# Patient Record
Sex: MALE | Race: WHITE | Employment: OTHER | URBAN - NONMETROPOLITAN AREA
[De-identification: names, ages, dates, MRNs, and addresses within clinical notes are randomized per-mention and may not be internally consistent; named-entity substitution may affect disease eponyms.]

---

## 2017-08-18 LAB
CREATININE, URINE: 81
MICROALBUMIN/CREAT 24H UR: 0.6 MG/G{CREAT}
MICROALBUMIN/CREAT UR-RTO: 7.4

## 2018-09-24 LAB
AVERAGE GLUCOSE: NORMAL
CHOLESTEROL, TOTAL: 178 MG/DL
CHOLESTEROL/HDL RATIO: 4.7
CREATININE: 1.4 MG/DL
HBA1C MFR BLD: 6.9 %
HDLC SERPL-MCNC: 38 MG/DL (ref 35–70)
LDL CHOLESTEROL CALCULATED: 111 MG/DL (ref 0–160)
POTASSIUM (K+): 4.8
TRIGL SERPL-MCNC: 169 MG/DL
VLDLC SERPL CALC-MCNC: NORMAL MG/DL

## 2019-03-26 VITALS
BODY MASS INDEX: 24.48 KG/M2 | DIASTOLIC BLOOD PRESSURE: 82 MMHG | WEIGHT: 171 LBS | TEMPERATURE: 96 F | HEIGHT: 70 IN | HEART RATE: 72 BPM | SYSTOLIC BLOOD PRESSURE: 140 MMHG

## 2019-03-26 RX ORDER — HYDROCODONE BITARTRATE AND ACETAMINOPHEN 7.5; 325 MG/1; MG/1
1 TABLET ORAL EVERY 6 HOURS PRN
COMMUNITY
End: 2019-06-21

## 2019-03-26 RX ORDER — ATENOLOL 25 MG/1
25 TABLET ORAL DAILY
COMMUNITY
End: 2019-06-21 | Stop reason: SDUPTHER

## 2019-03-26 RX ORDER — AMLODIPINE BESYLATE 5 MG/1
5 TABLET ORAL DAILY
COMMUNITY
End: 2019-06-21

## 2019-03-26 RX ORDER — GLYBURIDE 5 MG/1
5 TABLET ORAL
COMMUNITY
End: 2019-06-21

## 2019-03-26 RX ORDER — SIMVASTATIN 20 MG
20 TABLET ORAL NIGHTLY
COMMUNITY
End: 2019-06-21 | Stop reason: SDUPTHER

## 2019-03-26 RX ORDER — GEMFIBROZIL 600 MG/1
600 TABLET, FILM COATED ORAL
COMMUNITY
End: 2019-06-21 | Stop reason: SDUPTHER

## 2019-03-26 RX ORDER — MAGNESIUM GLUCONATE 27 MG(500)
500 TABLET ORAL DAILY
COMMUNITY

## 2019-06-20 PROBLEM — E78.5 HYPERLIPIDEMIA: Status: ACTIVE | Noted: 2019-06-20

## 2019-06-20 PROBLEM — E11.9 TYPE 2 DIABETES MELLITUS WITHOUT COMPLICATION (HCC): Status: ACTIVE | Noted: 2019-06-20

## 2019-06-20 PROBLEM — I10 HYPERTENSION: Status: ACTIVE | Noted: 2019-06-20

## 2019-06-20 PROBLEM — M19.90 OSTEOARTHRITIS: Status: ACTIVE | Noted: 2019-06-20

## 2019-06-20 NOTE — PROGRESS NOTES
19  Gunjan Plan : 1940 Sex: male  Age: 66 y.o. Chief Complaint   Patient presents with    Hypertension    Diabetes       Patient continues to have right knee discomfort. This is not to the point where it becomes somewhat limiting. His wife is trying to convince him to have the surgery. He is really conflicted about doing this. He did see Dr. Braden Lu regarding this. Patient denies excessive thirst, urination, hunger. He does have some BPH symptomatology and he does see urology for this. He is on alpha-blocker. Blood pressure is well controlled. Hemoglobin A1c will be obtained today. Patient did have lipid profile last fall and this will be rechecked again in the fall. PSAs have been followed by urology. Review of Systems  Health Maintenance:  Influenza Vaccination - (2018)  Colonoscopy Screening - (2014)  Colonoscopy - (2009)  Colonoscopy - (2014)  Colonoscopy -  [place],  [place] diverticular dx next -SEE REPORT  Prostate Exam - () Ned Huynhogrshama  Psa Test - ()  Physical Exam - (2017)  Rectal Exam - (2014)  Zoster Vaccine - ()  Pneumonia Vaccination - ()  Eye Exam - () VANCE- ADVISED REPEAT VISIT 17  Prevnar - (3/18/2015)  Urine Microalbumin - (2017)  Dilated Eye Exam - (2017) ADVISED  Diabetic Foot Exam - (2017)  1. Noninsulin requiring diabetes. 2. Hyperlipidemia. 3. Hypertension. 4. Osteoarthritis. STALLWORTH  5. CATARACT- VANCE DID SURGERY   6. KIDNEY INSUFFICIENCY   7. KIDNEY STONES--  8. DELONTE- ?? [facility]   9. CAROTID BRUIT-US ORDERED 19  10. LEFT GREATER TROCHANTERIC BUSITIS 19  11. PVD BY CT   Surgical Hx:  Cataract Removal -  VANCE  Transurethral Resection Of Prostate (TURP) - JACKI  Reviewed and updated. FH:  Father:  . (Hx)  Mother:  . (Hx)  Reviewed, no changes. SH: Patient is . Personal Habits: Former cigarette smoker, smoked 1 pack daily. strain: Not on file    Food insecurity:     Worry: Not on file     Inability: Not on file    Transportation needs:     Medical: Not on file     Non-medical: Not on file   Tobacco Use    Smoking status: Former Smoker    Smokeless tobacco: Never Used   Substance and Sexual Activity    Alcohol use: Not on file    Drug use: Not on file    Sexual activity: Not on file   Lifestyle    Physical activity:     Days per week: Not on file     Minutes per session: Not on file    Stress: Not on file   Relationships    Social connections:     Talks on phone: Not on file     Gets together: Not on file     Attends Taoism service: Not on file     Active member of club or organization: Not on file     Attends meetings of clubs or organizations: Not on file     Relationship status: Not on file    Intimate partner violence:     Fear of current or ex partner: Not on file     Emotionally abused: Not on file     Physically abused: Not on file     Forced sexual activity: Not on file   Other Topics Concern    Not on file   Social History Narrative    Not on file       Vitals:    19 0752   BP: 132/80   Pulse: 79   SpO2: 95%   Weight: 176 lb (79.8 kg)   Height: 5' 9\" (1.753 m)       Physical Exam  Objective  BP: 140/82 Pulse: 72 O2SatR: 96 Ht: 70\" 5'10\" Ht cm: 177.8 Wt: 171lb Wt Prior: 171lb as of 18  Wt Dif: 0lb Wt k.566 Wt kg Prior: 77.566 as of 18 Wt kg Dif: 0.000 BMI: 24.5 BSA: 1.95  Exam:  Const: Appears healthy, well developed and well nourished. Appears to weigh within normal range. Eyes: EOMI in both eyes. PERRL. ENMT: External ears WNL. Tympanic membranes are intact. External nose WNL. Moistness and  normal color of the nasal mucosae. Septum is in the midline. Dentition is in good repair. Gums  appear healthy. Posterior pharynx shows no exudate, irritation or redness. Neck: Supple and symmetric. Palpation reveals no adenopathy. No masses appreciated.  Thyroid:  no nodule appreciated or (ZOCOR) 20 MG tablet; Take 1 tablet by mouth nightly  -     gemfibrozil (LOPID) 600 MG tablet; Take 1 tablet by mouth 2 times daily (before meals)        Return in about 4 months (around 10/21/2019).       Seen By:  Amber Ruby MD

## 2019-06-21 ENCOUNTER — OFFICE VISIT (OUTPATIENT)
Dept: FAMILY MEDICINE CLINIC | Age: 79
End: 2019-06-21
Payer: COMMERCIAL

## 2019-06-21 VITALS
DIASTOLIC BLOOD PRESSURE: 80 MMHG | HEIGHT: 69 IN | HEART RATE: 79 BPM | BODY MASS INDEX: 26.07 KG/M2 | OXYGEN SATURATION: 95 % | WEIGHT: 176 LBS | SYSTOLIC BLOOD PRESSURE: 132 MMHG

## 2019-06-21 DIAGNOSIS — I10 ESSENTIAL HYPERTENSION: ICD-10-CM

## 2019-06-21 DIAGNOSIS — E78.2 MIXED HYPERLIPIDEMIA: ICD-10-CM

## 2019-06-21 DIAGNOSIS — E11.9 TYPE 2 DIABETES MELLITUS WITHOUT COMPLICATION, WITHOUT LONG-TERM CURRENT USE OF INSULIN (HCC): ICD-10-CM

## 2019-06-21 DIAGNOSIS — M17.0 PRIMARY OSTEOARTHRITIS OF BOTH KNEES: ICD-10-CM

## 2019-06-21 LAB
CREATININE URINE POCT: 50
HBA1C MFR BLD: 6.3 %
MICROALBUMIN/CREAT 24H UR: 30 MG/G{CREAT}
MICROALBUMIN/CREAT UR-RTO: NORMAL

## 2019-06-21 PROCEDURE — 82044 UR ALBUMIN SEMIQUANTITATIVE: CPT | Performed by: INTERNAL MEDICINE

## 2019-06-21 PROCEDURE — 99214 OFFICE O/P EST MOD 30 MIN: CPT | Performed by: INTERNAL MEDICINE

## 2019-06-21 PROCEDURE — 83036 HEMOGLOBIN GLYCOSYLATED A1C: CPT | Performed by: INTERNAL MEDICINE

## 2019-06-21 RX ORDER — SIMVASTATIN 20 MG
20 TABLET ORAL NIGHTLY
Qty: 90 TABLET | Refills: 1 | Status: SHIPPED | OUTPATIENT
Start: 2019-06-21 | End: 2019-12-23

## 2019-06-21 RX ORDER — AMLODIPINE BESYLATE AND BENAZEPRIL HYDROCHLORIDE 5; 40 MG/1; MG/1
1 CAPSULE ORAL DAILY
COMMUNITY
End: 2019-06-21 | Stop reason: SDUPTHER

## 2019-06-21 RX ORDER — TAMSULOSIN HYDROCHLORIDE 0.4 MG/1
0.4 CAPSULE ORAL DAILY
COMMUNITY
End: 2020-01-02 | Stop reason: SDUPTHER

## 2019-06-21 RX ORDER — GEMFIBROZIL 600 MG/1
600 TABLET, FILM COATED ORAL
Qty: 180 TABLET | Refills: 1 | Status: SHIPPED | OUTPATIENT
Start: 2019-06-21 | End: 2019-12-23

## 2019-06-21 RX ORDER — AMLODIPINE BESYLATE AND BENAZEPRIL HYDROCHLORIDE 5; 40 MG/1; MG/1
1 CAPSULE ORAL DAILY
Qty: 90 CAPSULE | Refills: 1 | Status: SHIPPED | OUTPATIENT
Start: 2019-06-21 | End: 2019-10-16 | Stop reason: CLARIF

## 2019-06-21 RX ORDER — ATENOLOL 25 MG/1
25 TABLET ORAL DAILY
Qty: 90 TABLET | Refills: 1 | Status: SHIPPED | OUTPATIENT
Start: 2019-06-21 | End: 2019-12-23

## 2019-10-08 RX ORDER — HYDROCODONE BITARTRATE AND ACETAMINOPHEN 7.5; 325 MG/1; MG/1
1 TABLET ORAL EVERY 6 HOURS PRN
COMMUNITY
End: 2019-10-16 | Stop reason: CLARIF

## 2019-10-16 ENCOUNTER — HOSPITAL ENCOUNTER (OUTPATIENT)
Age: 79
Discharge: HOME | End: 2019-10-18
Payer: COMMERCIAL

## 2019-10-16 ENCOUNTER — OFFICE VISIT (OUTPATIENT)
Dept: FAMILY MEDICINE CLINIC | Age: 79
End: 2019-10-16
Payer: COMMERCIAL

## 2019-10-16 VITALS
SYSTOLIC BLOOD PRESSURE: 134 MMHG | DIASTOLIC BLOOD PRESSURE: 70 MMHG | HEART RATE: 64 BPM | BODY MASS INDEX: 25.4 KG/M2 | WEIGHT: 172 LBS | OXYGEN SATURATION: 94 %

## 2019-10-16 DIAGNOSIS — E11.9 TYPE 2 DIABETES MELLITUS WITHOUT COMPLICATION, WITHOUT LONG-TERM CURRENT USE OF INSULIN (HCC): Primary | ICD-10-CM

## 2019-10-16 DIAGNOSIS — M17.0 PRIMARY OSTEOARTHRITIS OF BOTH KNEES: ICD-10-CM

## 2019-10-16 DIAGNOSIS — L98.9 SKIN LESION: ICD-10-CM

## 2019-10-16 DIAGNOSIS — Z23 INFLUENZA VACCINE ADMINISTERED: ICD-10-CM

## 2019-10-16 DIAGNOSIS — E78.2 MIXED HYPERLIPIDEMIA: ICD-10-CM

## 2019-10-16 DIAGNOSIS — I10 ESSENTIAL HYPERTENSION: ICD-10-CM

## 2019-10-16 DIAGNOSIS — E11.9 TYPE 2 DIABETES MELLITUS WITHOUT COMPLICATION, WITHOUT LONG-TERM CURRENT USE OF INSULIN (HCC): ICD-10-CM

## 2019-10-16 LAB
ALBUMIN SERPL-MCNC: 4.8 G/DL (ref 3.5–5.2)
ALP BLD-CCNC: 72 U/L (ref 40–129)
ALT SERPL-CCNC: 12 U/L (ref 0–40)
ANION GAP SERPL CALCULATED.3IONS-SCNC: 14 MMOL/L (ref 7–16)
AST SERPL-CCNC: 13 U/L (ref 0–39)
BASOPHILS ABSOLUTE: 0.03 E9/L (ref 0–0.2)
BASOPHILS RELATIVE PERCENT: 0.5 % (ref 0–2)
BILIRUB SERPL-MCNC: 0.3 MG/DL (ref 0–1.2)
BUN BLDV-MCNC: 21 MG/DL (ref 8–23)
CALCIUM SERPL-MCNC: 9.4 MG/DL (ref 8.6–10.2)
CHLORIDE BLD-SCNC: 104 MMOL/L (ref 98–107)
CHOLESTEROL, TOTAL: 178 MG/DL (ref 0–199)
CO2: 22 MMOL/L (ref 22–29)
CREAT SERPL-MCNC: 1.2 MG/DL (ref 0.7–1.2)
EOSINOPHILS ABSOLUTE: 0.8 E9/L (ref 0.05–0.5)
EOSINOPHILS RELATIVE PERCENT: 12.3 % (ref 0–6)
GFR AFRICAN AMERICAN: >60
GFR NON-AFRICAN AMERICAN: 58 ML/MIN/1.73
GLUCOSE BLD-MCNC: 127 MG/DL (ref 74–99)
HBA1C MFR BLD: 6.7 %
HCT VFR BLD CALC: 41.5 % (ref 37–54)
HDLC SERPL-MCNC: 39 MG/DL
HEMOGLOBIN: 13.2 G/DL (ref 12.5–16.5)
IMMATURE GRANULOCYTES #: 0.03 E9/L
IMMATURE GRANULOCYTES %: 0.5 % (ref 0–5)
LDL CHOLESTEROL CALCULATED: 113 MG/DL (ref 0–99)
LYMPHOCYTES ABSOLUTE: 2.56 E9/L (ref 1.5–4)
LYMPHOCYTES RELATIVE PERCENT: 39.4 % (ref 20–42)
MCH RBC QN AUTO: 27.7 PG (ref 26–35)
MCHC RBC AUTO-ENTMCNC: 31.8 % (ref 32–34.5)
MCV RBC AUTO: 87 FL (ref 80–99.9)
MONOCYTES ABSOLUTE: 0.42 E9/L (ref 0.1–0.95)
MONOCYTES RELATIVE PERCENT: 6.5 % (ref 2–12)
NEUTROPHILS ABSOLUTE: 2.66 E9/L (ref 1.8–7.3)
NEUTROPHILS RELATIVE PERCENT: 40.8 % (ref 43–80)
PDW BLD-RTO: 13.4 FL (ref 11.5–15)
PLATELET # BLD: 305 E9/L (ref 130–450)
PMV BLD AUTO: 11.4 FL (ref 7–12)
POTASSIUM SERPL-SCNC: 4.6 MMOL/L (ref 3.5–5)
RBC # BLD: 4.77 E12/L (ref 3.8–5.8)
SODIUM BLD-SCNC: 140 MMOL/L (ref 132–146)
TOTAL PROTEIN: 8 G/DL (ref 6.4–8.3)
TRIGL SERPL-MCNC: 129 MG/DL (ref 0–149)
VLDLC SERPL CALC-MCNC: 26 MG/DL
WBC # BLD: 6.5 E9/L (ref 4.5–11.5)

## 2019-10-16 PROCEDURE — 90653 IIV ADJUVANT VACCINE IM: CPT | Performed by: INTERNAL MEDICINE

## 2019-10-16 PROCEDURE — 36415 COLL VENOUS BLD VENIPUNCTURE: CPT

## 2019-10-16 PROCEDURE — 80053 COMPREHEN METABOLIC PANEL: CPT

## 2019-10-16 PROCEDURE — 83036 HEMOGLOBIN GLYCOSYLATED A1C: CPT | Performed by: INTERNAL MEDICINE

## 2019-10-16 PROCEDURE — 85025 COMPLETE CBC W/AUTO DIFF WBC: CPT

## 2019-10-16 PROCEDURE — 99214 OFFICE O/P EST MOD 30 MIN: CPT | Performed by: INTERNAL MEDICINE

## 2019-10-16 PROCEDURE — G0008 ADMIN INFLUENZA VIRUS VAC: HCPCS | Performed by: INTERNAL MEDICINE

## 2019-10-16 PROCEDURE — 80061 LIPID PANEL: CPT

## 2019-10-16 RX ORDER — HYDROCODONE BITARTRATE AND ACETAMINOPHEN 7.5; 325 MG/1; MG/1
1 TABLET ORAL EVERY 6 HOURS PRN
Qty: 28 TABLET | Refills: 0 | Status: SHIPPED | OUTPATIENT
Start: 2019-10-16 | End: 2021-02-23 | Stop reason: SDUPTHER

## 2019-10-16 RX ORDER — AMLODIPINE BESYLATE 5 MG/1
5 TABLET ORAL DAILY
COMMUNITY
End: 2019-10-31 | Stop reason: SDUPTHER

## 2019-10-16 ASSESSMENT — PATIENT HEALTH QUESTIONNAIRE - PHQ9
SUM OF ALL RESPONSES TO PHQ9 QUESTIONS 1 & 2: 0
2. FEELING DOWN, DEPRESSED OR HOPELESS: 0
1. LITTLE INTEREST OR PLEASURE IN DOING THINGS: 0
SUM OF ALL RESPONSES TO PHQ QUESTIONS 1-9: 0
SUM OF ALL RESPONSES TO PHQ QUESTIONS 1-9: 0

## 2019-10-31 RX ORDER — AMLODIPINE BESYLATE 5 MG/1
5 TABLET ORAL DAILY
Qty: 30 TABLET | Refills: 3 | Status: SHIPPED | OUTPATIENT
Start: 2019-10-31 | End: 2020-01-30 | Stop reason: SDUPTHER

## 2019-12-21 DIAGNOSIS — I10 ESSENTIAL HYPERTENSION: ICD-10-CM

## 2019-12-21 DIAGNOSIS — E78.2 MIXED HYPERLIPIDEMIA: ICD-10-CM

## 2019-12-23 RX ORDER — SIMVASTATIN 20 MG
20 TABLET ORAL NIGHTLY
Qty: 90 TABLET | Refills: 0 | Status: SHIPPED | OUTPATIENT
Start: 2019-12-23 | End: 2020-01-30 | Stop reason: SDUPTHER

## 2019-12-23 RX ORDER — ATENOLOL 25 MG/1
TABLET ORAL
Qty: 90 TABLET | Refills: 0 | Status: SHIPPED | OUTPATIENT
Start: 2019-12-23 | End: 2020-01-30 | Stop reason: SDUPTHER

## 2019-12-23 RX ORDER — GEMFIBROZIL 600 MG/1
TABLET, FILM COATED ORAL
Qty: 180 TABLET | Refills: 0 | Status: SHIPPED | OUTPATIENT
Start: 2019-12-23 | End: 2020-01-30 | Stop reason: SDUPTHER

## 2020-01-02 RX ORDER — TAMSULOSIN HYDROCHLORIDE 0.4 MG/1
0.4 CAPSULE ORAL DAILY
Qty: 90 CAPSULE | Refills: 0 | Status: SHIPPED
Start: 2020-01-02 | End: 2020-02-12 | Stop reason: SDUPTHER

## 2020-01-30 RX ORDER — GEMFIBROZIL 600 MG/1
TABLET, FILM COATED ORAL
Qty: 180 TABLET | Refills: 0 | Status: SHIPPED
Start: 2020-01-30 | End: 2020-02-12 | Stop reason: SDUPTHER

## 2020-01-30 RX ORDER — SIMVASTATIN 20 MG
20 TABLET ORAL NIGHTLY
Qty: 90 TABLET | Refills: 0 | Status: SHIPPED
Start: 2020-01-30 | End: 2020-02-12 | Stop reason: SDUPTHER

## 2020-01-30 RX ORDER — ATENOLOL 25 MG/1
TABLET ORAL
Qty: 90 TABLET | Refills: 0 | Status: SHIPPED
Start: 2020-01-30 | End: 2020-02-12 | Stop reason: SDUPTHER

## 2020-01-30 RX ORDER — AMLODIPINE BESYLATE 5 MG/1
5 TABLET ORAL DAILY
Qty: 90 TABLET | Refills: 1 | Status: SHIPPED
Start: 2020-01-30 | End: 2020-02-12 | Stop reason: SDUPTHER

## 2020-01-30 NOTE — TELEPHONE ENCOUNTER
Wife calling in stating patient is out of medications completely, called pharmacy stated they never received the refill requests from 12/23/2019,medication pended.

## 2020-02-12 ENCOUNTER — HOSPITAL ENCOUNTER (OUTPATIENT)
Age: 80
Discharge: HOME | End: 2020-02-14
Payer: MEDICARE

## 2020-02-12 ENCOUNTER — OFFICE VISIT (OUTPATIENT)
Dept: FAMILY MEDICINE CLINIC | Age: 80
End: 2020-02-12
Payer: COMMERCIAL

## 2020-02-12 VITALS
DIASTOLIC BLOOD PRESSURE: 78 MMHG | BODY MASS INDEX: 25.55 KG/M2 | OXYGEN SATURATION: 96 % | SYSTOLIC BLOOD PRESSURE: 126 MMHG | HEART RATE: 92 BPM | WEIGHT: 173 LBS

## 2020-02-12 PROBLEM — R06.2 WHEEZE: Status: ACTIVE | Noted: 2020-02-12

## 2020-02-12 LAB
ALBUMIN SERPL-MCNC: 4.8 G/DL (ref 3.5–5.2)
ALP BLD-CCNC: 79 U/L (ref 40–129)
ALT SERPL-CCNC: 10 U/L (ref 0–40)
ANION GAP SERPL CALCULATED.3IONS-SCNC: 16 MMOL/L (ref 7–16)
AST SERPL-CCNC: 11 U/L (ref 0–39)
BILIRUB SERPL-MCNC: 0.3 MG/DL (ref 0–1.2)
BUN BLDV-MCNC: 24 MG/DL (ref 8–23)
CALCIUM SERPL-MCNC: 9.4 MG/DL (ref 8.6–10.2)
CHLORIDE BLD-SCNC: 104 MMOL/L (ref 98–107)
CO2: 19 MMOL/L (ref 22–29)
CREAT SERPL-MCNC: 1.2 MG/DL (ref 0.7–1.2)
GFR AFRICAN AMERICAN: >60
GFR NON-AFRICAN AMERICAN: 58 ML/MIN/1.73
GLUCOSE BLD-MCNC: 133 MG/DL (ref 74–99)
HBA1C MFR BLD: 6.6 %
POTASSIUM SERPL-SCNC: 4.8 MMOL/L (ref 3.5–5)
SODIUM BLD-SCNC: 139 MMOL/L (ref 132–146)
TOTAL PROTEIN: 7.9 G/DL (ref 6.4–8.3)

## 2020-02-12 PROCEDURE — 80053 COMPREHEN METABOLIC PANEL: CPT

## 2020-02-12 PROCEDURE — 83036 HEMOGLOBIN GLYCOSYLATED A1C: CPT | Performed by: INTERNAL MEDICINE

## 2020-02-12 PROCEDURE — 36415 COLL VENOUS BLD VENIPUNCTURE: CPT

## 2020-02-12 PROCEDURE — 99214 OFFICE O/P EST MOD 30 MIN: CPT | Performed by: INTERNAL MEDICINE

## 2020-02-12 RX ORDER — AMLODIPINE BESYLATE 5 MG/1
5 TABLET ORAL DAILY
Qty: 90 TABLET | Refills: 1 | Status: SHIPPED
Start: 2020-02-12 | End: 2020-06-17 | Stop reason: SDUPTHER

## 2020-02-12 RX ORDER — GEMFIBROZIL 600 MG/1
TABLET, FILM COATED ORAL
Qty: 180 TABLET | Refills: 1 | Status: SHIPPED
Start: 2020-02-12 | End: 2020-06-17 | Stop reason: SDUPTHER

## 2020-02-12 RX ORDER — ATENOLOL 25 MG/1
TABLET ORAL
Qty: 90 TABLET | Refills: 1 | Status: SHIPPED
Start: 2020-02-12 | End: 2020-06-17 | Stop reason: SDUPTHER

## 2020-02-12 RX ORDER — SIMVASTATIN 20 MG
20 TABLET ORAL NIGHTLY
Qty: 90 TABLET | Refills: 1 | Status: SHIPPED
Start: 2020-02-12 | End: 2020-06-17 | Stop reason: SDUPTHER

## 2020-02-12 RX ORDER — TAMSULOSIN HYDROCHLORIDE 0.4 MG/1
0.4 CAPSULE ORAL DAILY
Qty: 90 CAPSULE | Refills: 1 | Status: SHIPPED
Start: 2020-02-12 | End: 2020-06-17 | Stop reason: SDUPTHER

## 2020-02-12 RX ORDER — ALBUTEROL SULFATE 90 UG/1
2 AEROSOL, METERED RESPIRATORY (INHALATION) 4 TIMES DAILY PRN
Qty: 1 INHALER | Refills: 0 | Status: SHIPPED
Start: 2020-02-12 | End: 2020-06-17

## 2020-02-12 ASSESSMENT — PATIENT HEALTH QUESTIONNAIRE - PHQ9
1. LITTLE INTEREST OR PLEASURE IN DOING THINGS: 0
2. FEELING DOWN, DEPRESSED OR HOPELESS: 0
SUM OF ALL RESPONSES TO PHQ QUESTIONS 1-9: 0
SUM OF ALL RESPONSES TO PHQ9 QUESTIONS 1 & 2: 0
SUM OF ALL RESPONSES TO PHQ QUESTIONS 1-9: 0

## 2020-02-12 NOTE — PROGRESS NOTES
19  Jaime Sood : 1940 Sex: male  Age: 78 y.o. Chief Complaint   Patient presents with    Medication Refill       Patient is developed a cold with some bronchitic symptoms along with some wheezing. This been going on for just a short period of time. The patient does have a cough productive of clear sputum. The patient denies fever, chills, sweats. There is been no night sweats. Recent prostate examination by Dr. Jorge Feliz. Recent PSA level. Patient is reminded once again to get a dilated eye examination. Influenza vaccine was given in the fall. Hyperlipidemia     Hypertension         Review of Systems  Health Maintenance:  Influenza Vaccination - ()  Colonoscopy Screening - (2014)  Colonoscopy - (2009)  Colonoscopy - (2014)  Colonoscopy -  [place],  [place] diverticular dx next , -SEE REPORT  Prostate Exam - () SCOLERI  Psa Test - ()  Physical Exam -19  Rectal Exam - (2014)  Zoster Vaccine - ()  Pneumonia Vaccination - ()  Eye Exam - () VANCE- ADVISED REPEAT VISIT 17  Prevnar - (3/18/2015)  Urine Microalbumin - (19)  Dilated Eye Exam - (2020) ADVISED  Diabetic Foot Exam - (19)  1. Noninsulin requiring diabetes. 2. Hyperlipidemia. 3. Hypertension. 4. Osteoarthritis. STALLWORTH  5. CATARACT- VANCE DID SURGERY   6. KIDNEY INSUFFICIENCY   7. KIDNEY STON[facility]--  [facility]   9. CAROTID BRUIT-US ORDERED 19  10. LEFT GREATER TROCHANTERIC BUSITIS 19  11. PVD BY CT   Surgical Hx:  Cataract Removal -  VANCE  Transurethral Resection Of Prostate (TURP) - JACKI  Reviewed and updated. FH:  Father:  . (Hx)  Mother:  . (Hx)  Reviewed, no changes. SH: Patient is . Personal Habits: Former cigarette smoker, smoked 1 pack daily. Drinks alcohol occasionally. Does  housework daily. Reviewed, no changes. Date: 2019  Was the patient queried about smoking behavior? complication, without long-term current use of insulin (HCC)  -     metFORMIN (GLUCOPHAGE) 1000 MG tablet; Take 1 tablet by mouth 2 times daily (with meals)  -     POCT glycosylated hemoglobin (Hb A1C)  -     Comprehensive Metabolic Panel; Future    Wheeze    Other orders  -     amLODIPine (NORVASC) 5 MG tablet; Take 1 tablet by mouth daily  -     tamsulosin (FLOMAX) 0.4 MG capsule; Take 1 capsule by mouth daily  -     albuterol sulfate  (90 Base) MCG/ACT inhaler; Inhale 2 puffs into the lungs 4 times daily as needed for Wheezing    Patient is given Ventolin for his wheeze. He is to use a mucolytic such as Robitussin or Mucinex. Hemoglobin A1c is well controlled. Reminded once again to get a dilated eye examination. Other screenings are up-to-date. The patient continues to exercise his left and right knee.

## 2020-06-17 ENCOUNTER — OFFICE VISIT (OUTPATIENT)
Dept: FAMILY MEDICINE CLINIC | Age: 80
End: 2020-06-17
Payer: MEDICARE

## 2020-06-17 VITALS
TEMPERATURE: 97.9 F | HEART RATE: 63 BPM | HEIGHT: 69 IN | BODY MASS INDEX: 25.48 KG/M2 | OXYGEN SATURATION: 96 % | WEIGHT: 172 LBS | SYSTOLIC BLOOD PRESSURE: 138 MMHG | DIASTOLIC BLOOD PRESSURE: 60 MMHG

## 2020-06-17 LAB — HBA1C MFR BLD: 7.2 %

## 2020-06-17 PROCEDURE — 83036 HEMOGLOBIN GLYCOSYLATED A1C: CPT | Performed by: INTERNAL MEDICINE

## 2020-06-17 PROCEDURE — 99214 OFFICE O/P EST MOD 30 MIN: CPT | Performed by: INTERNAL MEDICINE

## 2020-06-17 RX ORDER — TAMSULOSIN HYDROCHLORIDE 0.4 MG/1
0.4 CAPSULE ORAL DAILY
Qty: 90 CAPSULE | Refills: 1 | Status: SHIPPED
Start: 2020-06-17 | End: 2020-10-20 | Stop reason: SDUPTHER

## 2020-06-17 RX ORDER — SIMVASTATIN 20 MG
20 TABLET ORAL NIGHTLY
Qty: 90 TABLET | Refills: 1 | Status: SHIPPED
Start: 2020-06-17 | End: 2020-10-20 | Stop reason: SDUPTHER

## 2020-06-17 RX ORDER — ATENOLOL 25 MG/1
TABLET ORAL
Qty: 90 TABLET | Refills: 1 | Status: SHIPPED
Start: 2020-06-17 | End: 2020-10-20 | Stop reason: SDUPTHER

## 2020-06-17 RX ORDER — AMLODIPINE BESYLATE 5 MG/1
5 TABLET ORAL DAILY
Qty: 90 TABLET | Refills: 1 | Status: SHIPPED
Start: 2020-06-17 | End: 2020-10-20 | Stop reason: SDUPTHER

## 2020-06-17 RX ORDER — HYDROCODONE BITARTRATE AND ACETAMINOPHEN 10; 325 MG/1; MG/1
1 TABLET ORAL EVERY 8 HOURS PRN
Qty: 90 TABLET | Refills: 0 | Status: SHIPPED | OUTPATIENT
Start: 2020-06-17 | End: 2020-07-17

## 2020-06-17 RX ORDER — GEMFIBROZIL 600 MG/1
TABLET, FILM COATED ORAL
Qty: 180 TABLET | Refills: 1 | Status: SHIPPED
Start: 2020-06-17 | End: 2020-10-20 | Stop reason: SDUPTHER

## 2020-06-17 NOTE — PROGRESS NOTES
19  Sami Fallon : 1940 Sex: male  Age: 78 y.o. Chief Complaint   Patient presents with    Hypertension     6 mo f/u       Patient's chief complaint today is worsening arthritic symptoms of right greater than left knee. He and I discussed this for quite some time he would like referral for knee replacement. He did go Dr. Ke Morris once but he felt the hospital charge was so excessive that he does not wish to return there. We did discuss other alternatives. Patient is denying excessive thirst, urination, hunger. Rarely takes blood sugars at home. Rarely takes opiate medication for his pain. His last prescription was several years ago. Patient denies GI or  symptomatology. He has had no gross hematuria. He has been evaluated by Dr. Evan Barker for hematuria in the past.    Hyperlipidemia     Hypertension         Review of Systems  Health Maintenance:  Influenza Vaccination - ()  Colonoscopy Screening - (2014)  Colonoscopy - (2009)  Colonoscopy - (2014)  Colonoscopy -  [place],  [place] diverticular dx next -SEE REPORT  Prostate Exam - () SCOLERI  Psa Test - ()  Physical Exam -  Rectal Exam - (2014)  Zoster Vaccine - ()  Pneumonia Vaccination - ()  Eye Exam - () VANCE- ADVISED REPEAT VISIT 17  Prevnar - (3/18/2015)  Urine Microalbumin - (19)  Dilated Eye Exam - (2020) ADVISED  Diabetic Foot Exam - (19)  1. Noninsulin requiring diabetes. 2. Hyperlipidemia. 3. Hypertension. 4. Osteoarthritis. SHANNON Arriaza 2020  5. CATARACT- VANCE DID SURGERY   6. KIDNEY INSUFFICIENCY   7. KIDNEY STONES--  [facility]. [facility]   9. CAROTID BRUIT-US ORDERED 19  10. LEFT GREATER TROCHANTERIC BUSITIS 19  11. PVD BY CT   Surgical Hx:  Cataract Removal -  VANCE  Transurethral Resection Of Prostate (TURP) - JACKI  Reviewed and updated. FH:  Father:  . (Hx)  Mother:  .  (Hx)  Reviewed, Orthopedic Surgery  -     HYDROcodone-acetaminophen (NORCO)  MG per tablet; Take 1 tablet by mouth every 8 hours as needed for Pain for up to 30 days. Intended supply: 30 days    Mixed hyperlipidemia  -     simvastatin (ZOCOR) 20 MG tablet; Take 1 tablet by mouth nightly  -     gemfibrozil (LOPID) 600 MG tablet; TAKE ONE TABLET BY MOUTH TWO TIMES A DAY BEFORE MEALS    Type 2 diabetes mellitus without complication, without long-term current use of insulin (HCC)  -     metFORMIN (GLUCOPHAGE) 1000 MG tablet; Take 1 tablet by mouth 2 times daily (with meals)  -     POCT glycosylated hemoglobin (Hb A1C)    Essential hypertension  -     atenolol (TENORMIN) 25 MG tablet; TAKE ONE TABLET BY MOUTH DAILY  -     Comprehensive Metabolic Panel; Future    Other orders  -     tamsulosin (FLOMAX) 0.4 MG capsule; Take 1 capsule by mouth daily  -     amLODIPine (NORVASC) 5 MG tablet; Take 1 tablet by mouth daily    Hemoglobin A1c was 7.2 today which is up from previous. Dietary restriction is advised. The patient is blood pressure is well controlled. He will be referred to Dr. Otf Regan for evaluation of his knee arthritis. I did not elect to give him a steroid injection in the knee today because I expect surgery within the next 3 to 4 months. Patient is advised to do flexion and extension exercises of the knee prior to his appointment with Dr. Otf Regan.

## 2020-06-18 ENCOUNTER — TELEPHONE (OUTPATIENT)
Dept: FAMILY MEDICINE CLINIC | Age: 80
End: 2020-06-18

## 2020-06-22 ENCOUNTER — HOSPITAL ENCOUNTER (OUTPATIENT)
Age: 80
Discharge: HOME | End: 2020-06-24
Payer: MEDICARE

## 2020-06-22 LAB
ALBUMIN SERPL-MCNC: 5.2 G/DL (ref 3.5–5.2)
ALP BLD-CCNC: 68 U/L (ref 40–129)
ALT SERPL-CCNC: 13 U/L (ref 0–40)
ANION GAP SERPL CALCULATED.3IONS-SCNC: 15 MMOL/L (ref 7–16)
AST SERPL-CCNC: 15 U/L (ref 0–39)
BILIRUB SERPL-MCNC: 0.3 MG/DL (ref 0–1.2)
BUN BLDV-MCNC: 23 MG/DL (ref 8–23)
CALCIUM SERPL-MCNC: 9.5 MG/DL (ref 8.6–10.2)
CHLORIDE BLD-SCNC: 101 MMOL/L (ref 98–107)
CO2: 23 MMOL/L (ref 22–29)
CREAT SERPL-MCNC: 1.3 MG/DL (ref 0.7–1.2)
GFR AFRICAN AMERICAN: >60
GFR NON-AFRICAN AMERICAN: 53 ML/MIN/1.73
GLUCOSE BLD-MCNC: 161 MG/DL (ref 74–99)
POTASSIUM SERPL-SCNC: 4.6 MMOL/L (ref 3.5–5)
SODIUM BLD-SCNC: 139 MMOL/L (ref 132–146)
TOTAL PROTEIN: 7.9 G/DL (ref 6.4–8.3)

## 2020-06-22 PROCEDURE — 36415 COLL VENOUS BLD VENIPUNCTURE: CPT

## 2020-06-22 PROCEDURE — 80053 COMPREHEN METABOLIC PANEL: CPT

## 2020-06-25 ENCOUNTER — TELEPHONE (OUTPATIENT)
Dept: PHARMACY | Facility: CLINIC | Age: 80
End: 2020-06-25

## 2020-09-02 ENCOUNTER — TELEPHONE (OUTPATIENT)
Dept: ADMINISTRATIVE | Age: 80
End: 2020-09-02

## 2020-09-08 ENCOUNTER — HOSPITAL ENCOUNTER (OUTPATIENT)
Age: 80
Discharge: HOME | End: 2020-09-10
Payer: MEDICARE

## 2020-09-08 ENCOUNTER — OFFICE VISIT (OUTPATIENT)
Dept: FAMILY MEDICINE CLINIC | Age: 80
End: 2020-09-08
Payer: MEDICARE

## 2020-09-08 VITALS
BODY MASS INDEX: 25.03 KG/M2 | HEART RATE: 70 BPM | WEIGHT: 169 LBS | DIASTOLIC BLOOD PRESSURE: 72 MMHG | SYSTOLIC BLOOD PRESSURE: 130 MMHG | HEIGHT: 69 IN | OXYGEN SATURATION: 96 %

## 2020-09-08 LAB
ANION GAP SERPL CALCULATED.3IONS-SCNC: 19 MMOL/L (ref 7–16)
BASOPHILS ABSOLUTE: 0.03 E9/L (ref 0–0.2)
BASOPHILS RELATIVE PERCENT: 0.5 % (ref 0–2)
BILIRUBIN URINE: NEGATIVE
BLOOD, URINE: NEGATIVE
BUN BLDV-MCNC: 23 MG/DL (ref 8–23)
CALCIUM SERPL-MCNC: 9.5 MG/DL (ref 8.6–10.2)
CHLORIDE BLD-SCNC: 103 MMOL/L (ref 98–107)
CLARITY: CLEAR
CO2: 18 MMOL/L (ref 22–29)
COLOR: YELLOW
CREAT SERPL-MCNC: 1.2 MG/DL (ref 0.7–1.2)
EOSINOPHILS ABSOLUTE: 0.25 E9/L (ref 0.05–0.5)
EOSINOPHILS RELATIVE PERCENT: 4.4 % (ref 0–6)
GFR AFRICAN AMERICAN: >60
GFR NON-AFRICAN AMERICAN: 58 ML/MIN/1.73
GLUCOSE BLD-MCNC: 169 MG/DL (ref 74–99)
GLUCOSE URINE: NEGATIVE MG/DL
HBA1C MFR BLD: 6.7 %
HCT VFR BLD CALC: 39.6 % (ref 37–54)
HEMOGLOBIN: 12.6 G/DL (ref 12.5–16.5)
IMMATURE GRANULOCYTES #: 0.05 E9/L
IMMATURE GRANULOCYTES %: 0.9 % (ref 0–5)
KETONES, URINE: NEGATIVE MG/DL
LEUKOCYTE ESTERASE, URINE: NEGATIVE
LYMPHOCYTES ABSOLUTE: 1.88 E9/L (ref 1.5–4)
LYMPHOCYTES RELATIVE PERCENT: 33.5 % (ref 20–42)
MCH RBC QN AUTO: 28 PG (ref 26–35)
MCHC RBC AUTO-ENTMCNC: 31.8 % (ref 32–34.5)
MCV RBC AUTO: 88 FL (ref 80–99.9)
MONOCYTES ABSOLUTE: 0.42 E9/L (ref 0.1–0.95)
MONOCYTES RELATIVE PERCENT: 7.5 % (ref 2–12)
NEUTROPHILS ABSOLUTE: 2.99 E9/L (ref 1.8–7.3)
NEUTROPHILS RELATIVE PERCENT: 53.2 % (ref 43–80)
NITRITE, URINE: NEGATIVE
PDW BLD-RTO: 13.5 FL (ref 11.5–15)
PH UA: 6 (ref 5–9)
PLATELET # BLD: 303 E9/L (ref 130–450)
PMV BLD AUTO: 11.6 FL (ref 7–12)
POTASSIUM SERPL-SCNC: 4.9 MMOL/L (ref 3.5–5)
PROTEIN UA: NEGATIVE MG/DL
RBC # BLD: 4.5 E12/L (ref 3.8–5.8)
SODIUM BLD-SCNC: 140 MMOL/L (ref 132–146)
SPECIFIC GRAVITY UA: 1.01 (ref 1–1.03)
UROBILINOGEN, URINE: 0.2 E.U./DL
WBC # BLD: 5.6 E9/L (ref 4.5–11.5)

## 2020-09-08 PROCEDURE — 99214 OFFICE O/P EST MOD 30 MIN: CPT | Performed by: INTERNAL MEDICINE

## 2020-09-08 PROCEDURE — 81003 URINALYSIS AUTO W/O SCOPE: CPT | Performed by: INTERNAL MEDICINE

## 2020-09-08 PROCEDURE — 3051F HG A1C>EQUAL 7.0%<8.0%: CPT | Performed by: INTERNAL MEDICINE

## 2020-09-08 PROCEDURE — 81003 URINALYSIS AUTO W/O SCOPE: CPT

## 2020-09-08 PROCEDURE — 85025 COMPLETE CBC W/AUTO DIFF WBC: CPT

## 2020-09-08 PROCEDURE — 80048 BASIC METABOLIC PNL TOTAL CA: CPT

## 2020-09-08 PROCEDURE — 93000 ELECTROCARDIOGRAM COMPLETE: CPT | Performed by: INTERNAL MEDICINE

## 2020-09-08 PROCEDURE — 36415 COLL VENOUS BLD VENIPUNCTURE: CPT

## 2020-09-08 NOTE — PROGRESS NOTES
19  Massiel Gates : 1940 Sex: male  Age: 78 y.o. Chief Complaint   Patient presents with    Pre-op Exam       This is a patient who is scheduled for right total knee arthroplasty with Dr. Francisco Liriano on 2020. This will be done at [facility].  Patient does perform activities on a regular basis at exceed 4 metabolic equivalents. With this activity the patient is denying cardiac or respiratory symptoms including any anginal equivalents. Previous surgeries the patient did have some difficulty postoperatively with urinary retention. He is on Flomax. If the catheter can be avoided, this would be suggested. Patient had no previous problems with anesthesia including postop nausea vomiting etc.  His hemoglobin A1c today is 6.7. I have asked him to stop his metformin a day in advance of the surgery. He is asked to take his blood pressure medications with sips of water the day of surgery. Hypertension     Hyperlipidemia         Review of Systems  Health Maintenance:  Influenza Vaccination - ()  Colonoscopy Screening - (2014)  Colonoscopy - (2009)  Colonoscopy - (2014)  Colonoscopy -  [place],  [place] diverticular dx next , -SEE REPORT  Prostate Exam - () SCOLERI  Psa Test - ()  Physical Exam -  Rectal Exam - (2014)  Zoster Vaccine - ()  Pneumonia Vaccination - ()  Eye Exam - () VANCE- ADVISED REPEAT VISIT 17  Prevnar - (3/18/2015)  Urine Microalbumin - (19)  Dilated Eye Exam - (2020) ADVISED  Diabetic Foot Exam - (19)  1. Noninsulin requiring diabetes. 2. Hyperlipidemia. 3. Hypertension. 4. Osteoarthritis. SHANNON Arriaza 2020  5. CATARACT- VANCE DID SURGERY   6. KIDNEY INSUFFICIENCY   7. KIDNEY STONES--  [facility]. [facility]   9. CAROTID BRUIT-US ORDERED 19  10. LEFT GREATER TROCHANTERIC BUSITIS 19  11.  PVD BY CT   Surgical Hx:  Cataract Removal - 7/11 VANCE  Transurethral Resection Of Prostate (TURP) Geremias ECHEVERRIA  Reviewed and updated. FH:  Father:  . (Hx)  Mother:  . (Hx)  Reviewed, no changes. SH: Patient is . Personal Habits: Former cigarette smoker, smoked 1 pack daily. Drinks alcohol occasionally. Does  housework daily. Reviewed, no changes. Date: 9/8/2020  Was the patient queried about smoking behavior? Yes   Does the patient currently smoke? Smoking: Patient is a former smoker. Was the patient counseled about smoking cessation?  Yes       Current Outpatient Medications:     tamsulosin (FLOMAX) 0.4 MG capsule, Take 1 capsule by mouth daily, Disp: 90 capsule, Rfl: 1    simvastatin (ZOCOR) 20 MG tablet, Take 1 tablet by mouth nightly, Disp: 90 tablet, Rfl: 1    metFORMIN (GLUCOPHAGE) 1000 MG tablet, Take 1 tablet by mouth 2 times daily (with meals), Disp: 180 tablet, Rfl: 1    gemfibrozil (LOPID) 600 MG tablet, TAKE ONE TABLET BY MOUTH TWO TIMES A DAY BEFORE MEALS, Disp: 180 tablet, Rfl: 1    atenolol (TENORMIN) 25 MG tablet, TAKE ONE TABLET BY MOUTH DAILY, Disp: 90 tablet, Rfl: 1    amLODIPine (NORVASC) 5 MG tablet, Take 1 tablet by mouth daily, Disp: 90 tablet, Rfl: 1    Multiple Vitamins-Minerals (CENTRUM SILVER PO), Take by mouth daily, Disp: , Rfl:     aspirin 81 MG tablet, Take 81 mg by mouth daily, Disp: , Rfl:     magnesium gluconate (MAGONATE) 500 MG tablet, Take 500 mg by mouth daily, Disp: , Rfl:   Allergies   Allergen Reactions    Bactrim [Sulfamethoxazole-Trimethoprim]      DELONTE    Crestor [Rosuvastatin Calcium]     Lovastatin     Vytorin [Ezetimibe-Simvastatin]      Muscle pain       Past Medical History:   Diagnosis Date    Carotid bruit 01/25/2019    Cataract 07/2011    Juancarlos Martinez did surgery    Greater trochanteric bursitis 2019    Hyperlipidemia     Hypertension     Kidney insufficiency 2016    Kidney stones 04/2017    Osteoarthritis     Anand Bey PVD (posterior vitreous detachment), bilateral     Type 2 PERRL.  ENMT: External ears WNL. Tympanic membranes are intact. External nose WNL. Neck: Supple and symmetric. Palpation reveals no adenopathy. No masses appreciated. Thyroid:  no nodule appreciated or thyromegaly. No jugular venous distention. Resp: Respirations are unlabored. Respiration rate is normal. Auscultate mildly decreased airflow. Expiratory wheeze posterior lung superiorly. No rhonchorous sounds. No dullness to percussion  CV: Rhythm is regular. S1 is normal. S2 is normal. Carotids with bruits bilaterally. Abdominal aorta:  not palpable. Pedal pulses: 2+ and equal bilaterally. Extremities: No clubbing, cyanosis or edema. Abdomen: Bowel sounds are normoactive. Palpation reveals softness, with no CVA tenderness,  distension, organomegaly, rebound tenderness or tenderness. No abdominal masses palpable. No  palpable hepatosplenomegaly. Musculo: Walks with a limping gait. Upper Extremities: ROM: Discomfort, limitation, pain and pain on  resistance with movement of the left upper extremity. Lower Extremities: ROM: Crepitation and DJD  changes of the lower extremities. Skin: Discolored skin lesion right arm posterior. Approximately 1 cm in diameter. Neuro: Alert and oriented x3. Mood is normal. Affect is normal. Speech is articulate and fluent. DTR's are symmetric, intact and 2+ bilaterally, vibratory sense of the lower extremity is intact. Pilar Rivas was seen today for pre-op exam.    Diagnoses and all orders for this visit:    Primary osteoarthritis of right knee  -     EKG 12 Lead  -     CBC Auto Differential; Future  -     Basic Metabolic Panel; Future  -     URINALYSIS; Future  -     Culture, MRSA, Screening; Future    Essential hypertension  -     EKG 12 Lead  -     CBC Auto Differential; Future  -     Basic Metabolic Panel; Future  -     URINALYSIS; Future  -     Culture, MRSA, Screening;  Future    Type 2 diabetes mellitus without complication, without long-term current use of insulin ([address]  - EKG 12 Lead  -     CBC Auto Differential; Future  -     Basic Metabolic Panel; Future  -     URINALYSIS; Future  -     Culture, MRSA, Screening; Future    Mixed hyperlipidemia  -     EKG 12 Lead  -     CBC Auto Differential; Future  -     Basic Metabolic Panel; Future  -     URINALYSIS; Future  -     Culture, MRSA, Screening; Future    Preoperative testing including EKG, CBC, BMP, urinalysis and MRSA screening of the nares will be done. Hemoglobin A1c today was 6.7. Once these items are reviewed the patient will be medically optimized for surgery.

## 2020-09-14 ENCOUNTER — HOSPITAL ENCOUNTER (OUTPATIENT)
Age: 80
Discharge: HOME | End: 2020-09-16

## 2020-09-14 LAB
ABO/RH: NORMAL
ANION GAP SERPL CALCULATED.3IONS-SCNC: 13 MMOL/L (ref 7–16)
ANTIBODY IDENTIFICATION: NORMAL
ANTIBODY SCREEN: NORMAL
BUN BLDV-MCNC: 27 MG/DL (ref 8–23)
CALCIUM SERPL-MCNC: 9.6 MG/DL (ref 8.6–10.2)
CHLORIDE BLD-SCNC: 103 MMOL/L (ref 98–107)
CO2: 20 MMOL/L (ref 22–29)
CREAT SERPL-MCNC: 1.3 MG/DL (ref 0.7–1.2)
DAT POLYSPECIFIC: NORMAL
DR. NOTIFY: NORMAL
GFR AFRICAN AMERICAN: >60
GFR NON-AFRICAN AMERICAN: 53 ML/MIN/1.73
GLUCOSE BLD-MCNC: 122 MG/DL (ref 74–99)
HCT VFR BLD CALC: 40.3 % (ref 37–54)
HEMOGLOBIN: 13.1 G/DL (ref 12.5–16.5)
MCH RBC QN AUTO: 28.2 PG (ref 26–35)
MCHC RBC AUTO-ENTMCNC: 32.5 % (ref 32–34.5)
MCV RBC AUTO: 86.7 FL (ref 80–99.9)
PDW BLD-RTO: 13.5 FL (ref 11.5–15)
PLATELET # BLD: 315 E9/L (ref 130–450)
PMV BLD AUTO: 11.7 FL (ref 7–12)
POTASSIUM SERPL-SCNC: 4.3 MMOL/L (ref 3.5–5)
RBC # BLD: 4.65 E12/L (ref 3.8–5.8)
SODIUM BLD-SCNC: 136 MMOL/L (ref 132–146)
WBC # BLD: 5.4 E9/L (ref 4.5–11.5)

## 2020-09-14 PROCEDURE — 86900 BLOOD TYPING SEROLOGIC ABO: CPT

## 2020-09-14 PROCEDURE — 86850 RBC ANTIBODY SCREEN: CPT

## 2020-09-14 PROCEDURE — 87081 CULTURE SCREEN ONLY: CPT

## 2020-09-14 PROCEDURE — 86901 BLOOD TYPING SEROLOGIC RH(D): CPT

## 2020-09-14 PROCEDURE — 85027 COMPLETE CBC AUTOMATED: CPT

## 2020-09-14 PROCEDURE — 86922 COMPATIBILITY TEST ANTIGLOB: CPT

## 2020-09-14 PROCEDURE — 86870 RBC ANTIBODY IDENTIFICATION: CPT

## 2020-09-14 PROCEDURE — 80048 BASIC METABOLIC PNL TOTAL CA: CPT

## 2020-09-14 PROCEDURE — 86880 COOMBS TEST DIRECT: CPT

## 2020-09-16 ENCOUNTER — TELEPHONE (OUTPATIENT)
Dept: PHARMACY | Facility: CLINIC | Age: 80
End: 2020-09-16

## 2020-09-16 LAB — MRSA CULTURE ONLY: NORMAL

## 2020-09-16 NOTE — TELEPHONE ENCOUNTER
CLINICAL PHARMACY: ADHERENCE REVIEW  Identified care gap per [employer]; fills at [facility] : Diabetes and Statin adherence    Last Office Visit: 9/8/20    Patient also appears to be taking: Metformin 1000mg,  Simvastatin 20mg, Gemfibrozil 600mg     ASSESSMENT  ACE/ARB ADHERENCE     Gemfibrozil last filled on 6/17/20 for a 90 day supply. 1 refills remaining. Billed through [employer]    BP Readings from Last 3 Encounters:   09/08/20 130/72   06/17/20 138/60   02/12/20 126/78     Estimated Creatinine Clearance: 46 mL/min (A) (based on SCr of 1.3 mg/dL (H)). DIABETES ADHERENCE     Per dispense report   Metformin last filled on 8/21/20 for a 90 day supply. 1 refills remaining. Billed through [email or web address]   Component Value Date    LABA1C 6.7 09/08/2020    LABA1C 7.2 06/17/2020    LABA1C 6.6 02/12/2020       STATIN ADHERENCE  PDC: 76%     Simvastatin last filled on 6/17/20 for a 90 day supply. 1 refills remaining. Billed through [email or web address]   Component Value Date    CHOL 178 10/16/2019    TRIG 129 10/16/2019    HDL 39 10/16/2019    LDLCALC 113 (H) 10/16/2019     ALT   Date Value Ref Range Status   06/22/2020 13 0 - 40 U/L Final     AST   Date Value Ref Range Status   06/22/2020 15 0 - 39 U/L Final     The ASCVD Risk score (Freddie Albrecht. et al., 2013) failed to calculate for the following reasons:    Unable to determine if patient is Non-      PLAN  Reached patient to review.     Patient stated his wife handles all his prescriptions and she'll be back at 3pm and would like me to call at that time to get more information

## 2020-09-17 ENCOUNTER — TELEPHONE (OUTPATIENT)
Dept: FAMILY MEDICINE CLINIC | Age: 80
End: 2020-09-17

## 2020-09-17 NOTE — TELEPHONE ENCOUNTER
Beverley Hdez from [employer] called to ask if something could be faxed over to state the pt is medically cleared for his upcoming surgery on 9/22/20.     F#  [phone]

## 2020-09-18 NOTE — TELEPHONE ENCOUNTER
Spoke with wife and she stated he is taking his medications daily and just got metformin filled on 9/13/20 for a 90ds and Simvastatin was filled on 9/11/20 for a 90ds as well. He is going to have surgery this upcoming Tuesday and will have to stop taking metformin. She will talk to his provider to see how long he should be off this medication. CLINICAL PHARMACY CONSULT: MED RECONCILIATION/REVIEW ADDENDUM    For Pharmacy Admin Tracking Only    PHSO: Yes  Total # of Interventions Recommended: 2  - New Order #: 0 New Medication Order Reason(s):  Adherence  - Maintenance Safety Lab Monitoring #: 1  - New Therapy Lab Monitoring #: 0  Recommended intervention potential cost savings: 0  Total Interventions Accepted: 0  Time Spent (min): [address] [facility]

## 2020-09-23 ENCOUNTER — HOSPITAL ENCOUNTER (OUTPATIENT)
Age: 80
Discharge: HOME | End: 2020-09-25

## 2020-09-23 LAB
ANION GAP SERPL CALCULATED.3IONS-SCNC: 10 MMOL/L (ref 7–16)
BASOPHILS ABSOLUTE: 0.02 E9/L (ref 0–0.2)
BASOPHILS RELATIVE PERCENT: 0.2 % (ref 0–2)
BUN BLDV-MCNC: 23 MG/DL (ref 8–23)
CALCIUM SERPL-MCNC: 7.9 MG/DL (ref 8.6–10.2)
CHLORIDE BLD-SCNC: 109 MMOL/L (ref 98–107)
CO2: 20 MMOL/L (ref 22–29)
CREAT SERPL-MCNC: 1.4 MG/DL (ref 0.7–1.2)
EOSINOPHILS ABSOLUTE: 0.76 E9/L (ref 0.05–0.5)
EOSINOPHILS RELATIVE PERCENT: 8.2 % (ref 0–6)
GFR AFRICAN AMERICAN: 59
GFR NON-AFRICAN AMERICAN: 49 ML/MIN/1.73
GLUCOSE BLD-MCNC: 130 MG/DL (ref 74–99)
HCT VFR BLD CALC: 33.7 % (ref 37–54)
HEMOGLOBIN: 10.6 G/DL (ref 12.5–16.5)
IMMATURE GRANULOCYTES #: 0.05 E9/L
IMMATURE GRANULOCYTES %: 0.5 % (ref 0–5)
INR BLD: 1.1
LYMPHOCYTES ABSOLUTE: 1.39 E9/L (ref 1.5–4)
LYMPHOCYTES RELATIVE PERCENT: 14.9 % (ref 20–42)
MCH RBC QN AUTO: 27.9 PG (ref 26–35)
MCHC RBC AUTO-ENTMCNC: 31.5 % (ref 32–34.5)
MCV RBC AUTO: 88.7 FL (ref 80–99.9)
MONOCYTES ABSOLUTE: 0.78 E9/L (ref 0.1–0.95)
MONOCYTES RELATIVE PERCENT: 8.4 % (ref 2–12)
NEUTROPHILS ABSOLUTE: 6.32 E9/L (ref 1.8–7.3)
NEUTROPHILS RELATIVE PERCENT: 67.8 % (ref 43–80)
PDW BLD-RTO: 13.7 FL (ref 11.5–15)
PLATELET # BLD: 249 E9/L (ref 130–450)
PMV BLD AUTO: 11.9 FL (ref 7–12)
POTASSIUM SERPL-SCNC: 4.6 MMOL/L (ref 3.5–5)
PROTHROMBIN TIME: 12.8 SEC (ref 9.3–12.4)
RBC # BLD: 3.8 E12/L (ref 3.8–5.8)
SODIUM BLD-SCNC: 139 MMOL/L (ref 132–146)
WBC # BLD: 9.3 E9/L (ref 4.5–11.5)

## 2020-09-23 PROCEDURE — 85025 COMPLETE CBC W/AUTO DIFF WBC: CPT

## 2020-09-23 PROCEDURE — 80048 BASIC METABOLIC PNL TOTAL CA: CPT

## 2020-09-23 PROCEDURE — 85610 PROTHROMBIN TIME: CPT

## 2020-09-25 LAB
BLOOD BANK DISPENSE STATUS: NORMAL
BLOOD BANK PRODUCT CODE: NORMAL
BPU ID: NORMAL
DESCRIPTION BLOOD BANK: NORMAL

## 2020-10-20 ENCOUNTER — OFFICE VISIT (OUTPATIENT)
Dept: FAMILY MEDICINE CLINIC | Age: 80
End: 2020-10-20
Payer: MEDICARE

## 2020-10-20 VITALS
DIASTOLIC BLOOD PRESSURE: 68 MMHG | HEIGHT: 69 IN | HEART RATE: 73 BPM | WEIGHT: 163 LBS | SYSTOLIC BLOOD PRESSURE: 122 MMHG | BODY MASS INDEX: 24.14 KG/M2 | OXYGEN SATURATION: 97 %

## 2020-10-20 PROBLEM — Z96.651 HX OF TOTAL KNEE ARTHROPLASTY, RIGHT: Status: ACTIVE | Noted: 2020-10-20

## 2020-10-20 PROCEDURE — 90694 VACC AIIV4 NO PRSRV 0.5ML IM: CPT | Performed by: INTERNAL MEDICINE

## 2020-10-20 PROCEDURE — 99214 OFFICE O/P EST MOD 30 MIN: CPT | Performed by: INTERNAL MEDICINE

## 2020-10-20 PROCEDURE — G0008 ADMIN INFLUENZA VIRUS VAC: HCPCS | Performed by: INTERNAL MEDICINE

## 2020-10-20 RX ORDER — TAMSULOSIN HYDROCHLORIDE 0.4 MG/1
0.4 CAPSULE ORAL DAILY
Qty: 90 CAPSULE | Refills: 1 | Status: SHIPPED
Start: 2020-10-20 | End: 2021-02-23 | Stop reason: SDUPTHER

## 2020-10-20 RX ORDER — GEMFIBROZIL 600 MG/1
TABLET, FILM COATED ORAL
Qty: 180 TABLET | Refills: 1 | Status: SHIPPED
Start: 2020-10-20 | End: 2021-02-23 | Stop reason: SDUPTHER

## 2020-10-20 RX ORDER — ATENOLOL 25 MG/1
TABLET ORAL
Qty: 90 TABLET | Refills: 1 | Status: SHIPPED
Start: 2020-10-20 | End: 2021-02-23 | Stop reason: SDUPTHER

## 2020-10-20 RX ORDER — SIMVASTATIN 20 MG
20 TABLET ORAL NIGHTLY
Qty: 90 TABLET | Refills: 1 | Status: SHIPPED
Start: 2020-10-20 | End: 2021-02-23 | Stop reason: SDUPTHER

## 2020-10-20 RX ORDER — AMLODIPINE BESYLATE 5 MG/1
5 TABLET ORAL DAILY
Qty: 90 TABLET | Refills: 1 | Status: SHIPPED
Start: 2020-10-20 | End: 2021-02-23 | Stop reason: ALTCHOICE

## 2020-10-20 NOTE — PROGRESS NOTES
19  John Funes : 1940 Sex: male  Age: [de-identified] y.o. Chief Complaint   Patient presents with    Arthritis       Status post hospitalization for right total knee arthroplasty. Patient did have postoperative anemia and urinary retention which required Millan catheter placement. He is now urinating without difficulty. Patient is currently completed home physical therapy and he will start physical therapy at [facility] this week. We did discuss possibly using pain medication before the physical therapy sessions which would make them less painful and more productive. Patient is not having any shortness of breath. He denies any pleuritic pain. He denies fever, chills, sweats. He has had no night sweats. Hypertension     Hyperlipidemia         Review of Systems  Health Maintenance:  Influenza Vaccination - (10/20/2020)  Colonoscopy Screening - (2014)  Colonoscopy - (2009)  Colonoscopy - (2014)  Colonoscopy -  [place],  [place] diverticular dx next , -SEE REPORT  Prostate Exam - () SCOLERI  Psa Test - ()  Physical Exam -  Rectal Exam - (2014)  Zoster Vaccine - ()  Pneumonia Vaccination - ()  Eye Exam - () VANCE- ADVISED REPEAT VISIT 17  Prevnar - (3/18/2015)  Urine Microalbumin - (19)  Dilated Eye Exam - (2020) ADVISED  Diabetic Foot Exam - (19)  1. Noninsulin requiring diabetes. 2. Hyperlipidemia. 3. Hypertension. 4. Osteoarthritis. SHANNON Arriaza 2020  5. CATARACT- VANCE DID SURGERY   6. KIDNEY INSUFFICIENCY   7. KIDNEY STONES--  8. DELONTE- ?? [facility], [facility]   9. CAROTID BRUIT-US ORDERED 19  10. LEFT GREATER TROCHANTERIC BUSITIS 19  11. PVD BY CT   Surgical Hx:  Cataract Removal -  VANCE  Transurethral Resection Of Prostate (TURP) - LIMBERT  Right total knee arthroplasty-2020  Reviewed and updated. FH:  Father:  . (Hx)  Mother:  . (Hx)  Reviewed, no changes.   SH: Patient is . Personal Habits: Former cigarette smoker, smoked 1 pack daily. Drinks alcohol occasionally. Does  housework daily. Reviewed, no changes. Date: 9/8/2020  Was the patient queried about smoking behavior? Yes   Does the patient currently smoke? Smoking: Patient is a former smoker. Was the patient counseled about smoking cessation?  Yes       Current Outpatient Medications:     amLODIPine (NORVASC) 5 MG tablet, Take 1 tablet by mouth daily, Disp: 90 tablet, Rfl: 1    atenolol (TENORMIN) 25 MG tablet, TAKE ONE TABLET BY MOUTH DAILY, Disp: 90 tablet, Rfl: 1    gemfibrozil (LOPID) 600 MG tablet, TAKE ONE TABLET BY MOUTH TWO TIMES A DAY BEFORE MEALS, Disp: 180 tablet, Rfl: 1    metFORMIN (GLUCOPHAGE) 1000 MG tablet, Take 1 tablet by mouth 2 times daily (with meals), Disp: 180 tablet, Rfl: 1    simvastatin (ZOCOR) 20 MG tablet, Take 1 tablet by mouth nightly, Disp: 90 tablet, Rfl: 1    tamsulosin (FLOMAX) 0.4 MG capsule, Take 1 capsule by mouth daily, Disp: 90 capsule, Rfl: 1    Multiple Vitamins-Minerals (CENTRUM SILVER PO), Take by mouth daily, Disp: , Rfl:     aspirin 81 MG tablet, Take 81 mg by mouth daily, Disp: , Rfl:     magnesium gluconate (MAGONATE) 500 MG tablet, Take 500 mg by mouth daily, Disp: , Rfl:   Allergies   Allergen Reactions    Bactrim [Sulfamethoxazole-Trimethoprim]      DELONTE    Crestor [Rosuvastatin Calcium]     Lovastatin     Vytorin [Ezetimibe-Simvastatin]      Muscle pain       Past Medical History:   Diagnosis Date    Carotid bruit 01/25/2019    Cataract 07/2011    Leidy Frank did surgery    Greater trochanteric bursitis 2019    Hyperlipidemia     Hypertension     Kidney insufficiency 2016    Kidney stones 04/2017    Osteoarthritis     Chantel Rutherford    PVD (posterior vitreous detachment), bilateral     Type 2 diabetes mellitus without complication [facility])      Past Surgical History:   Procedure Laterality Date    CATARACT REMOVAL Bilateral 07/2011    [place] [place] Travis No family history on file. Social History     Socioeconomic History    Marital status:      Spouse name: Not on file    Number of children: Not on file    Years of education: Not on file    Highest education level: Not on file   Occupational History    Not on file   Social Needs    Financial resource strain: Not on file    Food insecurity     Worry: Not on file     Inability: Not on file    Transportation needs     Medical: Not on file     Non-medical: Not on file   Tobacco Use    Smoking status: Former Smoker     Packs/day: 1.00    Smokeless tobacco: Never Used   Substance and Sexual Activity    Alcohol use: Yes     Comment: Occassionally    Drug use: Not on file    Sexual activity: Not on file   Lifestyle    Physical activity     Days per week: Not on file     Minutes per session: Not on file    Stress: Not on file   Relationships    Social connections     Talks on phone: Not on file     Gets together: Not on file     Attends Buddhist service: Not on file     Active member of club or organization: Not on file     Attends meetings of clubs or organizations: Not on file     Relationship status: Not on file    Intimate partner violence     Fear of current or ex partner: Not on file     Emotionally abused: Not on file     Physically abused: Not on file     Forced sexual activity: Not on file   Other Topics Concern    Not on file   Social History Narrative    Not on file       Vitals:    10/20/20 0944   BP: 122/68   Pulse: 73   SpO2: 97%   Weight: 163 lb (73.9 kg)   Height: 5' 9\" (1.753 m)       Physical Exam  Objective  Vitals:    10/20/20 0944   BP: 122/68   Pulse: 73   SpO2: 97%     Exam:  Const: Appears healthy, well developed and well nourished. Appears to weigh within normal range. Eyes: EOMI in both eyes. PERRL. ENMT: External ears WNL. Tympanic membranes are intact. External nose WNL. Neck: Supple and symmetric. Palpation reveals no adenopathy. No masses appreciated. Thyroid:  no nodule appreciated or thyromegaly. No jugular venous distention. Resp: Respirations are unlabored. Respiration rate is normal. Auscultate mildly decreased airflow. Expiratory wheeze posterior lung superiorly. No rhonchorous sounds. No dullness to percussion  CV: Rhythm is regular. S1 is normal. S2 is normal. Carotids with bruits bilaterally. Abdominal aorta:  not palpable. Pedal pulses: 2+ and equal bilaterally. Extremities: No clubbing, cyanosis or edema. Abdomen: Bowel sounds are normoactive. Palpation reveals softness, with no CVA tenderness,  distension, organomegaly, rebound tenderness or tenderness. No abdominal masses palpable. No  palpable hepatosplenomegaly. Musculo: Walks with a limping gait. Upper Extremities: ROM: Discomfort, limitation, pain and pain on  resistance with movement of the left upper extremity. Lower Extremities: ROM: Crepitation and DJD  changes of the lower extremities. Skin:   Neuro: Alert and oriented x3. Mood is normal. Affect is normal. Speech is articulate and fluent. DTR's are symmetric, intact and 2+ bilaterally, vibratory sense of the lower extremity is intact. Coco Sanchez was seen today for arthritis. Diagnoses and all orders for this visit:    Postoperative anemia  -     CBC Auto Differential; Future  -     Iron and TIBC; Future  -     Ferritin; Future    Essential hypertension  -     atenolol (TENORMIN) 25 MG tablet; TAKE ONE TABLET BY MOUTH DAILY    Mixed hyperlipidemia  -     gemfibrozil (LOPID) 600 MG tablet; TAKE ONE TABLET BY MOUTH TWO TIMES A DAY BEFORE MEALS  -     simvastatin (ZOCOR) 20 MG tablet; Take 1 tablet by mouth nightly    Type 2 diabetes mellitus without complication, without long-term current use of insulin (HCC)  -     metFORMIN (GLUCOPHAGE) 1000 MG tablet; Take 1 tablet by mouth 2 times daily (with meals)    Hx of total knee arthroplasty, right    Other orders  -     amLODIPine (NORVASC) 5 MG tablet;  Take 1 tablet by mouth daily  - tamsulosin (FLOMAX) 0.4 MG capsule; Take 1 capsule by mouth daily  -     INFLUENZA, QUADV, ADJUVANTED, 72 YRS =, IM, PF, PREFILL SYR, 0.5ML (FLUAD)    Patient will have blood work today because of postoperative anemia. Iron stores will also be checked. The patient is to take opioid medications prior to physical therapy. It is important that he not drive to physical therapy. I will see the patient back as scheduled in January.

## 2020-10-21 ENCOUNTER — HOSPITAL ENCOUNTER (OUTPATIENT)
Age: 80
Discharge: HOME | End: 2020-10-23
Payer: MEDICARE

## 2020-10-21 LAB
BASOPHILS ABSOLUTE: 0.02 E9/L (ref 0–0.2)
BASOPHILS RELATIVE PERCENT: 0.3 % (ref 0–2)
EOSINOPHILS ABSOLUTE: 1.53 E9/L (ref 0.05–0.5)
EOSINOPHILS RELATIVE PERCENT: 19.5 % (ref 0–6)
FERRITIN: 95 NG/ML
HCT VFR BLD CALC: 39 % (ref 37–54)
HEMOGLOBIN: 12.1 G/DL (ref 12.5–16.5)
IMMATURE GRANULOCYTES #: 0.05 E9/L
IMMATURE GRANULOCYTES %: 0.6 % (ref 0–5)
IRON SATURATION: 22 % (ref 20–55)
IRON: 88 MCG/DL (ref 59–158)
LYMPHOCYTES ABSOLUTE: 3 E9/L (ref 1.5–4)
LYMPHOCYTES RELATIVE PERCENT: 38.2 % (ref 20–42)
MCH RBC QN AUTO: 27.4 PG (ref 26–35)
MCHC RBC AUTO-ENTMCNC: 31 % (ref 32–34.5)
MCV RBC AUTO: 88.4 FL (ref 80–99.9)
MONOCYTES ABSOLUTE: 0.51 E9/L (ref 0.1–0.95)
MONOCYTES RELATIVE PERCENT: 6.5 % (ref 2–12)
NEUTROPHILS ABSOLUTE: 2.75 E9/L (ref 1.8–7.3)
NEUTROPHILS RELATIVE PERCENT: 34.9 % (ref 43–80)
PDW BLD-RTO: 13.9 FL (ref 11.5–15)
PLATELET # BLD: 344 E9/L (ref 130–450)
PMV BLD AUTO: 11.5 FL (ref 7–12)
RBC # BLD: 4.41 E12/L (ref 3.8–5.8)
TOTAL IRON BINDING CAPACITY: 403 MCG/DL (ref 250–450)
WBC # BLD: 7.9 E9/L (ref 4.5–11.5)

## 2020-10-21 PROCEDURE — 83550 IRON BINDING TEST: CPT

## 2020-10-21 PROCEDURE — 85025 COMPLETE CBC W/AUTO DIFF WBC: CPT

## 2020-10-21 PROCEDURE — 36415 COLL VENOUS BLD VENIPUNCTURE: CPT

## 2020-10-21 PROCEDURE — 83540 ASSAY OF IRON: CPT

## 2020-10-21 PROCEDURE — 82728 ASSAY OF FERRITIN: CPT

## 2021-01-22 ENCOUNTER — IMMUNIZATION (OUTPATIENT)
Dept: PRIMARY CARE CLINIC | Age: 81
End: 2021-01-22
Payer: MEDICARE

## 2021-01-22 DIAGNOSIS — Z23 NEED FOR VACCINATION: Primary | ICD-10-CM

## 2021-01-22 PROCEDURE — 0001A COVID-19, PFIZER VACCINE 30MCG/0.3ML DOSE: CPT | Performed by: PHYSICIAN ASSISTANT

## 2021-01-22 PROCEDURE — 91300 COVID-19, PFIZER VACCINE 30MCG/0.3ML DOSE: CPT | Performed by: PHYSICIAN ASSISTANT

## 2021-02-12 ENCOUNTER — IMMUNIZATION (OUTPATIENT)
Dept: PRIMARY CARE CLINIC | Age: 81
End: 2021-02-12
Payer: MEDICARE

## 2021-02-12 PROCEDURE — 0002A COVID-19, PFIZER VACCINE 30MCG/0.3ML DOSE: CPT | Performed by: PHYSICIAN ASSISTANT

## 2021-02-12 PROCEDURE — 91300 COVID-19, PFIZER VACCINE 30MCG/0.3ML DOSE: CPT | Performed by: PHYSICIAN ASSISTANT

## 2021-02-23 ENCOUNTER — OFFICE VISIT (OUTPATIENT)
Dept: FAMILY MEDICINE CLINIC | Age: 81
End: 2021-02-23
Payer: MEDICARE

## 2021-02-23 VITALS
SYSTOLIC BLOOD PRESSURE: 152 MMHG | HEART RATE: 67 BPM | WEIGHT: 168 LBS | OXYGEN SATURATION: 97 % | TEMPERATURE: 97.5 F | BODY MASS INDEX: 24.81 KG/M2 | DIASTOLIC BLOOD PRESSURE: 70 MMHG | RESPIRATION RATE: 16 BRPM

## 2021-02-23 DIAGNOSIS — D50.9 IRON DEFICIENCY ANEMIA, UNSPECIFIED IRON DEFICIENCY ANEMIA TYPE: ICD-10-CM

## 2021-02-23 DIAGNOSIS — I10 ESSENTIAL HYPERTENSION: ICD-10-CM

## 2021-02-23 DIAGNOSIS — M17.0 PRIMARY OSTEOARTHRITIS OF BOTH KNEES: ICD-10-CM

## 2021-02-23 DIAGNOSIS — E11.9 TYPE 2 DIABETES MELLITUS WITHOUT COMPLICATION, WITHOUT LONG-TERM CURRENT USE OF INSULIN (HCC): ICD-10-CM

## 2021-02-23 DIAGNOSIS — E78.2 MIXED HYPERLIPIDEMIA: ICD-10-CM

## 2021-02-23 LAB
ALBUMIN SERPL-MCNC: 4.6 G/DL (ref 3.5–5.2)
ALP BLD-CCNC: 66 U/L (ref 40–129)
ALT SERPL-CCNC: 11 U/L (ref 0–40)
ANION GAP SERPL CALCULATED.3IONS-SCNC: 13 MMOL/L (ref 7–16)
AST SERPL-CCNC: 14 U/L (ref 0–39)
BASOPHILS ABSOLUTE: 0.03 E9/L (ref 0–0.2)
BASOPHILS RELATIVE PERCENT: 0.5 % (ref 0–2)
BILIRUB SERPL-MCNC: <0.2 MG/DL (ref 0–1.2)
BUN BLDV-MCNC: 30 MG/DL (ref 8–23)
CALCIUM SERPL-MCNC: 9.3 MG/DL (ref 8.6–10.2)
CHLORIDE BLD-SCNC: 104 MMOL/L (ref 98–107)
CO2: 20 MMOL/L (ref 22–29)
CREAT SERPL-MCNC: 1.3 MG/DL (ref 0.7–1.2)
EOSINOPHILS ABSOLUTE: 1.2 E9/L (ref 0.05–0.5)
EOSINOPHILS RELATIVE PERCENT: 19.4 % (ref 0–6)
FERRITIN: 54 NG/ML
GFR AFRICAN AMERICAN: >60
GFR NON-AFRICAN AMERICAN: 53 ML/MIN/1.73
GLUCOSE BLD-MCNC: 186 MG/DL (ref 74–99)
HBA1C MFR BLD: 6.8 %
HCT VFR BLD CALC: 39.7 % (ref 37–54)
HEMOGLOBIN: 12.7 G/DL (ref 12.5–16.5)
IMMATURE GRANULOCYTES #: 0.03 E9/L
IMMATURE GRANULOCYTES %: 0.5 % (ref 0–5)
IRON SATURATION: 22 % (ref 20–55)
IRON: 101 MCG/DL (ref 59–158)
LYMPHOCYTES ABSOLUTE: 2.81 E9/L (ref 1.5–4)
LYMPHOCYTES RELATIVE PERCENT: 45.3 % (ref 20–42)
MCH RBC QN AUTO: 27.9 PG (ref 26–35)
MCHC RBC AUTO-ENTMCNC: 32 % (ref 32–34.5)
MCV RBC AUTO: 87.1 FL (ref 80–99.9)
MONOCYTES ABSOLUTE: 0.49 E9/L (ref 0.1–0.95)
MONOCYTES RELATIVE PERCENT: 7.9 % (ref 2–12)
NEUTROPHILS ABSOLUTE: 1.64 E9/L (ref 1.8–7.3)
NEUTROPHILS RELATIVE PERCENT: 26.4 % (ref 43–80)
PDW BLD-RTO: 13.8 FL (ref 11.5–15)
PLATELET # BLD: 296 E9/L (ref 130–450)
PMV BLD AUTO: 11.3 FL (ref 7–12)
POTASSIUM SERPL-SCNC: 4.3 MMOL/L (ref 3.5–5)
RBC # BLD: 4.56 E12/L (ref 3.8–5.8)
SODIUM BLD-SCNC: 137 MMOL/L (ref 132–146)
TOTAL IRON BINDING CAPACITY: 450 MCG/DL (ref 250–450)
TOTAL PROTEIN: 7.3 G/DL (ref 6.4–8.3)
WBC # BLD: 6.2 E9/L (ref 4.5–11.5)

## 2021-02-23 PROCEDURE — 83036 HEMOGLOBIN GLYCOSYLATED A1C: CPT | Performed by: INTERNAL MEDICINE

## 2021-02-23 PROCEDURE — 3288F FALL RISK ASSESSMENT DOCD: CPT | Performed by: INTERNAL MEDICINE

## 2021-02-23 PROCEDURE — 99214 OFFICE O/P EST MOD 30 MIN: CPT | Performed by: INTERNAL MEDICINE

## 2021-02-23 RX ORDER — TAMSULOSIN HYDROCHLORIDE 0.4 MG/1
0.4 CAPSULE ORAL DAILY
Qty: 90 CAPSULE | Refills: 1 | Status: SHIPPED
Start: 2021-02-23 | End: 2022-09-15

## 2021-02-23 RX ORDER — AMLODIPINE BESYLATE AND BENAZEPRIL HYDROCHLORIDE 5; 10 MG/1; MG/1
1 CAPSULE ORAL DAILY
Qty: 30 CAPSULE | Refills: 3 | Status: SHIPPED
Start: 2021-02-23 | End: 2021-03-16 | Stop reason: SDUPTHER

## 2021-02-23 RX ORDER — ATENOLOL 25 MG/1
TABLET ORAL
Qty: 90 TABLET | Refills: 1 | Status: SHIPPED
Start: 2021-02-23 | End: 2021-08-04 | Stop reason: SDUPTHER

## 2021-02-23 RX ORDER — AMLODIPINE BESYLATE 5 MG/1
5 TABLET ORAL DAILY
Qty: 90 TABLET | Refills: 1 | Status: CANCELLED | OUTPATIENT
Start: 2021-02-23

## 2021-02-23 RX ORDER — HYDROCODONE BITARTRATE AND ACETAMINOPHEN 7.5; 325 MG/1; MG/1
1 TABLET ORAL EVERY 6 HOURS PRN
Qty: 28 TABLET | Refills: 0 | Status: SHIPPED | OUTPATIENT
Start: 2021-02-23 | End: 2021-03-02

## 2021-02-23 RX ORDER — GEMFIBROZIL 600 MG/1
TABLET, FILM COATED ORAL
Qty: 180 TABLET | Refills: 1 | Status: SHIPPED
Start: 2021-02-23 | End: 2021-08-04 | Stop reason: SDUPTHER

## 2021-02-23 RX ORDER — SIMVASTATIN 20 MG
20 TABLET ORAL NIGHTLY
Qty: 90 TABLET | Refills: 1 | Status: SHIPPED
Start: 2021-02-23 | End: 2021-08-04 | Stop reason: SDUPTHER

## 2021-02-23 ASSESSMENT — PATIENT HEALTH QUESTIONNAIRE - PHQ9
SUM OF ALL RESPONSES TO PHQ9 QUESTIONS 1 & 2: 0
SUM OF ALL RESPONSES TO PHQ QUESTIONS 1-9: 0
SUM OF ALL RESPONSES TO PHQ QUESTIONS 1-9: 0

## 2021-02-23 NOTE — PROGRESS NOTES
19  Lawrance Show : 1940 Sex: male  Age: [de-identified] y.o. No chief complaint on file. Patient's knees are functioning well. He now has some hip problems. He and I discussed his upcoming appointment up with orthopedics. He is contemplating total knee hip arthroplasty in September. He has difficulty sleeping at night at times because of hip pain. Even with changes in position etc. he has difficulty being comfortable. He did get both of his Covid vaccinations. He is denying cardiac or respiratory symptoms. Tolerating iron supplementation. He was iron deficient after his knee surgery. We will recheck that today. Patient denies any cardiac or respiratory symptoms including any palpitations. He does have an umbilical hernia which has been evaluated by surgery but it is not bothersome at this point and he does not wish this to be repaired. Hypertension    Hyperlipidemia        Review of Systems  Health Maintenance:  Influenza Vaccination - (10/20/2020)  Colonoscopy Screening - (2014)  Colonoscopy - (2009)  Colonoscopy - (2014)  Colonoscopy -  [place],  [place] diverticular dx next , -SEE REPORT  Prostate Exam - () SCOLERI  Psa Test - ()  Physical Exam -  Rectal Exam - (2014)  Zoster Vaccine - ()  Pneumonia Vaccination - ()  Eye Exam - () VANCE- ADVISED REPEAT VISIT 17  Prevnar - (3/18/2015)  Urine Microalbumin - (19)  Dilated Eye Exam - (2020) ADVISED  Diabetic Foot Exam - (19)  COVID VACCINE 2021  1. Noninsulin requiring diabetes. 2. Hyperlipidemia. 3. Hypertension. 4. Osteoarthritis. SHANNON Arriaza 2020  5. CATARACT- VANCE DID SURGERY   6. KIDNEY INSUFFICIENCY   7. KIDNEY STONES--  [facility]. [facility]   9. CAROTID BRUIT-US ORDERED 19  10. LEFT GREATER TROCHANTERIC BUSITIS 19  11.  PVD BY CT   Surgical Hx:  Cataract Removal -  VANCE  Transurethral Resection Of Prostate (TURP) - LIMBERT  Right total knee arthroplasty-9/23/2020  Reviewed and updated. FH:  Father:  . (Hx)  Mother:  . (Hx)  Reviewed, no changes. SH: Patient is . Personal Habits: Former cigarette smoker, smoked 1 pack daily. Drinks alcohol occasionally. Does  housework daily. Reviewed, no changes. Date: 9/8/2020  Was the patient queried about smoking behavior? Yes   Does the patient currently smoke? Smoking: Patient is a former smoker. Was the patient counseled about smoking cessation?  Yes       Current Outpatient Medications:     amLODIPine (NORVASC) 5 MG tablet, Take 1 tablet by mouth daily, Disp: 90 tablet, Rfl: 1    atenolol (TENORMIN) 25 MG tablet, TAKE ONE TABLET BY MOUTH DAILY, Disp: 90 tablet, Rfl: 1    gemfibrozil (LOPID) 600 MG tablet, TAKE ONE TABLET BY MOUTH TWO TIMES A DAY BEFORE MEALS, Disp: 180 tablet, Rfl: 1    metFORMIN (GLUCOPHAGE) 1000 MG tablet, Take 1 tablet by mouth 2 times daily (with meals), Disp: 180 tablet, Rfl: 1    simvastatin (ZOCOR) 20 MG tablet, Take 1 tablet by mouth nightly, Disp: 90 tablet, Rfl: 1    tamsulosin (FLOMAX) 0.4 MG capsule, Take 1 capsule by mouth daily, Disp: 90 capsule, Rfl: 1    Multiple Vitamins-Minerals (CENTRUM SILVER PO), Take by mouth daily, Disp: , Rfl:     aspirin 81 MG tablet, Take 81 mg by mouth daily, Disp: , Rfl:     magnesium gluconate (MAGONATE) 500 MG tablet, Take 500 mg by mouth daily, Disp: , Rfl:   Allergies   Allergen Reactions    Bactrim [Sulfamethoxazole-Trimethoprim]      DELONTE    Crestor [Rosuvastatin Calcium]     Lovastatin     Vytorin [Ezetimibe-Simvastatin]      Muscle pain       Past Medical History:   Diagnosis Date    Carotid bruit 01/25/2019    Cataract 07/2011    Shante Jewel did surgery    Greater trochanteric bursitis 2019    Hyperlipidemia     Hypertension     Kidney insufficiency 2016    Kidney stones 04/2017    Osteoarthritis     [place] PVD (posterior vitreous detachment), bilateral     Type 2 diabetes mellitus without complication [facility])      Past Surgical History:   Procedure Laterality Date    CATARACT REMOVAL Bilateral 07/2011    Shahram Travis     No family history on file. Social History     Socioeconomic History    Marital status:      Spouse name: Not on file    Number of children: Not on file    Years of education: Not on file    Highest education level: Not on file   Occupational History    Not on file   Social Needs    Financial resource strain: Not on file    Food insecurity     Worry: Not on file     Inability: Not on file    Transportation needs     Medical: Not on file     Non-medical: Not on file   Tobacco Use    Smoking status: Former Smoker     Packs/day: 1.00    Smokeless tobacco: Never Used   Substance and Sexual Activity    Alcohol use: Yes     Comment: Occassionally    Drug use: Not on file    Sexual activity: Not on file   Lifestyle    Physical activity     Days per week: Not on file     Minutes per session: Not on file    Stress: Not on file   Relationships    Social connections     Talks on phone: Not on file     Gets together: Not on file     Attends Hindu service: Not on file     Active member of club or organization: Not on file     Attends meetings of clubs or organizations: Not on file     Relationship status: Not on file    Intimate partner violence     Fear of current or ex partner: Not on file     Emotionally abused: Not on file     Physically abused: Not on file     Forced sexual activity: Not on file   Other Topics Concern    Not on file   Social History Narrative    Not on file       There were no vitals filed for this visit. Physical Exam  Objective  There were no vitals filed for this visit. Exam:  Const: Appears healthy, well developed and well nourished. Appears to weigh within normal range. Eyes: EOMI in both eyes. PERRL. ENMT: External ears WNL. Tympanic membranes are intact. External nose WNL.   Neck: Supple and symmetric. Palpation reveals no adenopathy. No masses appreciated. Thyroid:  no nodule appreciated or thyromegaly. No jugular venous distention. Resp: Respirations are unlabored. Respiration rate is normal. Auscultate mildly decreased airflow. Expiratory wheeze posterior lung superiorly. No rhonchorous sounds. No dullness to percussion  CV: Rhythm is regular. S1 is normal. S2 is normal. Carotids with bruits bilaterally. Abdominal aorta:  not palpable. Pedal pulses: 2+ and equal bilaterally. Extremities: No clubbing, cyanosis or edema. Abdomen: Bowel sounds are normoactive. Palpation reveals softness, with no CVA tenderness,  distension, organomegaly, rebound tenderness or tenderness. Only reducible umbilical hernia. . No  palpable hepatosplenomegaly. Musculo: Walks with a limping gait. Upper Extremities: ROM: Discomfort, limitation, pain and pain on  resistance with movement of the left upper extremity. Lower Extremities: ROM: Crepitation and DJD  changes of the lower extremities. Skin:   Neuro: Alert and oriented x3. Mood is normal. Affect is normal. Speech is articulate and fluent. DTR's are symmetric, intact and 2+ bilaterally, vibratory sense of the lower extremity is intact. Diagnoses and all orders for this visit:    Essential hypertension  -     atenolol (TENORMIN) 25 MG tablet; TAKE ONE TABLET BY MOUTH DAILY  -     Comprehensive Metabolic Panel; Future  -     Iron and TIBC; Future  -     Ferritin; Future  -     CBC Auto Differential; Future    Mixed hyperlipidemia  -     gemfibrozil (LOPID) 600 MG tablet; TAKE ONE TABLET BY MOUTH TWO TIMES A DAY BEFORE MEALS  -     simvastatin (ZOCOR) 20 MG tablet; Take 1 tablet by mouth nightly    Type 2 diabetes mellitus without complication, without long-term current use of insulin (HCC)  -     metFORMIN (GLUCOPHAGE) 1000 MG tablet;  Take 1 tablet by mouth 2 times daily (with meals)  -     POCT glycosylated hemoglobin (Hb A1C)    Primary osteoarthritis of both knees  -     HYDROcodone-acetaminophen (NORCO) 7.5-325 MG per tablet; Take 1 tablet by mouth every 6 hours as needed for Pain for up to 7 days. Intended supply: 7 days. Take lowest dose possible to manage pain    Iron deficiency anemia, unspecified iron deficiency anemia type  -     Iron and TIBC; Future  -     Ferritin; Future  -     CBC Auto Differential; Future    Other orders  -     tamsulosin (FLOMAX) 0.4 MG capsule; Take 1 capsule by mouth daily  -     amLODIPine-benazepril (LOTREL) 5-10 MG per capsule; Take 1 capsule by mouth daily    Patient's blood pressure is not adequately controlled. Patient will be placed on Lotrel 5/10. He is to be returning in approximately 3 weeks to reassess. Lab work today including further investigation of iron deficiency.   He will be seen back in 3 weeks

## 2021-03-16 ENCOUNTER — OFFICE VISIT (OUTPATIENT)
Dept: FAMILY MEDICINE CLINIC | Age: 81
End: 2021-03-16
Payer: MEDICARE

## 2021-03-16 VITALS
HEART RATE: 62 BPM | TEMPERATURE: 97.3 F | SYSTOLIC BLOOD PRESSURE: 136 MMHG | DIASTOLIC BLOOD PRESSURE: 74 MMHG | OXYGEN SATURATION: 96 % | BODY MASS INDEX: 24.81 KG/M2 | WEIGHT: 168 LBS

## 2021-03-16 DIAGNOSIS — E11.9 TYPE 2 DIABETES MELLITUS WITHOUT COMPLICATION, WITHOUT LONG-TERM CURRENT USE OF INSULIN (HCC): ICD-10-CM

## 2021-03-16 DIAGNOSIS — I10 ESSENTIAL HYPERTENSION: Primary | ICD-10-CM

## 2021-03-16 DIAGNOSIS — E78.2 MIXED HYPERLIPIDEMIA: ICD-10-CM

## 2021-03-16 PROCEDURE — 99213 OFFICE O/P EST LOW 20 MIN: CPT | Performed by: INTERNAL MEDICINE

## 2021-03-16 RX ORDER — AMLODIPINE BESYLATE AND BENAZEPRIL HYDROCHLORIDE 5; 10 MG/1; MG/1
1 CAPSULE ORAL DAILY
Qty: 90 CAPSULE | Refills: 3 | Status: SHIPPED
Start: 2021-03-16 | End: 2021-08-04 | Stop reason: SDUPTHER

## 2021-03-16 NOTE — PROGRESS NOTES
19  Lawrance Show : 1940 Sex: male  Age: [de-identified] y.o. Chief Complaint   Patient presents with    Other     3 week follow up       Blood pressure is under much better control with the change to Lotrel. Previous hyponatremia and this will need to be rechecked. He has received both Covid vaccinations. Other vaccines are up-to-date. No significant side effects from the blood pressure medication changes. Denies cardiac or respiratory symptoms. Denies any GI complaints. Hypertension    Hyperlipidemia    Other        Review of Systems  Health Maintenance:  Influenza Vaccination - (10/20/2020)  Colonoscopy Screening - (2014)  Colonoscopy - (2009)  Colonoscopy - (2014)  Colonoscopy -  LORENA,  lorena diverticular dx next -SEE REPORT  Prostate Exam - () SCOLERI  Psa Test - ()  Physical Exam -  Rectal Exam - (2014)  Zoster Vaccine - ()  Pneumonia Vaccination - ()  Eye Exam - () VANCE- ADVISED REPEAT VISIT 17  Prevnar - (3/18/2015)  Urine Microalbumin - (19)  Dilated Eye Exam - (2020) ADVISED  Diabetic Foot Exam - (19)  COVID VACCINE 2021  1. Noninsulin requiring diabetes. 2. Hyperlipidemia. 3. Hypertension. 4. Osteoarthritis. SHANNON Arriaza 2020  5. CATARACT- VANCE DID SURGERY   6. KIDNEY INSUFFICIENCY   7. KIDNEY STONES--  8. DELONTE- ?? [facility]   9. CAROTID BRUIT-US ORDERED 19  10. LEFT GREATER TROCHANTERIC BUSITIS 19  11. PVD BY CT   Surgical Hx:  Cataract Removal -  VANCE  Transurethral Resection Of Prostate (TURP) - LIMBERT  Right total knee arthroplasty-2020  Reviewed and updated. FH:  Father:  . (Hx)  Mother:  . (Hx)  Reviewed, no changes. SH: Patient is . Personal Habits: Former cigarette smoker, smoked 1 pack daily. Drinks alcohol occasionally. Does  housework daily. Reviewed, no changes.   Date: 2020  Was the patient queried about smoking behavior? Yes   Does the patient currently smoke? Smoking: Patient is a former smoker. Was the patient counseled about smoking cessation? Yes       Current Outpatient Medications:     atenolol (TENORMIN) 25 MG tablet, TAKE ONE TABLET BY MOUTH DAILY, Disp: 90 tablet, Rfl: 1    gemfibrozil (LOPID) 600 MG tablet, TAKE ONE TABLET BY MOUTH TWO TIMES A DAY BEFORE MEALS, Disp: 180 tablet, Rfl: 1    metFORMIN (GLUCOPHAGE) 1000 MG tablet, Take 1 tablet by mouth 2 times daily (with meals), Disp: 180 tablet, Rfl: 1    simvastatin (ZOCOR) 20 MG tablet, Take 1 tablet by mouth nightly, Disp: 90 tablet, Rfl: 1    tamsulosin (FLOMAX) 0.4 MG capsule, Take 1 capsule by mouth daily, Disp: 90 capsule, Rfl: 1    amLODIPine-benazepril (LOTREL) 5-10 MG per capsule, Take 1 capsule by mouth daily, Disp: 30 capsule, Rfl: 3    Multiple Vitamins-Minerals (CENTRUM SILVER PO), Take by mouth daily, Disp: , Rfl:     aspirin 81 MG tablet, Take 81 mg by mouth daily, Disp: , Rfl:     magnesium gluconate (MAGONATE) 500 MG tablet, Take 500 mg by mouth daily, Disp: , Rfl:   Allergies   Allergen Reactions    Bactrim [Sulfamethoxazole-Trimethoprim]      DELONTE    Crestor [Rosuvastatin Calcium]     Lovastatin     Vytorin [Ezetimibe-Simvastatin]      Muscle pain       Past Medical History:   Diagnosis Date    Carotid bruit 01/25/2019    Cataract 07/2011 Darden August did surgery    Greater trochanteric bursitis 2019    Hyperlipidemia     Hypertension     Kidney insufficiency 2016    Kidney stones 04/2017    Osteoarthritis     Kalyani Hunting    PVD (posterior vitreous detachment), bilateral     Type 2 diabetes mellitus without complication [facility])      Past Surgical History:   Procedure Laterality Date    CATARACT REMOVAL Bilateral 07/2011    Kellen Travis     No family history on file.   Social History     Socioeconomic History    Marital status:      Spouse name: Not on file    Number of children: Not on file    Years of education: Not on file    Highest education level: Not on file   Occupational History    Not on file   Social Needs    Financial resource strain: Not on file    Food insecurity     Worry: Not on file     Inability: Not on file    Transportation needs     Medical: Not on file     Non-medical: Not on file   Tobacco Use    Smoking status: Former Smoker     Packs/day: 1.00    Smokeless tobacco: Never Used   Substance and Sexual Activity    Alcohol use: Yes     Comment: Occassionally    Drug use: Not on file    Sexual activity: Not on file   Lifestyle    Physical activity     Days per week: Not on file     Minutes per session: Not on file    Stress: Not on file   Relationships    Social connections     Talks on phone: Not on file     Gets together: Not on file     Attends Shinto service: Not on file     Active member of club or organization: Not on file     Attends meetings of clubs or organizations: Not on file     Relationship status: Not on file    Intimate partner violence     Fear of current or ex partner: Not on file     Emotionally abused: Not on file     Physically abused: Not on file     Forced sexual activity: Not on file   Other Topics Concern    Not on file   Social History Narrative    Not on file       Vitals:    03/16/21 1559   BP: 136/74   Pulse: 62   Temp: 97.3 °F (36.3 °C)   SpO2: 96%   Weight: 168 lb (76.2 kg)       Physical Exam  Objective  Vitals:    03/16/21 1559   BP: 136/74   Pulse: 62   Temp: 97.3 °F (36.3 °C)   SpO2: 96%     Exam:  Const: Appears healthy, well developed and well nourished. Appears to weigh within normal range. Eyes: EOMI in both eyes. PERRL. ENMT: External ears WNL. Tympanic membranes are intact. External nose WNL. Neck: Supple and symmetric. Palpation reveals no adenopathy. No masses appreciated. Thyroid:  no nodule appreciated or thyromegaly. No jugular venous distention. Resp: Respirations are unlabored.  Respiration rate is normal. Auscultate mildly decreased airflow. No rhonchorous sounds. CV: Rhythm is regular. S1 is normal. S2 is normal. Carotids with bruits bilaterally. Abdominal aorta:  not palpable. Pedal pulses: 2+ and equal bilaterally. Extremities: No clubbing, cyanosis or edema. Abdomen: Bowel sounds are normoactive. Palpation reveals softness, with no CVA tenderness,  distension, organomegaly, rebound tenderness or tenderness. Only reducible umbilical hernia. . No  palpable hepatosplenomegaly. Musculo: Walks with a limping gait. Upper Extremities: ROM: Discomfort, limitation, pain and pain on  resistance with movement of the left upper extremity. Lower Extremities: ROM: Crepitation and DJD  changes of the lower extremities. Skin:   Neuro: Alert and oriented x3. Mood is normal. Affect is normal. Speech is articulate and fluent. DTR's are symmetric, intact and 2+ bilaterally, vibratory sense of the lower extremity is intact. Leticia Enriquez was seen today for other. Diagnoses and all orders for this visit:    Essential hypertension  -     Comprehensive Metabolic Panel; Future  -     Lipid Panel; Future    Mixed hyperlipidemia  -     Comprehensive Metabolic Panel; Future  -     Lipid Panel; Future    Type 2 diabetes mellitus without complication, without long-term current use of insulin (HCC)    Other orders  -     amLODIPine-benazepril (LOTREL) 5-10 MG per capsule; Take 1 capsule by mouth daily    Patient is tolerating blood pressure medication well without significant problems. The patient is to have lab work over this next week in [place]. Next appointment in [place].

## 2021-03-17 LAB
ALBUMIN SERPL-MCNC: 4.6 G/DL (ref 3.5–5.2)
ALP BLD-CCNC: 61 U/L (ref 40–129)
ALT SERPL-CCNC: 7 U/L (ref 0–40)
ANION GAP SERPL CALCULATED.3IONS-SCNC: 14 MMOL/L (ref 7–16)
AST SERPL-CCNC: 12 U/L (ref 0–39)
BILIRUB SERPL-MCNC: 0.2 MG/DL (ref 0–1.2)
BUN BLDV-MCNC: 34 MG/DL (ref 8–23)
CALCIUM SERPL-MCNC: 9.1 MG/DL (ref 8.6–10.2)
CHLORIDE BLD-SCNC: 104 MMOL/L (ref 98–107)
CHOLESTEROL, TOTAL: 156 MG/DL (ref 0–199)
CO2: 20 MMOL/L (ref 22–29)
CREAT SERPL-MCNC: 1.5 MG/DL (ref 0.7–1.2)
CREATININE URINE: 106 MG/DL (ref 40–278)
GFR AFRICAN AMERICAN: 54
GFR NON-AFRICAN AMERICAN: 45 ML/MIN/1.73
GLUCOSE BLD-MCNC: 109 MG/DL (ref 74–99)
HDLC SERPL-MCNC: 36 MG/DL
LDL CHOLESTEROL CALCULATED: 93 MG/DL (ref 0–99)
MICROALBUMIN UR-MCNC: <12 MG/L
MICROALBUMIN/CREAT UR-RTO: ABNORMAL (ref 0–30)
POTASSIUM SERPL-SCNC: 5.4 MMOL/L (ref 3.5–5)
SODIUM BLD-SCNC: 138 MMOL/L (ref 132–146)
TOTAL PROTEIN: 7.2 G/DL (ref 6.4–8.3)
TRIGL SERPL-MCNC: 135 MG/DL (ref 0–149)
VLDLC SERPL CALC-MCNC: 27 MG/DL

## 2021-03-18 DIAGNOSIS — E87.5 HYPERKALEMIA: ICD-10-CM

## 2021-03-18 DIAGNOSIS — I10 ESSENTIAL HYPERTENSION: Primary | ICD-10-CM

## 2021-08-04 ENCOUNTER — OFFICE VISIT (OUTPATIENT)
Dept: FAMILY MEDICINE CLINIC | Age: 81
End: 2021-08-04
Payer: MEDICARE

## 2021-08-04 VITALS
DIASTOLIC BLOOD PRESSURE: 70 MMHG | OXYGEN SATURATION: 98 % | BODY MASS INDEX: 23.95 KG/M2 | TEMPERATURE: 97.5 F | WEIGHT: 162.2 LBS | SYSTOLIC BLOOD PRESSURE: 138 MMHG | HEART RATE: 56 BPM

## 2021-08-04 DIAGNOSIS — I10 ESSENTIAL HYPERTENSION: ICD-10-CM

## 2021-08-04 DIAGNOSIS — E11.9 TYPE 2 DIABETES MELLITUS WITHOUT COMPLICATION, WITHOUT LONG-TERM CURRENT USE OF INSULIN (HCC): Primary | ICD-10-CM

## 2021-08-04 DIAGNOSIS — M17.0 PRIMARY OSTEOARTHRITIS OF BOTH KNEES: ICD-10-CM

## 2021-08-04 DIAGNOSIS — E78.2 MIXED HYPERLIPIDEMIA: ICD-10-CM

## 2021-08-04 LAB — HBA1C MFR BLD: 6.5 %

## 2021-08-04 PROCEDURE — 83036 HEMOGLOBIN GLYCOSYLATED A1C: CPT | Performed by: INTERNAL MEDICINE

## 2021-08-04 PROCEDURE — 99214 OFFICE O/P EST MOD 30 MIN: CPT | Performed by: INTERNAL MEDICINE

## 2021-08-04 RX ORDER — GEMFIBROZIL 600 MG/1
TABLET, FILM COATED ORAL
Qty: 180 TABLET | Refills: 1 | Status: SHIPPED
Start: 2021-08-04 | End: 2022-03-09 | Stop reason: SDUPTHER

## 2021-08-04 RX ORDER — AMLODIPINE BESYLATE AND BENAZEPRIL HYDROCHLORIDE 5; 10 MG/1; MG/1
1 CAPSULE ORAL DAILY
Qty: 90 CAPSULE | Refills: 3 | Status: SHIPPED
Start: 2021-08-04 | End: 2022-03-09 | Stop reason: SDUPTHER

## 2021-08-04 RX ORDER — SIMVASTATIN 20 MG
20 TABLET ORAL NIGHTLY
Qty: 90 TABLET | Refills: 1 | Status: SHIPPED
Start: 2021-08-04 | End: 2022-03-09 | Stop reason: SDUPTHER

## 2021-08-04 RX ORDER — ATENOLOL 25 MG/1
TABLET ORAL
Qty: 90 TABLET | Refills: 1 | Status: SHIPPED
Start: 2021-08-04 | End: 2022-03-09 | Stop reason: SDUPTHER

## 2021-08-04 NOTE — PROGRESS NOTES
19  Amanda Button : 1940 Sex: male  Age: [de-identified] y.o. No chief complaint on file. Patient has been more active and his hemoglobin A1c is now down to 6.5. Patient states that his total knee arthroplasty is working extremely well. He has been cutting both his grass and his son's grass. He is waiting for his wife to recover from her open heart surgery. Patient denies fever, chills, sweats. There is been no night sweats. He has no chest pain or anginal equivalents with exertion. Denies GI or  symptomatology. He is followed by urology. Colonoscopy was done in  showing diverticular disease but no polyps. The next exam would be in . Patient has received his Covid vaccination and other vaccines are up-to-date. Other    Hypertension    Hyperlipidemia        Review of Systems  Health Maintenance:  Influenza Vaccination - (10/20/2020)  Colonoscopy Screening - (2014)  Colonoscopy - (2009)  Colonoscopy - (2014)  Colonoscopy -  LORENA,  lorena diverticular dx next -SEE REPORT  Prostate Exam - () SCOLERI  Psa Test - ()  Physical Exam -  Rectal Exam - (2014)  Zoster Vaccine - ()  Pneumonia Vaccination - ()  Eye Exam - () VANCE- ADVISED REPEAT VISIT 17  Prevnar - (3/18/2015)  Urine Microalbumin - (19)  Dilated Eye Exam - (2020) ADVISED  Diabetic Foot Exam - (19)  COVID VACCINE 2021  1. Noninsulin requiring diabetes. 2. Hyperlipidemia. 3. Hypertension. 4. Osteoarthritis. SHANNON Arriaza 2020  5. CATARACT- VANCE DID SURGERY   6. KIDNEY INSUFFICIENCY   7. KIDNEY STONES--  8. [facility]- [facility]   9. CAROTID BRUIT-US ORDERED 19  10. LEFT GREATER TROCHANTERIC BUSITIS 19  11. PVD BY CT   Surgical Hx:  Cataract Removal -  VANCE  Transurethral Resection Of Prostate (TURP) - LIMBERT  Right total knee arthroplasty-2020  Reviewed and updated. FH:  Father:  . Procedure Laterality Date    CATARACT REMOVAL Bilateral 07/2011    Ayad Travis     No family history on file. Social History     Socioeconomic History    Marital status:      Spouse name: Not on file    Number of children: Not on file    Years of education: Not on file    Highest education level: Not on file   Occupational History    Not on file   Tobacco Use    Smoking status: Former Smoker     Packs/day: 1.00    Smokeless tobacco: Never Used   Substance and Sexual Activity    Alcohol use: Yes     Comment: Occassionally    Drug use: Not on file    Sexual activity: Not on file   Other Topics Concern    Not on file   Social History Narrative    Not on file     Social Determinants of Health     Financial Resource Strain:     Difficulty of Paying Living Expenses:    Food Insecurity:     Worried About Running Out of Food in the Last Year:     [address] in the Last Year:    Transportation Needs:     Lack of Transportation (Medical):  Lack of Transportation (Non-Medical):    Physical Activity:     Days of Exercise per Week:     Minutes of Exercise per Session:    Stress:     Feeling of Stress :    Social Connections:     Frequency of Communication with Friends and Family:     Frequency of Social Gatherings with Friends and Family:     Attends Mu-ism Services:     Active Member of Clubs or Organizations:     Attends Club or Organization Meetings:     Marital Status:    Intimate Partner Violence:     Fear of Current or Ex-Partner:     Emotionally Abused:     Physically Abused:     Sexually Abused: There were no vitals filed for this visit. Physical Exam  Objective  There were no vitals filed for this visit. Exam:  Const: Appears healthy, well developed and well nourished. Appears to weigh within normal range. Eyes: EOMI in both eyes. PERRL. ENMT: External ears WNL. Tympanic membranes are intact. External nose WNL. Neck: Supple and symmetric. Palpation reveals no adenopathy. No masses appreciated. Thyroid:  no nodule appreciated or thyromegaly. No jugular venous distention. Resp: Respirations are unlabored. Respiration rate is normal. Auscultate mildly decreased airflow. No rhonchorous sounds. CV: Rhythm is regular. S1 is normal. S2 is normal. Carotids with bruits bilaterally. Abdominal aorta:  not palpable. Pedal pulses: 2+ and equal bilaterally. Extremities: No clubbing, cyanosis or edema. Abdomen: Bowel sounds are normoactive. Palpation reveals softness, with no CVA tenderness,  distension, organomegaly, rebound tenderness or tenderness. Only reducible umbilical hernia. . No  palpable hepatosplenomegaly. Musculo: Walks with a normal gait. Upper Extremities: ROM: Discomfort, limitation, pain and pain on  resistance with movement of the left upper extremity. Lower Extremities: ROM: Physiologic range of motion of the lower extremity. Skin:   Neuro: Alert and oriented x3. Mood is normal. Affect is normal. Speech is articulate and fluent. DTR's are symmetric, intact and 2+ bilaterally, vibratory sense of the lower extremity is intact. Diagnoses and all orders for this visit:    Type 2 diabetes mellitus without complication, without long-term current use of insulin (HCC)  -     Comprehensive Metabolic Panel; Future  -     POCT glycosylated hemoglobin (Hb A1C)  -     metFORMIN (GLUCOPHAGE) 1000 MG tablet; Take 1 tablet by mouth 2 times daily (with meals)    Essential hypertension  -     Comprehensive Metabolic Panel; Future  -     POCT glycosylated hemoglobin (Hb A1C)  -     atenolol (TENORMIN) 25 MG tablet; TAKE ONE TABLET BY MOUTH DAILY    Primary osteoarthritis of both knees  -     Comprehensive Metabolic Panel; Future  -     POCT glycosylated hemoglobin (Hb A1C)    Mixed hyperlipidemia  -     gemfibrozil (LOPID) 600 MG tablet; TAKE ONE TABLET BY MOUTH TWO TIMES A DAY BEFORE MEALS  -     simvastatin (ZOCOR) 20 MG tablet;  Take 1 tablet by mouth nightly    Other orders  -     amLODIPine-benazepril (LOTREL) 5-10 MG per capsule; Take 1 capsule by mouth daily    Patient will have a CMP today. I did review his labs from the last time. There is no evidence of microalbuminuria. I will see him back in 6 months.

## 2022-02-02 DIAGNOSIS — E11.9 TYPE 2 DIABETES MELLITUS WITHOUT COMPLICATION, WITHOUT LONG-TERM CURRENT USE OF INSULIN (HCC): ICD-10-CM

## 2022-02-02 NOTE — TELEPHONE ENCOUNTER
Last Appointment:  8/4/2021  Future Appointments   Date Time Provider Robbin Patiño   2/4/2022  8:00 AM Sindi Toribio  [facility]

## 2022-03-09 ENCOUNTER — OFFICE VISIT (OUTPATIENT)
Dept: FAMILY MEDICINE CLINIC | Age: 82
End: 2022-03-09
Payer: MEDICARE

## 2022-03-09 VITALS
SYSTOLIC BLOOD PRESSURE: 130 MMHG | HEART RATE: 63 BPM | DIASTOLIC BLOOD PRESSURE: 72 MMHG | BODY MASS INDEX: 24.07 KG/M2 | TEMPERATURE: 97.8 F | OXYGEN SATURATION: 100 % | WEIGHT: 163 LBS

## 2022-03-09 DIAGNOSIS — E11.9 TYPE 2 DIABETES MELLITUS WITHOUT COMPLICATION, WITHOUT LONG-TERM CURRENT USE OF INSULIN (HCC): ICD-10-CM

## 2022-03-09 DIAGNOSIS — D50.9 IRON DEFICIENCY ANEMIA, UNSPECIFIED IRON DEFICIENCY ANEMIA TYPE: ICD-10-CM

## 2022-03-09 DIAGNOSIS — M17.0 PRIMARY OSTEOARTHRITIS OF BOTH KNEES: ICD-10-CM

## 2022-03-09 DIAGNOSIS — I10 ESSENTIAL HYPERTENSION: ICD-10-CM

## 2022-03-09 DIAGNOSIS — E11.9 TYPE 2 DIABETES MELLITUS WITHOUT COMPLICATION, WITHOUT LONG-TERM CURRENT USE OF INSULIN (HCC): Primary | ICD-10-CM

## 2022-03-09 DIAGNOSIS — E78.2 MIXED HYPERLIPIDEMIA: ICD-10-CM

## 2022-03-09 LAB
ALBUMIN SERPL-MCNC: 4.6 G/DL (ref 3.5–5.2)
ALP BLD-CCNC: 61 U/L (ref 40–129)
ALT SERPL-CCNC: 9 U/L (ref 0–40)
ANION GAP SERPL CALCULATED.3IONS-SCNC: 18 MMOL/L (ref 7–16)
AST SERPL-CCNC: 12 U/L (ref 0–39)
BASOPHILS ABSOLUTE: 0.03 E9/L (ref 0–0.2)
BASOPHILS RELATIVE PERCENT: 0.5 % (ref 0–2)
BILIRUB SERPL-MCNC: <0.2 MG/DL (ref 0–1.2)
BUN BLDV-MCNC: 31 MG/DL (ref 6–23)
CALCIUM SERPL-MCNC: 8.9 MG/DL (ref 8.6–10.2)
CHLORIDE BLD-SCNC: 105 MMOL/L (ref 98–107)
CHOLESTEROL, TOTAL: 168 MG/DL (ref 0–199)
CO2: 16 MMOL/L (ref 22–29)
CREAT SERPL-MCNC: 1.4 MG/DL (ref 0.7–1.2)
CREATININE URINE: 85 MG/DL (ref 40–278)
EOSINOPHILS ABSOLUTE: 0.89 E9/L (ref 0.05–0.5)
EOSINOPHILS RELATIVE PERCENT: 13.7 % (ref 0–6)
GFR AFRICAN AMERICAN: 59
GFR NON-AFRICAN AMERICAN: 49 ML/MIN/1.73
GLUCOSE BLD-MCNC: 133 MG/DL (ref 74–99)
HBA1C MFR BLD: 6.3 %
HCT VFR BLD CALC: 37.1 % (ref 37–54)
HDLC SERPL-MCNC: 39 MG/DL
HEMOGLOBIN: 11.6 G/DL (ref 12.5–16.5)
IMMATURE GRANULOCYTES #: 0.04 E9/L
IMMATURE GRANULOCYTES %: 0.6 % (ref 0–5)
IRON SATURATION: 21 % (ref 20–55)
IRON: 87 MCG/DL (ref 59–158)
LDL CHOLESTEROL CALCULATED: 105 MG/DL (ref 0–99)
LYMPHOCYTES ABSOLUTE: 2.6 E9/L (ref 1.5–4)
LYMPHOCYTES RELATIVE PERCENT: 40.1 % (ref 20–42)
MCH RBC QN AUTO: 28.2 PG (ref 26–35)
MCHC RBC AUTO-ENTMCNC: 31.3 % (ref 32–34.5)
MCV RBC AUTO: 90 FL (ref 80–99.9)
MICROALBUMIN UR-MCNC: 17.8 MG/L
MICROALBUMIN/CREAT UR-RTO: 20.9 (ref 0–30)
MONOCYTES ABSOLUTE: 0.49 E9/L (ref 0.1–0.95)
MONOCYTES RELATIVE PERCENT: 7.6 % (ref 2–12)
NEUTROPHILS ABSOLUTE: 2.43 E9/L (ref 1.8–7.3)
NEUTROPHILS RELATIVE PERCENT: 37.5 % (ref 43–80)
PDW BLD-RTO: 13.3 FL (ref 11.5–15)
PLATELET # BLD: 314 E9/L (ref 130–450)
PMV BLD AUTO: 11.3 FL (ref 7–12)
POTASSIUM SERPL-SCNC: 4.8 MMOL/L (ref 3.5–5)
RBC # BLD: 4.12 E12/L (ref 3.8–5.8)
SODIUM BLD-SCNC: 139 MMOL/L (ref 132–146)
TOTAL IRON BINDING CAPACITY: 421 MCG/DL (ref 250–450)
TOTAL PROTEIN: 7.2 G/DL (ref 6.4–8.3)
TRIGL SERPL-MCNC: 122 MG/DL (ref 0–149)
VLDLC SERPL CALC-MCNC: 24 MG/DL
WBC # BLD: 6.5 E9/L (ref 4.5–11.5)

## 2022-03-09 PROCEDURE — 83036 HEMOGLOBIN GLYCOSYLATED A1C: CPT | Performed by: INTERNAL MEDICINE

## 2022-03-09 PROCEDURE — 99214 OFFICE O/P EST MOD 30 MIN: CPT | Performed by: INTERNAL MEDICINE

## 2022-03-09 RX ORDER — AMLODIPINE BESYLATE AND BENAZEPRIL HYDROCHLORIDE 5; 10 MG/1; MG/1
1 CAPSULE ORAL DAILY
Qty: 90 CAPSULE | Refills: 3 | Status: SHIPPED
Start: 2022-03-09 | End: 2022-09-14 | Stop reason: SDUPTHER

## 2022-03-09 RX ORDER — SIMVASTATIN 20 MG
20 TABLET ORAL NIGHTLY
Qty: 90 TABLET | Refills: 1 | Status: SHIPPED
Start: 2022-03-09 | End: 2022-09-14 | Stop reason: SDUPTHER

## 2022-03-09 RX ORDER — GEMFIBROZIL 600 MG/1
TABLET, FILM COATED ORAL
Qty: 180 TABLET | Refills: 1 | Status: SHIPPED | OUTPATIENT
Start: 2022-03-09

## 2022-03-09 RX ORDER — ATENOLOL 25 MG/1
TABLET ORAL
Qty: 90 TABLET | Refills: 1 | Status: SHIPPED
Start: 2022-03-09 | End: 2022-09-14 | Stop reason: SDUPTHER

## 2022-03-09 ASSESSMENT — PATIENT HEALTH QUESTIONNAIRE - PHQ9
SUM OF ALL RESPONSES TO PHQ QUESTIONS 1-9: 0
SUM OF ALL RESPONSES TO PHQ QUESTIONS 1-9: 0
2. FEELING DOWN, DEPRESSED OR HOPELESS: 0
SUM OF ALL RESPONSES TO PHQ9 QUESTIONS 1 & 2: 0
1. LITTLE INTEREST OR PLEASURE IN DOING THINGS: 0
SUM OF ALL RESPONSES TO PHQ QUESTIONS 1-9: 0
SUM OF ALL RESPONSES TO PHQ QUESTIONS 1-9: 0

## 2022-03-09 NOTE — PROGRESS NOTES
19  Ellen Oakes : 1940 Sex: male  Age: 80 y.o. Chief Complaint   Patient presents with    Hyperlipidemia    Hypertension    Diabetes       Patient is doing well in terms of his performance status. He has been extremely active shoveling snow and plowing snow. He denies any cardiac or respiratory symptoms. He denies symptoms consistent with vascular claudication. He did have a CT done in 2019 indicating some iliac artery stenosis. He is totally asymptomatic from this. Blood sugars here are well controlled with hemoglobin A1c of 6.3. He denies any hypoglycemic reactions. Recent evaluated by urology. PSAs remained stable. BPH symptoms are stable. Blood pressure on repeat is very nicely controlled at 130/72. Other    Hypertension    Hyperlipidemia    Diabetes        Review of Systems  Health Maintenance:  Influenza Vaccination - (10/75803)  Colonoscopy Screening - (2014)  Colonoscopy - (2009)  Colonoscopy - (2014)  Colonoscopy -  LORENA,  lorena diverticular dx next , -SEE REPORT  Prostate Exam - () SCOLERI  Psa Test - ()  Physical Exam -3/9/2022  Rectal Exam - (2014)  Zoster Vaccine - ()  Pneumonia Vaccination - ()  Eye Exam - () VANCE- ADVISED REPEAT VISIT 17  Prevnar - (3/18/2015)  Urine Microalbumin - (19)  Dilated Eye Exam - (2020) ADVISED  Diabetic Foot Exam - (19)  COVID VACCINE 2/2021 x 3  1. Noninsulin requiring diabetes. 2. Hyperlipidemia. 3. Hypertension. 4. Osteoarthritis. SHANNON Arriaza 2020  5. CATARACT- VANCE DID SURGERY   6. KIDNEY INSUFFICIENCY   7. KIDNEY STONES--  8. [facility]   9. CAROTID BRUIT-US ORDERED 19  10. LEFT GREATER TROCHANTERIC BUSITIS 19  11. PVD BY CT   Surgical Hx:  Cataract Removal -  VANCE  Transurethral Resection Of Prostate (TURP) - LIMBERT  Right total knee arthroplasty-2020  Reviewed and updated. FH:  Father:  . (Hx)  Mother:  . (Hx)  Reviewed, no changes. SH: Patient is . Personal Habits: Former cigarette smoker, smoked 1 pack daily. Quit 2014. Drinks alcohol occasionally. Does  housework daily. Reviewed, no changes. Date: 8/4/2021  Was the patient queried about smoking behavior? Yes   Does the patient currently smoke? Smoking: Patient is a former smoker. Was the patient counseled about smoking cessation?  Yes       Current Outpatient Medications:     metFORMIN (GLUCOPHAGE) 1000 MG tablet, TAKE ONE TABLET BY MOUTH TWICE A DAY WITH MEALS, Disp: 180 tablet, Rfl: 1    gemfibrozil (LOPID) 600 MG tablet, TAKE ONE TABLET BY MOUTH TWO TIMES A DAY BEFORE MEALS, Disp: 180 tablet, Rfl: 1    amLODIPine-benazepril (LOTREL) 5-10 MG per capsule, Take 1 capsule by mouth daily, Disp: 90 capsule, Rfl: 3    atenolol (TENORMIN) 25 MG tablet, TAKE ONE TABLET BY MOUTH DAILY, Disp: 90 tablet, Rfl: 1    simvastatin (ZOCOR) 20 MG tablet, Take 1 tablet by mouth nightly, Disp: 90 tablet, Rfl: 1    aspirin 81 MG tablet, Take 81 mg by mouth daily, Disp: , Rfl:     magnesium gluconate (MAGONATE) 500 MG tablet, Take 500 mg by mouth daily, Disp: , Rfl:     tamsulosin (FLOMAX) 0.4 MG capsule, Take 1 capsule by mouth daily (Patient not taking: Reported on 8/4/2021), Disp: 90 capsule, Rfl: 1    Multiple Vitamins-Minerals (CENTRUM SILVER PO), Take by mouth daily, Disp: , Rfl:   Allergies   Allergen Reactions    Bactrim [Sulfamethoxazole-Trimethoprim]      DELONTE    Crestor [Rosuvastatin Calcium]     Lovastatin     Vytorin [Ezetimibe-Simvastatin]      Muscle pain       Past Medical History:   Diagnosis Date    Carotid bruit 01/25/2019    Cataract 07/2011    Nahed Riser did surgery    Greater trochanteric bursitis 2019    Hyperlipidemia     Hypertension     Kidney insufficiency 2016    Kidney stones 04/2017    Osteoarthritis     Margot Richey PVD (posterior vitreous detachment), bilateral     Type 2 diabetes mellitus without complication [facility]      Past Surgical History:   Procedure Laterality Date    CATARACT REMOVAL Bilateral 07/2011    Ana Rosa Travis     No family history on file. Social History     Socioeconomic History    Marital status:      Spouse name: Not on file    Number of children: Not on file    Years of education: Not on file    Highest education level: Not on file   Occupational History    Not on file   Tobacco Use    Smoking status: Former Smoker     Packs/day: 1.00    Smokeless tobacco: Never Used   Substance and Sexual Activity    Alcohol use: Yes     Comment: Occassionally    Drug use: Not on file    Sexual activity: Not on file   Other Topics Concern    Not on file   Social History Narrative    Not on file     Social Determinants of Health     Financial Resource Strain:     Difficulty of Paying Living Expenses: Not on file   Food Insecurity:     Worried About Running Out of Food in the Last Year: Not on file    Mechelle of Food in the Last Year: Not on file   Transportation Needs:     Lack of Transportation (Medical): Not on file    Lack of Transportation (Non-Medical):  Not on file   Physical Activity:     Days of Exercise per Week: Not on file    Minutes of Exercise per Session: Not on file   Stress:     Feeling of Stress : Not on file   Social Connections:     Frequency of Communication with Friends and Family: Not on file    Frequency of Social Gatherings with Friends and Family: Not on file    Attends Hoahaoism Services: Not on file    Active Member of Clubs or Organizations: Not on file    Attends Club or Organization Meetings: Not on file    Marital Status: Not on file   Intimate Partner Violence:     Fear of Current or Ex-Partner: Not on file    Emotionally Abused: Not on file    Physically Abused: Not on file    Sexually Abused: Not on file   Housing Stability:     Unable to Pay for Housing in the Last Year: Not on file    Number of [place] in the Last Year: Not on file    Unstable Housing in the Last Year: Not on file       Vitals:    03/09/22 1100   BP: (!) 150/70   Pulse: 63   Temp: 97.8 °F (36.6 °C)   SpO2: 100%   Weight: 163 lb (73.9 kg)       Physical Exam  Objective  Vitals:    03/09/22 1100   BP: (!) 150/70   Pulse: 63   Temp: 97.8 °F (36.6 °C)   SpO2: 100%     Exam:  Const: Appears healthy, well developed and well nourished. Appears to weigh within normal range. Eyes: EOMI in both eyes. PERRL. ENMT: External ears WNL. Tympanic membranes are intact. External nose WNL. Neck: Supple and symmetric. Palpation reveals no adenopathy. No masses appreciated. Thyroid:  no nodule appreciated or thyromegaly. No jugular venous distention. Resp: Respirations are unlabored. Respiration rate is normal. Auscultate mildly decreased airflow. No rhonchorous sounds. CV: Rhythm is regular. S1 is normal. S2 is normal. Carotids with bruits bilaterally. Abdominal aorta:  not palpable. Pedal pulses: 2+ and equal bilaterally. Extremities: No clubbing, cyanosis or edema. Abdomen: Bowel sounds are normoactive. Palpation reveals softness, with no CVA tenderness,  distension, organomegaly, rebound tenderness or tenderness. Easily  reducible umbilical hernia. . No  palpable hepatosplenomegaly. Femoral bruits more prominent on the right than the left. Musculo: Walks with a normal gait. Upper Extremities: ROM: Discomfort, limitation, pain and pain on  resistance with movement of the left upper extremity. Lower Extremities: ROM: Physiologic range of motion of the lower extremity. Skin:   Neuro: Alert and oriented x3. Mood is normal. Affect is normal. Speech is articulate and fluent. DTR's are symmetric, intact and 2+ bilaterally, vibratory sense of the lower extremity is intact. Bruce Lew was seen today for hyperlipidemia, hypertension and diabetes.     Diagnoses and all orders for this visit:    Type 2 diabetes mellitus without complication, without long-term current use of insulin ([facility]  -     POCT glycosylated hemoglobin (Hb A1C)  -     Microalbumin / Creatinine Urine Ratio; Future  -     Comprehensive Metabolic Panel; Future  -     Lipid Panel; Future    Mixed hyperlipidemia  -     gemfibrozil (LOPID) 600 MG tablet; TAKE ONE TABLET BY MOUTH TWO TIMES A DAY BEFORE MEALS  -     simvastatin (ZOCOR) 20 MG tablet; Take 1 tablet by mouth nightly    Essential hypertension  -     atenolol (TENORMIN) 25 MG tablet; TAKE ONE TABLET BY MOUTH DAILY    Primary osteoarthritis of both knees    Iron deficiency anemia, unspecified iron deficiency anemia type  -     CBC with Auto Differential; Future  -     Iron and TIBC; Future    Other orders  -     amLODIPine-benazepril (LOTREL) 5-10 MG per capsule; Take 1 capsule by mouth daily    Patient is not symptomatic from his peripheral vascular disease. Continue on current medications. The patient is to be seen back in 6 months. Lab work will be obtained today. Immunizations and screenings are up-to-date.

## 2022-03-10 DIAGNOSIS — E78.2 MIXED HYPERLIPIDEMIA: ICD-10-CM

## 2022-03-10 RX ORDER — SIMVASTATIN 20 MG
40 TABLET ORAL NIGHTLY
Qty: 90 TABLET | Refills: 1 | Status: CANCELLED | OUTPATIENT
Start: 2022-03-10

## 2022-08-01 DIAGNOSIS — E11.9 TYPE 2 DIABETES MELLITUS WITHOUT COMPLICATION, WITHOUT LONG-TERM CURRENT USE OF INSULIN (HCC): ICD-10-CM

## 2022-08-01 NOTE — TELEPHONE ENCOUNTER
Last Appointment:  3/9/2022  Future Appointments   Date Time Provider Robbin Patiño   9/14/2022 10:30 AM Justin Loera  [facility] [address]

## 2022-09-14 ENCOUNTER — OFFICE VISIT (OUTPATIENT)
Dept: FAMILY MEDICINE CLINIC | Age: 82
End: 2022-09-14
Payer: MEDICARE

## 2022-09-14 VITALS
OXYGEN SATURATION: 100 % | TEMPERATURE: 97.8 F | BODY MASS INDEX: 23.48 KG/M2 | WEIGHT: 159 LBS | SYSTOLIC BLOOD PRESSURE: 120 MMHG | HEART RATE: 59 BPM | DIASTOLIC BLOOD PRESSURE: 60 MMHG

## 2022-09-14 DIAGNOSIS — M17.0 PRIMARY OSTEOARTHRITIS OF BOTH KNEES: ICD-10-CM

## 2022-09-14 DIAGNOSIS — I10 ESSENTIAL HYPERTENSION: ICD-10-CM

## 2022-09-14 DIAGNOSIS — M70.62 TROCHANTERIC BURSITIS OF LEFT HIP: ICD-10-CM

## 2022-09-14 DIAGNOSIS — N40.1 BENIGN PROSTATIC HYPERPLASIA WITH INCOMPLETE BLADDER EMPTYING: ICD-10-CM

## 2022-09-14 DIAGNOSIS — R39.14 BENIGN PROSTATIC HYPERPLASIA WITH INCOMPLETE BLADDER EMPTYING: ICD-10-CM

## 2022-09-14 DIAGNOSIS — E78.2 MIXED HYPERLIPIDEMIA: ICD-10-CM

## 2022-09-14 DIAGNOSIS — N18.31 STAGE 3A CHRONIC KIDNEY DISEASE (HCC): ICD-10-CM

## 2022-09-14 DIAGNOSIS — M70.62 TROCHANTERIC BURSITIS OF LEFT HIP: Primary | ICD-10-CM

## 2022-09-14 DIAGNOSIS — E11.9 TYPE 2 DIABETES MELLITUS WITHOUT COMPLICATION, WITHOUT LONG-TERM CURRENT USE OF INSULIN (HCC): ICD-10-CM

## 2022-09-14 PROBLEM — N18.30 CHRONIC RENAL DISEASE, STAGE III (HCC): Status: ACTIVE | Noted: 2022-09-14

## 2022-09-14 LAB
ALBUMIN SERPL-MCNC: 5 G/DL (ref 3.5–5.2)
ALP BLD-CCNC: 65 U/L (ref 40–129)
ALT SERPL-CCNC: 9 U/L (ref 0–40)
ANION GAP SERPL CALCULATED.3IONS-SCNC: 14 MMOL/L (ref 7–16)
AST SERPL-CCNC: 12 U/L (ref 0–39)
BASOPHILS ABSOLUTE: 0.03 E9/L (ref 0–0.2)
BASOPHILS RELATIVE PERCENT: 0.5 % (ref 0–2)
BILIRUB SERPL-MCNC: <0.2 MG/DL (ref 0–1.2)
BILIRUBIN URINE: NEGATIVE
BLOOD, URINE: NEGATIVE
BUN BLDV-MCNC: 25 MG/DL (ref 6–23)
CALCIUM SERPL-MCNC: 9.2 MG/DL (ref 8.6–10.2)
CHLORIDE BLD-SCNC: 108 MMOL/L (ref 98–107)
CLARITY: CLEAR
CO2: 18 MMOL/L (ref 22–29)
COLOR: YELLOW
CREAT SERPL-MCNC: 1.4 MG/DL (ref 0.7–1.2)
EOSINOPHILS ABSOLUTE: 1.08 E9/L (ref 0.05–0.5)
EOSINOPHILS RELATIVE PERCENT: 16.5 % (ref 0–6)
GFR AFRICAN AMERICAN: 59
GFR NON-AFRICAN AMERICAN: 49 ML/MIN/1.73
GLUCOSE BLD-MCNC: 88 MG/DL (ref 74–99)
GLUCOSE URINE: NEGATIVE MG/DL
HCT VFR BLD CALC: 39.4 % (ref 37–54)
HEMOGLOBIN: 12 G/DL (ref 12.5–16.5)
IMMATURE GRANULOCYTES #: 0.04 E9/L
IMMATURE GRANULOCYTES %: 0.6 % (ref 0–5)
KETONES, URINE: NEGATIVE MG/DL
LEUKOCYTE ESTERASE, URINE: ABNORMAL
LYMPHOCYTES ABSOLUTE: 2.37 E9/L (ref 1.5–4)
LYMPHOCYTES RELATIVE PERCENT: 36.2 % (ref 20–42)
MCH RBC QN AUTO: 28.8 PG (ref 26–35)
MCHC RBC AUTO-ENTMCNC: 30.5 % (ref 32–34.5)
MCV RBC AUTO: 94.7 FL (ref 80–99.9)
MONOCYTES ABSOLUTE: 0.39 E9/L (ref 0.1–0.95)
MONOCYTES RELATIVE PERCENT: 6 % (ref 2–12)
NEUTROPHILS ABSOLUTE: 2.64 E9/L (ref 1.8–7.3)
NEUTROPHILS RELATIVE PERCENT: 40.2 % (ref 43–80)
NITRITE, URINE: NEGATIVE
PDW BLD-RTO: 13.2 FL (ref 11.5–15)
PH UA: 6 (ref 5–9)
PLATELET # BLD: 299 E9/L (ref 130–450)
PMV BLD AUTO: 11.5 FL (ref 7–12)
POTASSIUM SERPL-SCNC: 5.8 MMOL/L (ref 3.5–5)
PROTEIN UA: NEGATIVE MG/DL
RBC # BLD: 4.16 E12/L (ref 3.8–5.8)
SODIUM BLD-SCNC: 140 MMOL/L (ref 132–146)
SPECIFIC GRAVITY UA: 1.01 (ref 1–1.03)
TOTAL PROTEIN: 7.3 G/DL (ref 6.4–8.3)
UROBILINOGEN, URINE: 0.2 E.U./DL
WBC # BLD: 6.6 E9/L (ref 4.5–11.5)

## 2022-09-14 PROCEDURE — 1123F ACP DISCUSS/DSCN MKR DOCD: CPT | Performed by: INTERNAL MEDICINE

## 2022-09-14 PROCEDURE — 3044F HG A1C LEVEL LT 7.0%: CPT | Performed by: INTERNAL MEDICINE

## 2022-09-14 PROCEDURE — 99214 OFFICE O/P EST MOD 30 MIN: CPT | Performed by: INTERNAL MEDICINE

## 2022-09-14 PROCEDURE — 81003 URINALYSIS AUTO W/O SCOPE: CPT | Performed by: INTERNAL MEDICINE

## 2022-09-14 RX ORDER — ATENOLOL 25 MG/1
TABLET ORAL
Qty: 90 TABLET | Refills: 1 | Status: SHIPPED | OUTPATIENT
Start: 2022-09-14

## 2022-09-14 RX ORDER — AMLODIPINE BESYLATE AND BENAZEPRIL HYDROCHLORIDE 5; 10 MG/1; MG/1
1 CAPSULE ORAL DAILY
Qty: 90 CAPSULE | Refills: 3 | Status: SHIPPED
Start: 2022-09-14 | End: 2022-09-16 | Stop reason: ALTCHOICE

## 2022-09-14 RX ORDER — PREDNISONE 20 MG/1
40 TABLET ORAL DAILY
Qty: 20 TABLET | Refills: 0 | Status: SHIPPED | OUTPATIENT
Start: 2022-09-14 | End: 2022-09-24

## 2022-09-14 RX ORDER — TAMSULOSIN HYDROCHLORIDE 0.4 MG/1
0.4 CAPSULE ORAL DAILY
Qty: 90 CAPSULE | Refills: 1 | Status: SHIPPED | OUTPATIENT
Start: 2022-09-14

## 2022-09-14 RX ORDER — SIMVASTATIN 20 MG
20 TABLET ORAL NIGHTLY
Qty: 90 TABLET | Refills: 1 | Status: SHIPPED | OUTPATIENT
Start: 2022-09-14

## 2022-09-14 NOTE — PROGRESS NOTES
19  Praneeth Rivero : 1940 Sex: male  Age: 80 y.o. No chief complaint on file. She is having problems feeling like he can empty his bladder. He is followed by urology. Patient went off of his Flomax sometimes over the last year and I believe this is the etiology of his symptoms. The patient is also complaining of left hip discomfort. He was evaluated by Dr. Federico Dutta who felt this was coming from his back. Patient is not having radiation of pain down into his leg. He denies any groin pain. This seems to be slightly worse with movement. He does have arthritis of his knees. He said 1 previous knee replacement. Patient also has a fair amount of arthritis of his hands. We did discuss use of Voltaren for this. Hyperlipidemia    Hypertension    Diabetes    Other      Review of Systems  Health Maintenance:  Influenza Vaccination - (10/92827)  Colonoscopy Screening - (2014)  Colonoscopy - (2009)  Colonoscopy - (2014)  Colonoscopy -  LORENA,  lorena diverticular dx next -SEE REPORT  Prostate Exam - ()SCOLERI  Psa Test - ()  Physical Exam -3/9/2022  Rectal Exam - (2014)  Zoster Vaccine - ()  Pneumonia Vaccination - ()  Eye Exam - () VANCE- ADVISED REPEAT VISIT 17  Prevnar - (3/18/2015)  Urine Microalbumin - (19)  Dilated Eye Exam - (2020) ADVISED  Diabetic Foot Exam - (19)  COVID VACCINE 2/2021 x 3  1. Noninsulin requiring diabetes. 2. Hyperlipidemia. 3. Hypertension. 4. Osteoarthritis. SHANNON Arriaza 2020  5. CATARACT- VANCE DID SURGERY   6. KIDNEY INSUFFICIENCY   7. KIDNEY STONES--  8. [facility]- [facility]   9. CAROTID BRUIT-US ORDERED 19  10. LEFT GREATER TROCHANTERIC BUSITIS 19  11. PVD BY CT   12.  Left greater trochanteric bursitis 2022  Surgical Hx:  Cataract Removal -  VANCE  Transurethral Resection Of Prostate (TURP) - [facility]  Right total knee arthroplasty-9/23/2020  Reviewed and updated. FH:  Father:  . (Hx)  Mother:  . (Hx)  Reviewed, no changes. SH: Patient is . Personal Habits: Former cigarette smoker, smoked 1 pack daily. Quit 2014. Drinks alcohol occasionally. Does  housework daily. Reviewed, no changes. Date 9/14/2022  Was the patient queried about smoking behavior? Yes   Does the patient currently smoke? Smoking: Patient is a former smoker. Was the patient counseled about smoking cessation?  Yes       Current Outpatient Medications:     metFORMIN (GLUCOPHAGE) 1000 MG tablet, TAKE ONE TABLET BY MOUTH TWO TIMES A DAY WITH MEALS, Disp: 180 tablet, Rfl: 0    gemfibrozil (LOPID) 600 MG tablet, TAKE ONE TABLET BY MOUTH TWO TIMES A DAY BEFORE MEALS, Disp: 180 tablet, Rfl: 1    amLODIPine-benazepril (LOTREL) 5-10 MG per capsule, Take 1 capsule by mouth daily, Disp: 90 capsule, Rfl: 3    atenolol (TENORMIN) 25 MG tablet, TAKE ONE TABLET BY MOUTH DAILY, Disp: 90 tablet, Rfl: 1    simvastatin (ZOCOR) 20 MG tablet, Take 1 tablet by mouth nightly, Disp: 90 tablet, Rfl: 1    tamsulosin (FLOMAX) 0.4 MG capsule, Take 1 capsule by mouth daily (Patient not taking: Reported on 8/4/2021), Disp: 90 capsule, Rfl: 1    Multiple Vitamins-Minerals (CENTRUM SILVER PO), Take by mouth daily, Disp: , Rfl:     aspirin 81 MG tablet, Take 81 mg by mouth daily, Disp: , Rfl:     magnesium gluconate (MAGONATE) 500 MG tablet, Take 500 mg by mouth daily, Disp: , Rfl:   Allergies   Allergen Reactions    Bactrim [Sulfamethoxazole-Trimethoprim]      DELONTE    Crestor [Rosuvastatin Calcium]     Lovastatin     Vytorin [Ezetimibe-Simvastatin]      Muscle pain       Past Medical History:   Diagnosis Date    Carotid bruit 01/25/2019    Cataract 07/2011    [facility] [place] did surgery    Greater trochanteric bursitis 2019    Hyperlipidemia     Hypertension     Kidney insufficiency 2016    Kidney stones 04/2017    Osteoarthritis     [place]    PVD (posterior vitreous detachment), bilateral     Type 2 diabetes mellitus without complication [facility])      Past Surgical History:   Procedure Laterality Date    CATARACT REMOVAL Bilateral 07/2011    Mick Valentine    TRANSURETHRAL RESECTION OF PROSTATE      [place]     No family history on file. Social History     Socioeconomic History    Marital status:      Spouse name: Not on file    Number of children: Not on file    Years of education: Not on file    Highest education level: Not on file   Occupational History    Not on file   Tobacco Use    Smoking status: Former     Packs/day: 1.00     Types: Cigarettes    Smokeless tobacco: Never   Substance and Sexual Activity    Alcohol use: Yes     Comment: Occassionally    Drug use: Not on file    Sexual activity: Not on file   Other Topics Concern    Not on file   Social History Narrative    Not on file     Social Determinants of Health     Financial Resource Strain: Not on file   Food Insecurity: Not on file   Transportation Needs: Not on file   Physical Activity: Not on file   Stress: Not on file   Social Connections: Not on file   Intimate Partner Violence: Not on file   Housing Stability: Not on file       Vitals:    09/14/22 1038   BP: 120/60   Pulse: 59   Temp: 97.8 °F (36.6 °C)   SpO2: 100%   Weight: 159 lb (72.1 kg)       Physical Exam  Objective  Vitals:    09/14/22 1038   BP: 120/60   Pulse: 59   Temp: 97.8 °F (36.6 °C)   SpO2: 100%     Exam:  Const: Appears healthy, well developed and well nourished. Appears to weigh within normal range. Eyes: EOMI in both eyes. PERRL. ENMT: External ears WNL. Tympanic membranes are intact. External nose WNL. Neck: Supple and symmetric. Palpation reveals no adenopathy. No masses appreciated. Thyroid:  no nodule appreciated or thyromegaly. No jugular venous distention. Resp: Respirations are unlabored. Respiration rate is normal. Auscultate mildly decreased airflow. No rhonchorous sounds. CV: Rhythm is regular. S1 is normal. S2 is normal. Carotids with bruits bilaterally. Abdominal aorta:  not palpable. Pedal pulses: 2+ and equal bilaterally. Extremities: No clubbing, cyanosis or edema. Abdomen: Bowel sounds are normoactive. Palpation reveals softness, with no CVA tenderness,  distension, organomegaly, rebound tenderness or tenderness. Easily  reducible umbilical hernia. . No  palpable hepatosplenomegaly. Femoral bruits more prominent on the right than the left. Musculo: Walks with a limping gait. Upper Extremities: ROM: Discomfort, limitation, pain and pain on  resistance with movement of the left upper extremity. Lower Extremities: ROM: Limited range of motion with flexion extension inversion eversion of both hips. Pain over the greater trochanteric bursa with deep palpation over the left hip. Skin:   Neuro: Alert and oriented x3. Mood is normal. Affect is normal. Speech is articulate and fluent. DTR's are symmetric, intact and 2+ bilaterally, vibratory sense of the lower extremity is intact. Diagnoses and all orders for this visit:    Trochanteric bursitis of left hip  -     Urinalysis; Future  -     Comprehensive Metabolic Panel; Future  -     CBC with Auto Differential; Future  -     CBC with Auto Differential; Future    Essential hypertension  -     atenolol (TENORMIN) 25 MG tablet; TAKE ONE TABLET BY MOUTH DAILY  -     Urinalysis; Future  -     Comprehensive Metabolic Panel; Future  -     CBC with Auto Differential; Future  -     CBC with Auto Differential; Future    Mixed hyperlipidemia  -     simvastatin (ZOCOR) 20 MG tablet; Take 1 tablet by mouth nightly  -     Urinalysis; Future  -     Comprehensive Metabolic Panel; Future  -     CBC with Auto Differential; Future  -     CBC with Auto Differential; Future    Type 2 diabetes mellitus without complication, without long-term current use of insulin (HCC)  -     metFORMIN (GLUCOPHAGE) 1000 MG tablet; TAKE ONE TABLET BY MOUTH TWO TIMES A DAY WITH MEALS  -     Urinalysis;  Future  -     Comprehensive Metabolic Panel; Future  -     CBC with Auto Differential; Future  -     CBC with Auto Differential; Future  -     Hemoglobin A1C; Future    Primary osteoarthritis of both knees  -     Urinalysis; Future  -     Comprehensive Metabolic Panel; Future  -     CBC with Auto Differential; Future  -     CBC with Auto Differential; Future    Benign prostatic hyperplasia with incomplete bladder emptying  -     Urinalysis; Future  -     Comprehensive Metabolic Panel; Future  -     CBC with Auto Differential; Future  -     CBC with Auto Differential; Future    Stage 3a chronic kidney disease ([facility] [address]    Other orders  -     amLODIPine-benazepril (LOTREL) 5-10 MG per capsule; Take 1 capsule by mouth daily  -     predniSONE (DELTASONE) 20 MG tablet; Take 2 tablets by mouth daily for 10 days  -     tamsulosin (FLOMAX) 0.4 MG capsule; Take 1 capsule by mouth daily  Patient is placed on a short course of steroid and he is to ice the affected hip. If this is not improving he may benefit from injection therapy. We will restart Flomax. He does have a follow-up appointment with urology in about a month. Blood pressure here is well controlled. I have warned him about possible side effects of lowering blood pressure when he takes Flomax.   I will see the patient back in 4 months but interim visit in 3 weeks

## 2022-09-15 DIAGNOSIS — N18.31 STAGE 3A CHRONIC KIDNEY DISEASE (HCC): ICD-10-CM

## 2022-09-15 DIAGNOSIS — E87.5 HYPERKALEMIA: Primary | ICD-10-CM

## 2022-09-15 LAB
ANION GAP SERPL CALCULATED.3IONS-SCNC: 9 MEQ/L (ref 3–11)
BACTERIA: NORMAL /HPF
BUN BLDV-MCNC: 30 MG/DL (ref 6–20)
CALCIUM SERPL-MCNC: 9.1 MG/DL (ref 8.5–10.5)
CHLORIDE BLD-SCNC: 108 MEQ/L (ref 98–107)
CO2: 20 MEQ/L (ref 21–31)
CREAT SERPL-MCNC: 1.7 MG/DL (ref 0.6–1.3)
CREATININE + EGFR PANEL: 47 ML/MIN
GFR NON-AFRICAN AMERICAN: 39 ML/MIN
GLUCOSE BLD-MCNC: 189 MG/DL (ref 70–99)
HBA1C MFR BLD: 6 % (ref 4–5.6)
POTASSIUM SERPL-SCNC: 5.3 MEQ/L (ref 3.6–5)
RBC UA: NORMAL /HPF (ref 0–2)
SODIUM BLD-SCNC: 137 MEQ/L (ref 135–145)
WBC UA: NORMAL /HPF (ref 0–5)

## 2022-09-16 DIAGNOSIS — N17.9 AKI (ACUTE KIDNEY INJURY) (HCC): ICD-10-CM

## 2022-09-16 DIAGNOSIS — I10 ESSENTIAL HYPERTENSION: Primary | ICD-10-CM

## 2022-09-16 RX ORDER — AMLODIPINE BESYLATE 5 MG/1
5 TABLET ORAL DAILY
Qty: 30 TABLET | Refills: 0 | Status: SHIPPED
Start: 2022-09-16 | End: 2022-10-13 | Stop reason: SDUPTHER

## 2022-09-26 DIAGNOSIS — N17.9 AKI (ACUTE KIDNEY INJURY) (HCC): ICD-10-CM

## 2022-09-26 DIAGNOSIS — I10 ESSENTIAL HYPERTENSION: ICD-10-CM

## 2022-09-26 LAB
ANION GAP SERPL CALCULATED.3IONS-SCNC: 11 MMOL/L (ref 7–16)
BUN BLDV-MCNC: 25 MG/DL (ref 6–23)
CALCIUM SERPL-MCNC: 9.1 MG/DL (ref 8.6–10.2)
CHLORIDE BLD-SCNC: 102 MMOL/L (ref 98–107)
CO2: 24 MMOL/L (ref 22–29)
CREAT SERPL-MCNC: 1.5 MG/DL (ref 0.7–1.2)
GFR AFRICAN AMERICAN: 54
GFR NON-AFRICAN AMERICAN: 45 ML/MIN/1.73
GLUCOSE BLD-MCNC: 112 MG/DL (ref 74–99)
POTASSIUM SERPL-SCNC: 5.5 MMOL/L (ref 3.5–5)
SODIUM BLD-SCNC: 137 MMOL/L (ref 132–146)

## 2022-09-26 PROCEDURE — 81003 URINALYSIS AUTO W/O SCOPE: CPT | Performed by: INTERNAL MEDICINE

## 2022-09-27 DIAGNOSIS — I10 ESSENTIAL HYPERTENSION: Primary | ICD-10-CM

## 2022-09-27 DIAGNOSIS — N17.9 AKI (ACUTE KIDNEY INJURY) (HCC): ICD-10-CM

## 2022-10-13 RX ORDER — AMLODIPINE BESYLATE 5 MG/1
5 TABLET ORAL DAILY
Qty: 90 TABLET | Refills: 1 | Status: SHIPPED | OUTPATIENT
Start: 2022-10-13

## 2022-10-13 NOTE — TELEPHONE ENCOUNTER
Amlodipine refill is needed. Will cancel refill on Lotrel since it was discontinued. Wife would like a 80 day Rx.      Thanks

## 2022-12-02 DIAGNOSIS — E78.2 MIXED HYPERLIPIDEMIA: ICD-10-CM

## 2022-12-06 RX ORDER — GEMFIBROZIL 600 MG/1
TABLET, FILM COATED ORAL
Qty: 180 TABLET | Refills: 0 | Status: SHIPPED | OUTPATIENT
Start: 2022-12-06

## 2022-12-06 NOTE — TELEPHONE ENCOUNTER
Last Appointment:  9/14/2022  Future Appointments   Date Time Provider Robbin Patiño   1/18/2023  9:30 AM Sophia Garcia  [address]

## 2023-01-18 ENCOUNTER — OFFICE VISIT (OUTPATIENT)
Dept: FAMILY MEDICINE CLINIC | Age: 83
End: 2023-01-18

## 2023-01-18 VITALS
SYSTOLIC BLOOD PRESSURE: 140 MMHG | DIASTOLIC BLOOD PRESSURE: 70 MMHG | HEART RATE: 63 BPM | TEMPERATURE: 97.3 F | BODY MASS INDEX: 24.96 KG/M2 | WEIGHT: 169 LBS | OXYGEN SATURATION: 97 %

## 2023-01-18 DIAGNOSIS — N18.31 STAGE 3A CHRONIC KIDNEY DISEASE (HCC): ICD-10-CM

## 2023-01-18 DIAGNOSIS — E78.2 MIXED HYPERLIPIDEMIA: ICD-10-CM

## 2023-01-18 DIAGNOSIS — M70.60 GREATER TROCHANTERIC BURSITIS, UNSPECIFIED LATERALITY: ICD-10-CM

## 2023-01-18 DIAGNOSIS — E11.9 TYPE 2 DIABETES MELLITUS WITHOUT COMPLICATION, WITHOUT LONG-TERM CURRENT USE OF INSULIN (HCC): Primary | ICD-10-CM

## 2023-01-18 DIAGNOSIS — I10 ESSENTIAL HYPERTENSION: ICD-10-CM

## 2023-01-18 LAB — HBA1C MFR BLD: 6.5 %

## 2023-01-18 RX ORDER — TAMSULOSIN HYDROCHLORIDE 0.4 MG/1
0.4 CAPSULE ORAL DAILY
Qty: 90 CAPSULE | Refills: 1 | Status: SHIPPED | OUTPATIENT
Start: 2023-01-18

## 2023-01-18 RX ORDER — METHYLPREDNISOLONE ACETATE 40 MG/ML
40 INJECTION, SUSPENSION INTRA-ARTICULAR; INTRALESIONAL; INTRAMUSCULAR; SOFT TISSUE ONCE
Status: COMPLETED | OUTPATIENT
Start: 2023-01-18 | End: 2023-01-18

## 2023-01-18 RX ORDER — ATENOLOL 25 MG/1
TABLET ORAL
Qty: 90 TABLET | Refills: 1 | Status: SHIPPED | OUTPATIENT
Start: 2023-01-18

## 2023-01-18 RX ORDER — SIMVASTATIN 20 MG
20 TABLET ORAL NIGHTLY
Qty: 90 TABLET | Refills: 1 | Status: SHIPPED | OUTPATIENT
Start: 2023-01-18

## 2023-01-18 RX ORDER — GEMFIBROZIL 600 MG/1
TABLET, FILM COATED ORAL
Qty: 180 TABLET | Refills: 1 | Status: SHIPPED | OUTPATIENT
Start: 2023-01-18

## 2023-01-18 RX ADMIN — METHYLPREDNISOLONE ACETATE 40 MG: 40 INJECTION, SUSPENSION INTRA-ARTICULAR; INTRALESIONAL; INTRAMUSCULAR; SOFT TISSUE at 17:03

## 2023-01-18 RX ADMIN — METHYLPREDNISOLONE ACETATE 40 MG: 40 INJECTION, SUSPENSION INTRA-ARTICULAR; INTRALESIONAL; INTRAMUSCULAR; SOFT TISSUE at 17:04

## 2023-01-18 NOTE — PROGRESS NOTES
19  Kalyani Penn : 1940 Sex: male  Age: 80 y.o. No chief complaint on file. This patient is done much better in terms of his BPH symptomatology with the reintroduction of Flomax. No significant problems there. Patient does follow with urology. He is complaining of bilateral greater trochanteric bursitis. He was evaluated by Dr. Alyce Wood in the spring who felt that he did not have significant osteoarthritis of the hip. Patient just recently had a cold. Symptoms are getting better. He still has some residual cough. No systemic symptoms including fever, chills, sweats. His hemoglobin A1c is well controlled at 6.5. Initial the prednisone did help but obviously cannot be on that for long periods. Patient does not ice the hip and is instructed to do so. Hyperlipidemia    Hypertension    Diabetes    Other      Review of Systems  Health Maintenance:  Influenza Vaccination - (10/02574)  Colonoscopy Screening - (2014)  Colonoscopy - (2009)  Colonoscopy - (2014)  Colonoscopy -  LORENA,  lorena diverticular dx next -SEE REPORT  Prostate Exam - ()SCOLERI  Psa Test - ()  Physical Exam -3/9/2022  Rectal Exam - (2014)  Zoster Vaccine - ()  Pneumonia Vaccination - ()  Eye Exam - () VANCE- ADVISED REPEAT VISIT 17  Prevnar - (3/18/2015)  Urine Microalbumin - (19)  Dilated Eye Exam - (2020) ADVISED  Diabetic Foot Exam - (19)  COVID VACCINE 2/2021 x 3  1. Noninsulin requiring diabetes. 2. Hyperlipidemia. 3. Hypertension. 4. Osteoarthritis. SHANNON Arriaza 2020  5. CATARACT- VANCE DID SURGERY   6. KIDNEY INSUFFICIENCY   7. KIDNEY STONES--  8. [facility]- [facility]   9. CAROTID BRUIT-US ORDERED 19  10. LEFT GREATER TROCHANTERIC BUSITIS 19  11. PVD BY CT   12.  Left greater trochanteric bursitis 2022/ 2022 injection therapy  Surgical Hx:  Cataract Removal -  VANCE  Transurethral Resection Of Prostate (TURP) - [place]  Right total knee arthroplasty-9/23/2020  Reviewed and updated. FH:  Father:  . (Hx)  Mother:  . (Hx)  Reviewed, no changes. SH: Patient is . Personal Habits: Former cigarette smoker, smoked 1 pack daily. Quit 2014. Drinks alcohol occasionally. Does  housework daily. Reviewed, no changes. Date 1/18/2022  Was the patient queried about smoking behavior? Yes   Does the patient currently smoke? Smoking: Patient is a former smoker. Was the patient counseled about smoking cessation?  Yes       Current Outpatient Medications:     gemfibrozil (LOPID) 600 MG tablet, TAKE ONE TABLET BY MOUTH TWO TIMES A DAY BEFORE MEALS, Disp: 180 tablet, Rfl: 0    amLODIPine (NORVASC) 5 MG tablet, Take 1 tablet by mouth daily, Disp: 90 tablet, Rfl: 1    atenolol (TENORMIN) 25 MG tablet, TAKE ONE TABLET BY MOUTH DAILY, Disp: 90 tablet, Rfl: 1    simvastatin (ZOCOR) 20 MG tablet, Take 1 tablet by mouth nightly, Disp: 90 tablet, Rfl: 1    metFORMIN (GLUCOPHAGE) 1000 MG tablet, TAKE ONE TABLET BY MOUTH TWO TIMES A DAY WITH MEALS, Disp: 180 tablet, Rfl: 1    tamsulosin (FLOMAX) 0.4 MG capsule, Take 1 capsule by mouth daily, Disp: 90 capsule, Rfl: 1    Multiple Vitamins-Minerals (CENTRUM SILVER PO), Take by mouth daily, Disp: , Rfl:     aspirin 81 MG tablet, Take 81 mg by mouth daily, Disp: , Rfl:     magnesium gluconate (MAGONATE) 500 MG tablet, Take 500 mg by mouth daily, Disp: , Rfl:   Allergies   Allergen Reactions    Bactrim [Sulfamethoxazole-Trimethoprim]      DELONTE    Crestor [Rosuvastatin Calcium]     Lovastatin     Vytorin [Ezetimibe-Simvastatin]      Muscle pain       Past Medical History:   Diagnosis Date    Carotid bruit 01/25/2019    Cataract 07/2011    Jesús Medel did surgery    Greater trochanteric bursitis 2019    Hyperlipidemia     Hypertension     Kidney insufficiency 2016    Kidney stones 04/2017    Osteoarthritis     [place]    PVD (posterior vitreous detachment), bilateral     Type 2 diabetes mellitus without complication [facility])      Past Surgical History:   Procedure Laterality Date    CATARACT REMOVAL Bilateral 07/2011    Radha Herr    TRANSURETHRAL RESECTION OF PROSTATE      [place]     No family history on file. Social History     Socioeconomic History    Marital status:      Spouse name: Not on file    Number of children: Not on file    Years of education: Not on file    Highest education level: Not on file   Occupational History    Not on file   Tobacco Use    Smoking status: Former     Packs/day: 1.00     Types: Cigarettes    Smokeless tobacco: Never   Substance and Sexual Activity    Alcohol use: Yes     Comment: Occassionally    Drug use: Not on file    Sexual activity: Not on file   Other Topics Concern    Not on file   Social History Narrative    Not on file     Social Determinants of Health     Financial Resource Strain: Not on file   Food Insecurity: Not on file   Transportation Needs: Not on file   Physical Activity: Not on file   Stress: Not on file   Social Connections: Not on file   Intimate Partner Violence: Not on file   Housing Stability: Not on file       Vitals:    01/18/23 0922   BP: (!) 140/70   Pulse: 63   Temp: 97.3 °F (36.3 °C)   SpO2: 97%   Weight: 169 lb (76.7 kg)       Physical Exam  Objective  Vitals:    01/18/23 0922   BP: (!) 140/70   Pulse: 63   Temp: 97.3 °F (36.3 °C)   SpO2: 97%     Exam:  Const: Appears healthy, well developed and well nourished. Appears to weigh within normal range. Eyes: EOMI in both eyes. PERRL. ENMT: External ears WNL. Tympanic membranes are intact. External nose WNL. Neck: Supple and symmetric. Palpation reveals no adenopathy. No masses appreciated. Thyroid:  no nodule appreciated or thyromegaly. No jugular venous distention. Resp: Respirations are unlabored. Respiration rate is normal. Auscultate mildly decreased airflow. No rhonchorous sounds. CV: Rhythm is regular. S1 is normal. S2 is normal. Carotids with bruits bilaterally. Abdominal aorta:  not palpable. Pedal pulses: 2+ and equal bilaterally. Extremities: No clubbing, cyanosis or edema. Abdomen: Bowel sounds are normoactive. Palpation reveals softness, with no CVA tenderness,  distension, organomegaly, rebound tenderness or tenderness. Easily  reducible umbilical hernia. . No  palpable hepatosplenomegaly. Femoral bruits more prominent on the right than the left. Musculo: Walks with a limping gait. Upper Extremities: ROM: Discomfort, limitation, pain and pain on  resistance with movement of the left upper extremity. Lower Extremities: ROM: Limited range of motion with flexion extension inversion eversion of both hips. Pain over the greater trochanteric bursa with deep palpation over the left hip. Skin:   Neuro: Alert and oriented x3. Mood is normal. Affect is normal. Speech is articulate and fluent. DTR's are symmetric, intact and 2+ bilaterally, vibratory sense of the lower extremity is intact. Diagnoses and all orders for this visit:    Type 2 diabetes mellitus without complication, without long-term current use of insulin ([facility])  -     POCT glycosylated hemoglobin (Hb A1C)  -     metFORMIN (GLUCOPHAGE) 1000 MG tablet; TAKE ONE TABLET BY MOUTH TWO TIMES A DAY WITH MEALS  -     Comprehensive Metabolic Panel; Future  -     Lipid Panel; Future  -     Microalbumin / Creatinine Urine Ratio; Future    Mixed hyperlipidemia  -     gemfibrozil (LOPID) 600 MG tablet; TAKE ONE TABLET BY MOUTH TWO TIMES A DAY BEFORE MEALS  -     simvastatin (ZOCOR) 20 MG tablet; Take 1 tablet by mouth nightly  -     Comprehensive Metabolic Panel; Future  -     Lipid Panel; Future  -     Microalbumin / Creatinine Urine Ratio; Future    Essential hypertension  -     atenolol (TENORMIN) 25 MG tablet; TAKE ONE TABLET BY MOUTH DAILY  -     Comprehensive Metabolic Panel; Future  -     Lipid Panel; Future  -     Microalbumin / Creatinine Urine Ratio;  Future    Greater trochanteric bursitis, unspecified laterality  -     99814 - [place] DRAIN/INJECT LARGE JOINT/BURSA  -     36109 - [place] DRAIN/INJECT LARGE JOINT/BURSA    Stage 3a chronic kidney disease (HCC)    Other orders  -     tamsulosin (FLOMAX) 0.4 MG capsule; Take 1 capsule by mouth daily  Patient is to be seen back in 4 months. After informed consent, under sterile conditions, after negative aspiration, 1 cc of Kenalog 1 cc of lidocaine injected in a fanlike fashion over both greater trochanteric bursa. Postinjection instructions were given. The patient tolerated the procedure well.

## 2023-04-17 RX ORDER — AMLODIPINE BESYLATE 5 MG/1
TABLET ORAL
Qty: 90 TABLET | Refills: 0 | Status: SHIPPED | OUTPATIENT
Start: 2023-04-17

## 2023-04-17 NOTE — TELEPHONE ENCOUNTER
Last Appointment:  1/18/2023  Future Appointments   Date Time Provider Robbin Patiño   5/17/2023  9:30 AM Court Julien  [address]

## 2023-05-17 ENCOUNTER — OFFICE VISIT (OUTPATIENT)
Dept: FAMILY MEDICINE CLINIC | Age: 83
End: 2023-05-17
Payer: MEDICARE

## 2023-05-17 VITALS
SYSTOLIC BLOOD PRESSURE: 150 MMHG | DIASTOLIC BLOOD PRESSURE: 80 MMHG | OXYGEN SATURATION: 97 % | BODY MASS INDEX: 24.96 KG/M2 | TEMPERATURE: 97 F | WEIGHT: 169 LBS | HEART RATE: 64 BPM

## 2023-05-17 DIAGNOSIS — E11.9 TYPE 2 DIABETES MELLITUS WITHOUT COMPLICATION, WITHOUT LONG-TERM CURRENT USE OF INSULIN (HCC): ICD-10-CM

## 2023-05-17 DIAGNOSIS — R06.2 WHEEZE: ICD-10-CM

## 2023-05-17 DIAGNOSIS — R35.0 BENIGN PROSTATIC HYPERPLASIA WITH URINARY FREQUENCY: ICD-10-CM

## 2023-05-17 DIAGNOSIS — N40.1 BENIGN PROSTATIC HYPERPLASIA WITH URINARY FREQUENCY: ICD-10-CM

## 2023-05-17 DIAGNOSIS — I10 ESSENTIAL HYPERTENSION: ICD-10-CM

## 2023-05-17 DIAGNOSIS — E78.2 MIXED HYPERLIPIDEMIA: ICD-10-CM

## 2023-05-17 DIAGNOSIS — H02.009 ENTROPION, UNSPECIFIED LATERALITY: Primary | ICD-10-CM

## 2023-05-17 LAB
ALBUMIN SERPL-MCNC: 4.5 G/DL (ref 3.5–5.2)
ALP SERPL-CCNC: 89 U/L (ref 40–129)
ALT SERPL-CCNC: 8 U/L (ref 0–40)
ANION GAP SERPL CALCULATED.3IONS-SCNC: 15 MMOL/L (ref 7–16)
AST SERPL-CCNC: 14 U/L (ref 0–39)
BILIRUB SERPL-MCNC: <0.2 MG/DL (ref 0–1.2)
BUN SERPL-MCNC: 21 MG/DL (ref 6–23)
CALCIUM SERPL-MCNC: 9.4 MG/DL (ref 8.6–10.2)
CHLORIDE SERPL-SCNC: 104 MMOL/L (ref 98–107)
CHOLESTEROL, TOTAL: 149 MG/DL (ref 0–199)
CO2 SERPL-SCNC: 22 MMOL/L (ref 22–29)
CREAT SERPL-MCNC: 1.3 MG/DL (ref 0.7–1.2)
CREAT UR-MCNC: 80 MG/DL (ref 40–278)
GLUCOSE SERPL-MCNC: 100 MG/DL (ref 74–99)
HBA1C MFR BLD: 7 %
HDLC SERPL-MCNC: 36 MG/DL
LDLC SERPL CALC-MCNC: 85 MG/DL (ref 0–99)
MICROALBUMIN UR-MCNC: 175.6 MG/L
MICROALBUMIN/CREAT UR-RTO: 219.5 (ref 0–30)
POTASSIUM SERPL-SCNC: 4.6 MMOL/L (ref 3.5–5)
PROT SERPL-MCNC: 7.5 G/DL (ref 6.4–8.3)
SODIUM SERPL-SCNC: 141 MMOL/L (ref 132–146)
TRIGL SERPL-MCNC: 141 MG/DL (ref 0–149)
VLDLC SERPL CALC-MCNC: 28 MG/DL

## 2023-05-17 PROCEDURE — 3079F DIAST BP 80-89 MM HG: CPT | Performed by: INTERNAL MEDICINE

## 2023-05-17 PROCEDURE — 1123F ACP DISCUSS/DSCN MKR DOCD: CPT | Performed by: INTERNAL MEDICINE

## 2023-05-17 PROCEDURE — 3077F SYST BP >= 140 MM HG: CPT | Performed by: INTERNAL MEDICINE

## 2023-05-17 PROCEDURE — G8420 CALC BMI NORM PARAMETERS: HCPCS | Performed by: INTERNAL MEDICINE

## 2023-05-17 PROCEDURE — 3051F HG A1C>EQUAL 7.0%<8.0%: CPT | Performed by: INTERNAL MEDICINE

## 2023-05-17 PROCEDURE — 99214 OFFICE O/P EST MOD 30 MIN: CPT | Performed by: INTERNAL MEDICINE

## 2023-05-17 PROCEDURE — 83036 HEMOGLOBIN GLYCOSYLATED A1C: CPT | Performed by: INTERNAL MEDICINE

## 2023-05-17 PROCEDURE — 1036F TOBACCO NON-USER: CPT | Performed by: INTERNAL MEDICINE

## 2023-05-17 PROCEDURE — G8427 DOCREV CUR MEDS BY ELIG CLIN: HCPCS | Performed by: INTERNAL MEDICINE

## 2023-05-17 RX ORDER — FLUTICASONE PROPIONATE 50 MCG
2 SPRAY, SUSPENSION (ML) NASAL DAILY
Qty: 16 G | Refills: 5 | Status: SHIPPED | OUTPATIENT
Start: 2023-05-17

## 2023-05-17 RX ORDER — GEMFIBROZIL 600 MG/1
TABLET, FILM COATED ORAL
Qty: 180 TABLET | Refills: 1 | Status: SHIPPED | OUTPATIENT
Start: 2023-05-17

## 2023-05-17 RX ORDER — SIMVASTATIN 20 MG
20 TABLET ORAL NIGHTLY
Qty: 90 TABLET | Refills: 1 | Status: SHIPPED
Start: 2023-05-17 | End: 2023-05-18 | Stop reason: ALTCHOICE

## 2023-05-17 RX ORDER — ATENOLOL 25 MG/1
TABLET ORAL
Qty: 90 TABLET | Refills: 1 | Status: SHIPPED | OUTPATIENT
Start: 2023-05-17

## 2023-05-17 RX ORDER — TAMSULOSIN HYDROCHLORIDE 0.4 MG/1
0.4 CAPSULE ORAL DAILY
Qty: 90 CAPSULE | Refills: 1 | Status: SHIPPED | OUTPATIENT
Start: 2023-05-17

## 2023-05-17 ASSESSMENT — PATIENT HEALTH QUESTIONNAIRE - PHQ9
SUM OF ALL RESPONSES TO PHQ QUESTIONS 1-9: 0
2. FEELING DOWN, DEPRESSED OR HOPELESS: 0
SUM OF ALL RESPONSES TO PHQ QUESTIONS 1-9: 0
SUM OF ALL RESPONSES TO PHQ9 QUESTIONS 1 & 2: 0
SUM OF ALL RESPONSES TO PHQ QUESTIONS 1-9: 0
SUM OF ALL RESPONSES TO PHQ QUESTIONS 1-9: 0
1. LITTLE INTEREST OR PLEASURE IN DOING THINGS: 0

## 2023-05-17 NOTE — PROGRESS NOTES
19  Sofie Parada : 1940 Sex: male  Age: 80 y.o. No chief complaint on file. Patient is followed by urology yearly basis. Slightly overdue. Flomax has been very successful for BPH symptomatology. Hemoglobin A1c is well controlled at 7. Patient does not take blood sugars at home. Blood pressure is somewhat elevated but his blood pressures at home have been okay. The patient does have a lot of tearing going on and it appears that he has bilateral entropion of his lower lid. This will need further evaluation by ophthalmology. Patient does have a chronic cough he was a previous smoker remotely. He is also having a lot of allergy symptoms currently. He denies fever, chills, sweats. He really does not have a productive cough and has not been more prominent. He denies shortness of breath with exertion. He is denying any chest pain or chest pressure. I do not see any evidence of previous pulmonary function test but he did have a little bit of wheezing which cleared with cough on exam today so I will order a PFT. Patient for some reason skipped his lab work last time but this will be ordered today. He denies any foaminess to his urine. Hyperlipidemia    Hypertension    Diabetes    Other      Review of Systems  Health Maintenance:  Influenza Vaccination - (10/60400)  Colonoscopy Screening - (2014)  Colonoscopy - (2009)  Colonoscopy - (2014)  Colonoscopy -  [place],  [place] diverticular dx next -SEE REPORT  Prostate Exam - ()SCOLERI  Psa Test - ()  Physical Exam -2023  Rectal Exam - (2014)  Zoster Vaccine - ()  Pneumonia Vaccination - ()  Eye Exam -referred to Dr. Justin Dill 2023  Prevnar - (3/18/2015)  Urine Microalbumin - (2023)  Dilated Eye Exam -as above  Diabetic Foot Exam - (19)  COVID VACCINE 2/2021 x 3  1. Noninsulin requiring diabetes. 2. Hyperlipidemia. 3. Hypertension. 4. Osteoarthritis.  Lanette Grover

## 2023-05-18 DIAGNOSIS — N18.31 STAGE 3A CHRONIC KIDNEY DISEASE (HCC): ICD-10-CM

## 2023-05-18 DIAGNOSIS — R80.9 MICROALBUMINURIA: Primary | ICD-10-CM

## 2023-05-18 RX ORDER — SIMVASTATIN 40 MG
40 TABLET ORAL NIGHTLY
Qty: 30 TABLET | Refills: 5 | Status: SHIPPED | OUTPATIENT
Start: 2023-05-18

## 2023-05-18 RX ORDER — VALSARTAN 80 MG/1
80 TABLET ORAL DAILY
Qty: 30 TABLET | Refills: 5 | Status: SHIPPED | OUTPATIENT
Start: 2023-05-18

## 2023-06-19 DIAGNOSIS — R80.9 MICROALBUMINURIA: ICD-10-CM

## 2023-06-19 DIAGNOSIS — N18.31 STAGE 3A CHRONIC KIDNEY DISEASE (HCC): ICD-10-CM

## 2023-06-19 LAB
ANION GAP SERPL CALCULATED.3IONS-SCNC: 13 MMOL/L (ref 7–16)
BUN SERPL-MCNC: 25 MG/DL (ref 6–23)
CALCIUM SERPL-MCNC: 9.4 MG/DL (ref 8.6–10.2)
CHLORIDE SERPL-SCNC: 104 MMOL/L (ref 98–107)
CO2 SERPL-SCNC: 20 MMOL/L (ref 22–29)
CREAT SERPL-MCNC: 1.4 MG/DL (ref 0.7–1.2)
CREAT UR-MCNC: 18 MG/DL (ref 40–278)
GLUCOSE SERPL-MCNC: 111 MG/DL (ref 74–99)
MICROALBUMIN UR-MCNC: 12 MG/L
MICROALBUMIN/CREAT UR-RTO: 66.7 (ref 0–30)
POTASSIUM SERPL-SCNC: 5.2 MMOL/L (ref 3.5–5)
SODIUM SERPL-SCNC: 137 MMOL/L (ref 132–146)

## 2023-09-27 ENCOUNTER — OFFICE VISIT (OUTPATIENT)
Dept: FAMILY MEDICINE CLINIC | Age: 83
End: 2023-09-27

## 2023-09-27 VITALS
OXYGEN SATURATION: 96 % | DIASTOLIC BLOOD PRESSURE: 60 MMHG | SYSTOLIC BLOOD PRESSURE: 140 MMHG | BODY MASS INDEX: 24.51 KG/M2 | HEART RATE: 69 BPM | TEMPERATURE: 97.3 F | WEIGHT: 166 LBS

## 2023-09-27 DIAGNOSIS — E11.9 TYPE 2 DIABETES MELLITUS WITHOUT COMPLICATION, WITHOUT LONG-TERM CURRENT USE OF INSULIN (HCC): Primary | ICD-10-CM

## 2023-09-27 DIAGNOSIS — R80.9 MICROALBUMINURIA: ICD-10-CM

## 2023-09-27 DIAGNOSIS — I10 ESSENTIAL HYPERTENSION: ICD-10-CM

## 2023-09-27 DIAGNOSIS — M62.89 HAMSTRING TIGHTNESS: ICD-10-CM

## 2023-09-27 DIAGNOSIS — M17.0 PRIMARY OSTEOARTHRITIS OF BOTH KNEES: ICD-10-CM

## 2023-09-27 LAB — HBA1C MFR BLD: 6.3 %

## 2023-09-27 NOTE — PROGRESS NOTES
19  Myriam Fat : 1940 Sex: male  Age: 80 y.o. Chief Complaint   Patient presents with    Diabetes    Hypertension         Patient states that his cough is subsided. He did not see the ophthalmologist as requested and did not do the pulmonary function test.  Patient states that he is not having any difficulty with his breathing at this point in time. He is having some hamstring pain. He states it is worse with prolonged walking. The pain does not radiate below the knee. He did go to Dr. Leonel Carter for this and felt that it was coming from his back but lied to the examination and explained to him lumbar dermatomes, I doubt this is a back related issue. When I tried to do flexibility stretching with him in terms of his hamstring he became very tender in the hamstring area. He does walk with a flexed knee and I believe that the hamstring is obviously the problem. I did advise him to do some hamstring stretching and asked him to go online to find this as he did not want to go to physical therapy. His blood sugars are well controlled. He has not had hypoglycemic reactions. His blood pressures at home is well controlled. He did have urine microalbuminuria and we discussed this today. He was placed on valsartan low-dose and his simvastatin was increased at the last visit in May. We will check these labs today.     Hyperlipidemia    Hypertension    Diabetes    Other        Review of Systems  Health Maintenance:  Influenza Vaccination - (10/10748)  Colonoscopy Screening - (2014)  Colonoscopy - (2009)  Colonoscopy - (2014)  Colonoscopy -  [place],  [place] diverticular dx next -SEE REPORT  Prostate Exam - ()SCOLERI  Psa Test - ()  Physical Exam -2023  Rectal Exam - (2014)  Zoster Vaccine - ()  Pneumonia Vaccination - ()  Eye Exam -referred to Dr. Jone Crespo 2023  Prevnar - (3/18/2015)  Urine Microalbumin - (2023)  Dilated Eye Exam

## 2023-09-29 DIAGNOSIS — I10 ESSENTIAL HYPERTENSION: ICD-10-CM

## 2023-09-29 DIAGNOSIS — M17.0 PRIMARY OSTEOARTHRITIS OF BOTH KNEES: ICD-10-CM

## 2023-09-29 DIAGNOSIS — E11.9 TYPE 2 DIABETES MELLITUS WITHOUT COMPLICATION, WITHOUT LONG-TERM CURRENT USE OF INSULIN (HCC): ICD-10-CM

## 2023-09-29 DIAGNOSIS — R80.9 MICROALBUMINURIA: ICD-10-CM

## 2023-10-02 LAB
ALBUMIN SERPL-MCNC: 4.7 G/DL (ref 3.5–5.2)
ALP BLD-CCNC: 72 U/L (ref 40–129)
ALT SERPL-CCNC: 8 U/L (ref 0–40)
ANION GAP SERPL CALCULATED.3IONS-SCNC: 16 MMOL/L (ref 7–16)
AST SERPL-CCNC: 12 U/L (ref 0–39)
BILIRUB SERPL-MCNC: 0.2 MG/DL (ref 0–1.2)
BUN BLDV-MCNC: 23 MG/DL (ref 6–23)
CALCIUM SERPL-MCNC: 8.9 MG/DL (ref 8.6–10.2)
CHLORIDE BLD-SCNC: 105 MMOL/L (ref 98–107)
CHOLESTEROL: 156 MG/DL
CO2: 18 MMOL/L (ref 22–29)
CREAT SERPL-MCNC: 1.5 MG/DL (ref 0.7–1.2)
CREATININE URINE: 58.7 MG/DL (ref 40–278)
GFR SERPL CREATININE-BSD FRML MDRD: 47 ML/MIN/1.73M2
GLUCOSE BLD-MCNC: 118 MG/DL (ref 74–99)
HDLC SERPL-MCNC: 41 MG/DL
LDL CHOLESTEROL: 97 MG/DL
MICROALBUMIN/CREAT 24H UR: 39 MG/L (ref 0–19)
MICROALBUMIN/CREAT UR-RTO: 66 MCG/MG CREAT (ref 0–30)
POTASSIUM SERPL-SCNC: 5 MMOL/L (ref 3.5–5)
SODIUM BLD-SCNC: 139 MMOL/L (ref 132–146)
TOTAL PROTEIN: 7.2 G/DL (ref 6.4–8.3)
TRIGL SERPL-MCNC: 90 MG/DL
VLDLC SERPL CALC-MCNC: 18 MG/DL

## 2023-10-09 RX ORDER — AMLODIPINE BESYLATE 5 MG/1
TABLET ORAL
Qty: 90 TABLET | Refills: 1 | Status: SHIPPED | OUTPATIENT
Start: 2023-10-09

## 2023-11-13 RX ORDER — VALSARTAN 80 MG/1
80 TABLET ORAL DAILY
Qty: 30 TABLET | Refills: 0 | Status: SHIPPED | OUTPATIENT
Start: 2023-11-13

## 2023-11-15 RX ORDER — SIMVASTATIN 40 MG
40 TABLET ORAL
Qty: 30 TABLET | Refills: 2 | Status: SHIPPED | OUTPATIENT
Start: 2023-11-15

## 2023-11-15 NOTE — TELEPHONE ENCOUNTER
Last Appointment:  9/27/2023  Future Appointments   Date Time Provider Department Center   1/23/2024 11:00 AM Thomas Schmidt MD [facility]

## 2023-11-30 DIAGNOSIS — M19.90 ARTHRITIS PAIN: Primary | ICD-10-CM

## 2023-11-30 RX ORDER — TRAMADOL HYDROCHLORIDE 50 MG/1
50 TABLET ORAL EVERY 4 HOURS PRN
Qty: 42 TABLET | Refills: 0 | Status: SHIPPED | OUTPATIENT
Start: 2023-11-30 | End: 2023-12-07

## 2024-01-05 RX ORDER — VALSARTAN 80 MG/1
80 TABLET ORAL DAILY
Qty: 30 TABLET | Refills: 0 | Status: SHIPPED | OUTPATIENT
Start: 2024-01-05

## 2024-01-11 DIAGNOSIS — I73.9 PVD (PERIPHERAL VASCULAR DISEASE) (HCC): Primary | ICD-10-CM

## 2024-01-11 DIAGNOSIS — I70.223 ATHEROSCLEROSIS OF NATIVE ARTERIES OF EXTREMITIES WITH REST PAIN, BILATERAL LEGS (HCC): ICD-10-CM

## 2024-01-13 DIAGNOSIS — E78.2 MIXED HYPERLIPIDEMIA: ICD-10-CM

## 2024-01-15 RX ORDER — GEMFIBROZIL 600 MG/1
TABLET, FILM COATED ORAL
Qty: 180 TABLET | Refills: 0 | Status: SHIPPED | OUTPATIENT
Start: 2024-01-15

## 2024-01-15 NOTE — TELEPHONE ENCOUNTER
Last Appointment:  9/27/2023    Future appts:  Future Appointments   Date Time Provider Department Center   1/23/2024 11:00 AM Thomas Schmidt MD [facility]   1/23/2024 11:15 AM Thomas Schmidt MD [facility]

## 2024-01-23 ENCOUNTER — OFFICE VISIT (OUTPATIENT)
Dept: FAMILY MEDICINE CLINIC | Age: 84
End: 2024-01-23
Payer: MEDICARE

## 2024-01-23 VITALS
TEMPERATURE: 98 F | HEART RATE: 60 BPM | BODY MASS INDEX: 24.81 KG/M2 | DIASTOLIC BLOOD PRESSURE: 70 MMHG | WEIGHT: 168 LBS | OXYGEN SATURATION: 98 % | SYSTOLIC BLOOD PRESSURE: 130 MMHG

## 2024-01-23 DIAGNOSIS — I10 ESSENTIAL (PRIMARY) HYPERTENSION: ICD-10-CM

## 2024-01-23 DIAGNOSIS — E78.2 MIXED HYPERLIPIDEMIA: ICD-10-CM

## 2024-01-23 DIAGNOSIS — I10 ESSENTIAL HYPERTENSION: ICD-10-CM

## 2024-01-23 DIAGNOSIS — M17.0 PRIMARY OSTEOARTHRITIS OF BOTH KNEES: ICD-10-CM

## 2024-01-23 DIAGNOSIS — D50.9 IRON DEFICIENCY ANEMIA, UNSPECIFIED IRON DEFICIENCY ANEMIA TYPE: ICD-10-CM

## 2024-01-23 DIAGNOSIS — I73.9 PVD (PERIPHERAL VASCULAR DISEASE) (HCC): ICD-10-CM

## 2024-01-23 DIAGNOSIS — E11.9 TYPE 2 DIABETES MELLITUS WITHOUT COMPLICATION, WITHOUT LONG-TERM CURRENT USE OF INSULIN (HCC): Primary | ICD-10-CM

## 2024-01-23 LAB
ABSOLUTE IMMATURE GRANULOCYTE: 0.04 K/UL (ref 0–0.58)
ALBUMIN SERPL-MCNC: 4.8 G/DL (ref 3.5–5.2)
ALP BLD-CCNC: 70 U/L (ref 40–129)
ALT SERPL-CCNC: 10 U/L (ref 0–40)
ANION GAP SERPL CALCULATED.3IONS-SCNC: 14 MMOL/L (ref 7–16)
AST SERPL-CCNC: 14 U/L (ref 0–39)
BASOPHILS ABSOLUTE: 0.03 K/UL (ref 0–0.2)
BASOPHILS RELATIVE PERCENT: 0 % (ref 0–2)
BILIRUB SERPL-MCNC: 0.2 MG/DL (ref 0–1.2)
BUN BLDV-MCNC: 25 MG/DL (ref 6–23)
CALCIUM SERPL-MCNC: 9 MG/DL (ref 8.6–10.2)
CHLORIDE BLD-SCNC: 106 MMOL/L (ref 98–107)
CHOLESTEROL: 164 MG/DL
CO2: 19 MMOL/L (ref 22–29)
CREAT SERPL-MCNC: 1.5 MG/DL (ref 0.7–1.2)
EOSINOPHILS ABSOLUTE: 1.05 K/UL (ref 0.05–0.5)
EOSINOPHILS RELATIVE PERCENT: 15 % (ref 0–6)
GFR SERPL CREATININE-BSD FRML MDRD: 47 ML/MIN/1.73M2
GLUCOSE BLD-MCNC: 79 MG/DL (ref 74–99)
HBA1C MFR BLD: 6.4 %
HCT VFR BLD CALC: 36.2 % (ref 37–54)
HDLC SERPL-MCNC: 42 MG/DL
HEMOGLOBIN: 11.3 G/DL (ref 12.5–16.5)
IMMATURE GRANULOCYTES: 1 % (ref 0–5)
LDL CHOLESTEROL: 89 MG/DL
LYMPHOCYTES ABSOLUTE: 2.71 K/UL (ref 1.5–4)
LYMPHOCYTES RELATIVE PERCENT: 40 % (ref 20–42)
MCH RBC QN AUTO: 28.3 PG (ref 26–35)
MCHC RBC AUTO-ENTMCNC: 31.2 G/DL (ref 32–34.5)
MCV RBC AUTO: 90.7 FL (ref 80–99.9)
MONOCYTES ABSOLUTE: 0.47 K/UL (ref 0.1–0.95)
MONOCYTES RELATIVE PERCENT: 7 % (ref 2–12)
NEUTROPHILS ABSOLUTE: 2.53 K/UL (ref 1.8–7.3)
NEUTROPHILS RELATIVE PERCENT: 37 % (ref 43–80)
PDW BLD-RTO: 13.3 % (ref 11.5–15)
PLATELET # BLD: 299 K/UL (ref 130–450)
PMV BLD AUTO: 11 FL (ref 7–12)
POTASSIUM SERPL-SCNC: 5.4 MMOL/L (ref 3.5–5)
RBC # BLD: 3.99 M/UL (ref 3.8–5.8)
SODIUM BLD-SCNC: 139 MMOL/L (ref 132–146)
TOTAL PROTEIN: 7.4 G/DL (ref 6.4–8.3)
TRIGL SERPL-MCNC: 166 MG/DL
VLDLC SERPL CALC-MCNC: 33 MG/DL
WBC # BLD: 6.8 K/UL (ref 4.5–11.5)

## 2024-01-23 PROCEDURE — 3044F HG A1C LEVEL LT 7.0%: CPT | Performed by: INTERNAL MEDICINE

## 2024-01-23 PROCEDURE — G8484 FLU IMMUNIZE NO ADMIN: HCPCS | Performed by: INTERNAL MEDICINE

## 2024-01-23 PROCEDURE — 99214 OFFICE O/P EST MOD 30 MIN: CPT | Performed by: INTERNAL MEDICINE

## 2024-01-23 PROCEDURE — 83036 HEMOGLOBIN GLYCOSYLATED A1C: CPT | Performed by: INTERNAL MEDICINE

## 2024-01-23 PROCEDURE — 3078F DIAST BP <80 MM HG: CPT | Performed by: INTERNAL MEDICINE

## 2024-01-23 PROCEDURE — 1036F TOBACCO NON-USER: CPT | Performed by: INTERNAL MEDICINE

## 2024-01-23 PROCEDURE — G8427 DOCREV CUR MEDS BY ELIG CLIN: HCPCS | Performed by: INTERNAL MEDICINE

## 2024-01-23 PROCEDURE — 3075F SYST BP GE 130 - 139MM HG: CPT | Performed by: INTERNAL MEDICINE

## 2024-01-23 PROCEDURE — 1123F ACP DISCUSS/DSCN MKR DOCD: CPT | Performed by: INTERNAL MEDICINE

## 2024-01-23 PROCEDURE — G8420 CALC BMI NORM PARAMETERS: HCPCS | Performed by: INTERNAL MEDICINE

## 2024-01-23 RX ORDER — VALSARTAN 80 MG/1
80 TABLET ORAL DAILY
Qty: 30 TABLET | Refills: 0 | Status: SHIPPED | OUTPATIENT
Start: 2024-01-23

## 2024-01-23 RX ORDER — GEMFIBROZIL 600 MG/1
TABLET, FILM COATED ORAL
Qty: 180 TABLET | Refills: 1 | Status: SHIPPED | OUTPATIENT
Start: 2024-01-23

## 2024-01-23 RX ORDER — SIMVASTATIN 40 MG
40 TABLET ORAL NIGHTLY
Qty: 90 TABLET | Refills: 1 | Status: SHIPPED | OUTPATIENT
Start: 2024-01-23

## 2024-01-23 RX ORDER — TAMSULOSIN HYDROCHLORIDE 0.4 MG/1
0.4 CAPSULE ORAL DAILY
Qty: 90 CAPSULE | Refills: 1 | Status: SHIPPED | OUTPATIENT
Start: 2024-01-23

## 2024-01-23 NOTE — PROGRESS NOTES
Exam  Objective  There were no vitals filed for this visit.      Exam:  Const: Appears healthy, well developed and well nourished. Appears to weigh within normal range.  Eyes: EOMI in both eyes. PERRL.  Tearing with entropion bilaterally of the lower lid.  ENMT: External ears WNL. Tympanic membranes are intact. External nose WNL.  Neck: Supple and symmetric. Palpation reveals no adenopathy. No masses appreciated. Thyroid:  no nodule appreciated or thyromegaly. No jugular venous distention.  Resp: Respirations are unlabored. Respiration rate is normal. Auscultate mildly decreased airflow. No rhonchorous sounds.  Minimal wheeze right upper lung field on the right posteriorly.  Clears with cough.  CV: Rhythm is regular. S1 is normal. S2 is normal. Carotids with bruits bilaterally. Abdominal aorta:  not palpable. Pedal pulses: 2+ and equal bilaterally. Extremities: No clubbing, cyanosis or edema.  Minimal Doppler pulse detected posterior tibial right.  Abdomen: Bowel sounds are normoactive. Palpation reveals softness, with no CVA tenderness,  distension, organomegaly, rebound tenderness or tenderness.  Easily  reducible umbilical hernia.. No  palpable hepatosplenomegaly.  Femoral bruits more prominent on the right than the left.  Musculo: Walks with a limping gait.  Upper Extremities: ROM: Discomfort, limitation, pain and pain on  resistance with movement of the left upper extremity. Lower Extremities: ROM: Limited range of motion with flexion extension inversion eversion of both hips.  Pain over the greater trochanteric bursa with deep palpation over the left hip.  Skin: No evidence of gangrene peripherally of the lower extremity.  Neuro: Alert and oriented x3. Mood is normal. Affect is normal. Speech is articulate and fluent.  DTR's are symmetric, intact and 2+ bilaterally, vibratory sense of the lower extremity is intact.  Diagnoses and all orders for this visit:    Type 2 diabetes mellitus without complication,

## 2024-01-24 DIAGNOSIS — D63.1 ANEMIA DUE TO STAGE 3A CHRONIC KIDNEY DISEASE (HCC): Primary | ICD-10-CM

## 2024-01-24 DIAGNOSIS — N18.31 ANEMIA DUE TO STAGE 3A CHRONIC KIDNEY DISEASE (HCC): Primary | ICD-10-CM

## 2024-01-29 DIAGNOSIS — I10 ESSENTIAL HYPERTENSION: ICD-10-CM

## 2024-01-29 RX ORDER — ATENOLOL 25 MG/1
TABLET ORAL
Qty: 90 TABLET | Refills: 1 | Status: SHIPPED | OUTPATIENT
Start: 2024-01-29

## 2024-01-29 NOTE — TELEPHONE ENCOUNTER
Last Appointment:  1/23/2024    Future appts:  Future Appointments   Date Time Provider Department Center   2/16/2024 11:00 AM Thomas Schmidt MD [facility]   5/21/2024  3:30 PM Thomas Schmidt MD [facility]

## 2024-02-14 DIAGNOSIS — R91.1 LUNG NODULE SEEN ON IMAGING STUDY: Primary | ICD-10-CM

## 2024-02-14 DIAGNOSIS — E11.9 TYPE 2 DIABETES MELLITUS WITHOUT COMPLICATION, WITHOUT LONG-TERM CURRENT USE OF INSULIN (HCC): ICD-10-CM

## 2024-02-14 NOTE — TELEPHONE ENCOUNTER
Last Appointment:  1/23/2024  Future Appointments   Date Time Provider Department Center   2/16/2024 11:00 AM Thomas Schmidt MD [facility]   5/21/2024  3:30 PM Thomas Schmidt MD [facility]

## 2024-02-16 ENCOUNTER — OFFICE VISIT (OUTPATIENT)
Dept: FAMILY MEDICINE CLINIC | Age: 84
End: 2024-02-16
Payer: MEDICARE

## 2024-02-16 VITALS
DIASTOLIC BLOOD PRESSURE: 70 MMHG | TEMPERATURE: 97.8 F | WEIGHT: 169 LBS | SYSTOLIC BLOOD PRESSURE: 132 MMHG | OXYGEN SATURATION: 99 % | HEART RATE: 58 BPM | BODY MASS INDEX: 24.96 KG/M2

## 2024-02-16 DIAGNOSIS — I73.9 PVD (PERIPHERAL VASCULAR DISEASE) (HCC): ICD-10-CM

## 2024-02-16 DIAGNOSIS — I10 ESSENTIAL HYPERTENSION: ICD-10-CM

## 2024-02-16 DIAGNOSIS — E11.9 TYPE 2 DIABETES MELLITUS WITHOUT COMPLICATION, WITHOUT LONG-TERM CURRENT USE OF INSULIN (HCC): ICD-10-CM

## 2024-02-16 DIAGNOSIS — I10 ESSENTIAL HYPERTENSION: Primary | ICD-10-CM

## 2024-02-16 DIAGNOSIS — R91.1 LUNG NODULE: ICD-10-CM

## 2024-02-16 DIAGNOSIS — D64.9 ANEMIA, UNSPECIFIED TYPE: ICD-10-CM

## 2024-02-16 DIAGNOSIS — E78.2 MIXED HYPERLIPIDEMIA: ICD-10-CM

## 2024-02-16 LAB
ABSOLUTE IMMATURE GRANULOCYTE: 0.04 K/UL (ref 0–0.58)
ABSOLUTE RETIC #: 0.07 M/UL
ALBUMIN SERPL-MCNC: 4.6 G/DL (ref 3.5–5.2)
ALP BLD-CCNC: 76 U/L (ref 40–129)
ALT SERPL-CCNC: 9 U/L (ref 0–40)
ANION GAP SERPL CALCULATED.3IONS-SCNC: 18 MMOL/L (ref 7–16)
AST SERPL-CCNC: 18 U/L (ref 0–39)
BASOPHILS ABSOLUTE: 0.03 K/UL (ref 0–0.2)
BASOPHILS RELATIVE PERCENT: 1 % (ref 0–2)
BILIRUB SERPL-MCNC: 0.2 MG/DL (ref 0–1.2)
BUN BLDV-MCNC: 28 MG/DL (ref 6–23)
CALCIUM SERPL-MCNC: 9.1 MG/DL (ref 8.6–10.2)
CHLORIDE BLD-SCNC: 105 MMOL/L (ref 98–107)
CO2: 16 MMOL/L (ref 22–29)
CREAT SERPL-MCNC: 1.4 MG/DL (ref 0.7–1.2)
EOSINOPHILS ABSOLUTE: 0.78 K/UL (ref 0.05–0.5)
EOSINOPHILS RELATIVE PERCENT: 12 % (ref 0–6)
FERRITIN: 81 NG/ML
FOLATE: >20 NG/ML (ref 4.8–24.2)
GFR SERPL CREATININE-BSD FRML MDRD: 50 ML/MIN/1.73M2
GLUCOSE BLD-MCNC: 93 MG/DL (ref 74–99)
HCT VFR BLD CALC: 36.5 % (ref 37–54)
HEMOGLOBIN: 11.5 G/DL (ref 12.5–16.5)
IMMATURE GRANULOCYTES: 1 % (ref 0–5)
IMMATURE RETIC FRACT: 7.6 % (ref 2.3–13.4)
IRON % SATURATION: 22 % (ref 20–55)
IRON: 95 UG/DL (ref 59–158)
LYMPHOCYTES ABSOLUTE: 2.37 K/UL (ref 1.5–4)
LYMPHOCYTES RELATIVE PERCENT: 36 % (ref 20–42)
MCH RBC QN AUTO: 28.5 PG (ref 26–35)
MCHC RBC AUTO-ENTMCNC: 31.5 G/DL (ref 32–34.5)
MCV RBC AUTO: 90.6 FL (ref 80–99.9)
MONOCYTES ABSOLUTE: 0.51 K/UL (ref 0.1–0.95)
MONOCYTES RELATIVE PERCENT: 8 % (ref 2–12)
NEUTROPHILS ABSOLUTE: 2.89 K/UL (ref 1.8–7.3)
NEUTROPHILS RELATIVE PERCENT: 44 % (ref 43–80)
PDW BLD-RTO: 13.2 % (ref 11.5–15)
PLATELET, FLUORESCENCE: 293 K/UL (ref 130–450)
PMV BLD AUTO: 11 FL (ref 7–12)
POTASSIUM SERPL-SCNC: 5.3 MMOL/L (ref 3.5–5)
RBC # BLD: 4.03 M/UL (ref 3.8–5.8)
RETIC %: 1.8 % (ref 0.4–1.9)
RETIC HEMOGLOBIN: 32 PG (ref 28.2–36.6)
SODIUM BLD-SCNC: 139 MMOL/L (ref 132–146)
TOTAL IRON BINDING CAPACITY: 435 UG/DL (ref 250–450)
TOTAL PROTEIN: 7.4 G/DL (ref 6.4–8.3)
VITAMIN B-12: 750 PG/ML (ref 211–946)
WBC # BLD: 6.6 K/UL (ref 4.5–11.5)

## 2024-02-16 PROCEDURE — G8420 CALC BMI NORM PARAMETERS: HCPCS | Performed by: INTERNAL MEDICINE

## 2024-02-16 PROCEDURE — 3075F SYST BP GE 130 - 139MM HG: CPT | Performed by: INTERNAL MEDICINE

## 2024-02-16 PROCEDURE — 3044F HG A1C LEVEL LT 7.0%: CPT | Performed by: INTERNAL MEDICINE

## 2024-02-16 PROCEDURE — 3078F DIAST BP <80 MM HG: CPT | Performed by: INTERNAL MEDICINE

## 2024-02-16 PROCEDURE — G8427 DOCREV CUR MEDS BY ELIG CLIN: HCPCS | Performed by: INTERNAL MEDICINE

## 2024-02-16 PROCEDURE — G8484 FLU IMMUNIZE NO ADMIN: HCPCS | Performed by: INTERNAL MEDICINE

## 2024-02-16 PROCEDURE — 1123F ACP DISCUSS/DSCN MKR DOCD: CPT | Performed by: INTERNAL MEDICINE

## 2024-02-16 PROCEDURE — 99214 OFFICE O/P EST MOD 30 MIN: CPT | Performed by: INTERNAL MEDICINE

## 2024-02-16 PROCEDURE — 1036F TOBACCO NON-USER: CPT | Performed by: INTERNAL MEDICINE

## 2024-02-16 RX ORDER — AMLODIPINE BESYLATE 5 MG/1
5 TABLET ORAL DAILY
Qty: 90 TABLET | Refills: 1 | Status: SHIPPED | OUTPATIENT
Start: 2024-02-16

## 2024-02-16 NOTE — PROGRESS NOTES
19  Ramy Mancini : 1940 Sex: male  Age: 83 y.o.    Chief Complaint   Patient presents with    Results     Discuss results and referrals           Patient recently had a CT angiography of the lower extremity.  This did indicate severe peripheral vascular disease especially of the right compared to the left.  I did discuss this with radiology.  The patient also was discovered to have a 15 mm lingula nodule and this will need further evaluation with CT of the chest with and without contrast per radiology.  Patient is not symptomatic in terms of the lung issue.  He does have an occasional cough but he denies any hemoptysis.  His weight is stable.  He denies fever, chills, sweats.  He has had no night sweats.  He is having some claudication symptomatology on the right side which was initially attributed to more musculoskeletal complaints but I believe it correlates more closely with his peripheral vascular disease.  Patient also has not anemia.  This is worsening recently and this will not need further workup.  I did order labs at his last visit but somehow they were not obtained.  I will get these labs today.  If he is iron deficient or if his stool is positive for occult blood he will need colonoscopy.  Last colonoscopy was done by Dr. Wahl in .    Hyperlipidemia    Hypertension    Diabetes    Other        Review of Systems  Health Maintenance:  Influenza Vaccination - (10/2023)  Colonoscopy Screening - (2014)  Colonoscopy - (2009)  Colonoscopy - (2014)  Colonoscopy -  FLOYD,  floyd diverticular dx next -SEE REPORT  Prostate Exam - ()SCOLERI  Psa Test - ()  Physical Exam 2024  Rectal Exam - (2014)  Zoster Vaccine - ()  Pneumonia Vaccination - ()  Eye Exam -referred to Dr. Larry 2023  Prevnar - (3/18/2015)  Urine Microalbumin - (2023)  Dilated Eye Exam -as above  Diabetic Foot Exam - 2024  COVID VACCINE 2/2021 x 3

## 2024-02-19 DIAGNOSIS — D64.9 ANEMIA, UNSPECIFIED TYPE: ICD-10-CM

## 2024-02-19 DIAGNOSIS — E78.2 MIXED HYPERLIPIDEMIA: ICD-10-CM

## 2024-02-19 DIAGNOSIS — I10 ESSENTIAL HYPERTENSION: ICD-10-CM

## 2024-02-19 DIAGNOSIS — E11.9 TYPE 2 DIABETES MELLITUS WITHOUT COMPLICATION, WITHOUT LONG-TERM CURRENT USE OF INSULIN (HCC): ICD-10-CM

## 2024-02-19 LAB
DATE, STOOL #1: NORMAL
DATE, STOOL #2: NORMAL
DATE, STOOL #3: NORMAL
HEMOCCULT SP1 STL QL: NEGATIVE
TIME, STOOL #1: NORMAL
TIME, STOOL #2: NORMAL
TIME, STOOL #3: NORMAL

## 2024-03-08 DIAGNOSIS — R91.8 LUNG MASS: Primary | ICD-10-CM

## 2024-03-20 RX ORDER — VALSARTAN 80 MG/1
80 TABLET ORAL DAILY
Qty: 90 TABLET | Refills: 1 | Status: SHIPPED | OUTPATIENT
Start: 2024-03-20

## 2024-03-20 NOTE — TELEPHONE ENCOUNTER
Wife called for a refill on Valsartan. Rx for 90 day supply is ready to be sent to [facility].     Last Appointment:  2/16/2024  Future Appointments   Date Time Provider Department Center   5/21/2024  3:30 PM Thomas Schmidt MD [facility]    '

## 2024-04-04 LAB — GLUCOSE FINGERSTICK: 113 MG/DL (ref 70–99)

## 2024-05-08 ENCOUNTER — TELEPHONE (OUTPATIENT)
Dept: FAMILY MEDICINE CLINIC | Age: 84
End: 2024-05-08

## 2024-05-08 NOTE — TELEPHONE ENCOUNTER
Patient's wife sent a message via her my chart regarding the patient.  Copied from her chart     \"  Ramy doesn't have an appointment till the 23rd of this month. He can hardly walk and his fingers are numb. Would it be possible to work him in in [place] on this Thursday  \"    I advised her that you would be out of the office for the rest of the week, asking her how long he's been having the symptoms.  Her response \"Around a month. He was going to get his legs operated on but they found that thing on his lungs. So he never did hear from that Doctor so he wants to go to someone else. He is sleeping alot too. \"    The patient does see you 5/21, do not think there is a sooner appointment available.

## 2024-05-08 NOTE — TELEPHONE ENCOUNTER
See note in media from Dr Deras.  He was to follow up with him after he addressed the lung.    I talked to Shahana and informed her of the appointment.  He does not want to see Dr Deras again. KATERYNA

## 2024-05-11 DIAGNOSIS — E11.9 TYPE 2 DIABETES MELLITUS WITHOUT COMPLICATION, WITHOUT LONG-TERM CURRENT USE OF INSULIN (HCC): ICD-10-CM

## 2024-05-14 ENCOUNTER — OFFICE VISIT (OUTPATIENT)
Dept: FAMILY MEDICINE CLINIC | Age: 84
End: 2024-05-14
Payer: MEDICARE

## 2024-05-14 ENCOUNTER — TELEPHONE (OUTPATIENT)
Dept: VASCULAR SURGERY | Age: 84
End: 2024-05-14

## 2024-05-14 VITALS
WEIGHT: 162 LBS | HEART RATE: 62 BPM | TEMPERATURE: 98 F | BODY MASS INDEX: 23.92 KG/M2 | DIASTOLIC BLOOD PRESSURE: 70 MMHG | SYSTOLIC BLOOD PRESSURE: 120 MMHG | OXYGEN SATURATION: 99 %

## 2024-05-14 DIAGNOSIS — D64.9 ANEMIA, UNSPECIFIED TYPE: ICD-10-CM

## 2024-05-14 DIAGNOSIS — I10 ESSENTIAL HYPERTENSION: ICD-10-CM

## 2024-05-14 DIAGNOSIS — R91.1 LUNG NODULE: ICD-10-CM

## 2024-05-14 DIAGNOSIS — E78.2 MIXED HYPERLIPIDEMIA: ICD-10-CM

## 2024-05-14 DIAGNOSIS — E11.9 TYPE 2 DIABETES MELLITUS WITHOUT COMPLICATION, WITHOUT LONG-TERM CURRENT USE OF INSULIN (HCC): ICD-10-CM

## 2024-05-14 DIAGNOSIS — I73.9 PVD (PERIPHERAL VASCULAR DISEASE) (HCC): ICD-10-CM

## 2024-05-14 DIAGNOSIS — R41.3 MEMORY LOSS: Primary | ICD-10-CM

## 2024-05-14 PROCEDURE — 4004F PT TOBACCO SCREEN RCVD TLK: CPT | Performed by: INTERNAL MEDICINE

## 2024-05-14 PROCEDURE — 3044F HG A1C LEVEL LT 7.0%: CPT | Performed by: INTERNAL MEDICINE

## 2024-05-14 PROCEDURE — 1123F ACP DISCUSS/DSCN MKR DOCD: CPT | Performed by: INTERNAL MEDICINE

## 2024-05-14 PROCEDURE — G8420 CALC BMI NORM PARAMETERS: HCPCS | Performed by: INTERNAL MEDICINE

## 2024-05-14 PROCEDURE — 3074F SYST BP LT 130 MM HG: CPT | Performed by: INTERNAL MEDICINE

## 2024-05-14 PROCEDURE — 3078F DIAST BP <80 MM HG: CPT | Performed by: INTERNAL MEDICINE

## 2024-05-14 PROCEDURE — G8427 DOCREV CUR MEDS BY ELIG CLIN: HCPCS | Performed by: INTERNAL MEDICINE

## 2024-05-14 PROCEDURE — 99215 OFFICE O/P EST HI 40 MIN: CPT | Performed by: INTERNAL MEDICINE

## 2024-05-14 PROCEDURE — G2211 COMPLEX E/M VISIT ADD ON: HCPCS | Performed by: INTERNAL MEDICINE

## 2024-05-14 RX ORDER — AMLODIPINE BESYLATE 5 MG/1
5 TABLET ORAL DAILY
Qty: 90 TABLET | Refills: 1 | Status: SHIPPED | OUTPATIENT
Start: 2024-05-14

## 2024-05-14 RX ORDER — ATENOLOL 25 MG/1
25 TABLET ORAL DAILY
Qty: 90 TABLET | Refills: 1 | Status: SHIPPED | OUTPATIENT
Start: 2024-05-14

## 2024-05-14 ASSESSMENT — MINI MENTAL STATE EXAM
SAY: I WOULD LIKE YOU TO COUNT BACKWARD FROM 100 BY SEVENS: 4
WHAT FLOOR ARE WE ON [IN FACILITY]?/ WHAT ROOM ARE WE IN [IN HOME]?: 1
WHAT IS TODAY'S DATE?: 1
WHAT YEAR IS THIS?: 1
WHAT COUNTRY ARE WE IN?: 1
SAY: PUT THE PAPER DOWN ON THE FLOOR, SCORE IF PAPER IS PLACED BACK ON FLOOR: 1
SHOW: WRISTWATCH [OBJECT] ASK: WHAT IS THIS CALLED?: 1
SUM ALL MMSE QUESTIONS FOR TOTAL SCORE [OUT OF 30].: 26
ASK THE PERSON IF HE IS RIGHT OR LEFT-HANDED. TAKE A PIECE OF PAPER AND HOLD IT UP IN
FRONT OF THE PERSON. SAY: TAKE THIS PAPER IN YOUR RIGHT/LEFT HAND (WHICHEVER IS NON-
DOMINANT), SCORE IF PAPER IS PICKED UP IN CORRECT HAND.: 0
WHAT IS THE NAME OF THIS BUILDING [IN FACILITY]?/WHAT IS THE STREET ADDRESS OF THIS HOUSE [IN HOME]?: 1
WHICH SEASON IS THIS?: 1
SAY: FOLD THE PAPER IN HALF ONCE WITH BOTH HANDS, SCORE IF PAPER IS CORRECTLY FOLDED IN HALF.: 1
HAND THE PERSON A PENCIL AND PAPER. SAY: WRITE ANY COMPLETE SENTENCE ON THAT
PIECE OF PAPER. (NOTE: THE SENTENCE MUST MAKE SENSE. IGNORE SPELLING ERRORS): 1
WHAT STATE [OR PROVINCE] ARE WE IN?: 1
NOW WHAT WERE THE THREE OBJECTS I ASKED YOU TO REMEMBER?: 1
SAY: I WOULD LIKE YOU TO REPEAT THIS PHRASE AFTER ME: NO IFS, ANDS, OR BUTS.: 1
WHAT CITY/TOWN ARE WE IN?: 1
SHOW: PENCIL [OBJECT] ASK: WHAT IS THIS CALLED?: 1
PLACE DESIGN, ERASER AND PENCIL IN FRONT OF THE PERSON.  SAY:  COPY THIS DESIGN PLEASE.  SHOW: DESIGN. ALLOW: MULTIPLE TRIES. WAIT UNTIL PERSON IS FINISHED AND HANDS IT BACK. SCORE: ONLY FOR DIAGRAM WITH 4-SIDED FIGURE BETWEEN TWO 5-SIDED FIGURES: 1
SAY: READ THE WORDS ON THE PAGE AND THEN DO WHAT IT SAYS. THEN HAND THE PERSON
THE SHEET WITH CLOSE YOUR EYES ON IT. IF THE SUBJECT READS AND DOES NOT CLOSE THEIR EYES, REPEAT UP TO THREE TIMES. SCORE ONLY IF SUBJECT CLOSES EYES.: 1
WHAT MONTH IS THIS?: 1
WHAT DAY OF THE WEEK IS THIS?: 1
SAY: I AM GOING TO NAME THREE OBJECTS. WHEN I AM FINISHED, I WANT YOU TO REPEAT
THEM. REMEMBER WHAT THEY ARE BECAUSE I AM GOING TO ASK YOU TO NAME THEM AGAIN IN
A FEW MINUTES.  SAY THE FOLLOWING WORDS SLOWLY AT 1-SECOND INTERVALS - BALL/ CAR/ MAN [ITERATIONS FOR REPEAT ADMINISTRATION]: 3

## 2024-05-14 ASSESSMENT — PATIENT HEALTH QUESTIONNAIRE - PHQ9
SUM OF ALL RESPONSES TO PHQ9 QUESTIONS 1 & 2: 0
1. LITTLE INTEREST OR PLEASURE IN DOING THINGS: NOT AT ALL
SUM OF ALL RESPONSES TO PHQ QUESTIONS 1-9: 0
2. FEELING DOWN, DEPRESSED OR HOPELESS: NOT AT ALL
SUM OF ALL RESPONSES TO PHQ QUESTIONS 1-9: 0

## 2024-05-14 NOTE — PROGRESS NOTES
19  Ramy Mancini : 1940 Sex: male  Age: 83 y.o.    Chief Complaint   Patient presents with    Numbness     Fingers in both hands going numb.             Patient had an evaluation of lung nodule.  PET scan was done which did not show any active nodules.  Multiple nodules were actually seen on the PET scan.  Patient did work in a pottery previously.  This is probably old granulomatous disease causing these nodules but this will need repeat surveillance in about 6 months.  The patient is complaining of numbness into both of his hands.  He denies any cervical pain.  He really does not have any radicular symptoms.  The patient is also having memory problems according to his wife.  She had written me a note regarding this.  Patient has had no previous history of stroke.  He does have severe peripheral vascular disease and he did previously see vascular surgeon in [place].  He is not happy at that visit and would like another vascular surgery opinion.  The patient has had a previous workup for anemia.  There was no evidence of B12 folate or iron deficiency.  This is probably anemia of chronic disease as he does have some degree of renal insufficiency.  His numbers are not low enough at this time to predicate possible erythropoietin therapies.  The patient's wife also states that he sleeps a lot.  The patient states that he on average he is sleeping less than 7 hours per night.  He will take naps during the day.  He states that he gets up at 4 AM and starts looking at his computer and at daylight he goes outside to weed the garden.  He is not complaining of vascular claudication symptoms but his CT angiography was quite impressive for vascular occlusion to the lower extremity.    Hyperlipidemia    Hypertension    Diabetes    Other  Associated symptoms include numbness.   Neuropathy          Review of Systems  Health Maintenance:  Influenza Vaccination - (10/2023)  Colonoscopy Screening -

## 2024-05-15 DIAGNOSIS — I73.9 PVD (PERIPHERAL VASCULAR DISEASE) (HCC): ICD-10-CM

## 2024-05-15 DIAGNOSIS — R91.1 LUNG NODULE: ICD-10-CM

## 2024-05-15 DIAGNOSIS — D64.9 ANEMIA, UNSPECIFIED TYPE: ICD-10-CM

## 2024-05-15 DIAGNOSIS — I10 ESSENTIAL HYPERTENSION: ICD-10-CM

## 2024-05-15 DIAGNOSIS — R41.3 MEMORY LOSS: ICD-10-CM

## 2024-05-15 DIAGNOSIS — E78.2 MIXED HYPERLIPIDEMIA: ICD-10-CM

## 2024-05-15 DIAGNOSIS — E11.9 TYPE 2 DIABETES MELLITUS WITHOUT COMPLICATION, WITHOUT LONG-TERM CURRENT USE OF INSULIN (HCC): ICD-10-CM

## 2024-05-15 LAB
ALBUMIN: 4.6 G/DL (ref 3.5–5.2)
ALP BLD-CCNC: 67 U/L (ref 40–129)
ALT SERPL-CCNC: 8 U/L (ref 0–40)
ANION GAP SERPL CALCULATED.3IONS-SCNC: 16 MMOL/L (ref 7–16)
AST SERPL-CCNC: 11 U/L (ref 0–39)
BASOPHILS ABSOLUTE: 0.03 K/UL (ref 0–0.2)
BASOPHILS RELATIVE PERCENT: 0 % (ref 0–2)
BILIRUB SERPL-MCNC: <0.2 MG/DL (ref 0–1.2)
BUN BLDV-MCNC: 35 MG/DL (ref 6–23)
CALCIUM SERPL-MCNC: 8.9 MG/DL (ref 8.6–10.2)
CHLORIDE BLD-SCNC: 104 MMOL/L (ref 98–107)
CO2: 18 MMOL/L (ref 22–29)
CREAT SERPL-MCNC: 1.6 MG/DL (ref 0.7–1.2)
EOSINOPHILS ABSOLUTE: 1.07 K/UL (ref 0.05–0.5)
EOSINOPHILS RELATIVE PERCENT: 16 % (ref 0–6)
GFR, ESTIMATED: 42 ML/MIN/1.73M2
GLUCOSE BLD-MCNC: 113 MG/DL (ref 74–99)
HBA1C MFR BLD: 6.2 % (ref 4–5.6)
HCT VFR BLD CALC: 37.8 % (ref 37–54)
HEMOGLOBIN: 11.7 G/DL (ref 12.5–16.5)
IMMATURE GRANULOCYTES %: 1 % (ref 0–5)
IMMATURE GRANULOCYTES ABSOLUTE: 0.04 K/UL (ref 0–0.58)
LYMPHOCYTES ABSOLUTE: 2.96 K/UL (ref 1.5–4)
LYMPHOCYTES RELATIVE PERCENT: 43 % (ref 20–42)
MCH RBC QN AUTO: 28.6 PG (ref 26–35)
MCHC RBC AUTO-ENTMCNC: 31 G/DL (ref 32–34.5)
MCV RBC AUTO: 92.4 FL (ref 80–99.9)
MONOCYTES ABSOLUTE: 0.48 K/UL (ref 0.1–0.95)
MONOCYTES RELATIVE PERCENT: 7 % (ref 2–12)
NEUTROPHILS ABSOLUTE: 2.24 K/UL (ref 1.8–7.3)
NEUTROPHILS RELATIVE PERCENT: 33 % (ref 43–80)
PDW BLD-RTO: 13.8 % (ref 11.5–15)
PLATELET # BLD: 298 K/UL (ref 130–450)
PMV BLD AUTO: 11.3 FL (ref 7–12)
POTASSIUM SERPL-SCNC: 5.1 MMOL/L (ref 3.5–5)
RBC # BLD: 4.09 M/UL (ref 3.8–5.8)
SODIUM BLD-SCNC: 138 MMOL/L (ref 132–146)
TOTAL PROTEIN: 7.4 G/DL (ref 6.4–8.3)
TSH SERPL DL<=0.05 MIU/L-ACNC: 1.55 UIU/ML (ref 0.27–4.2)
WBC # BLD: 6.8 K/UL (ref 4.5–11.5)

## 2024-05-17 LAB
ALBUMIN (CALCULATED): 3.8 G/DL (ref 3.5–4.7)
ALPHA-1-GLOBULIN: 0.2 G/DL (ref 0.2–0.4)
ALPHA-2-GLOBULIN: 1 G/DL (ref 0.5–1)
BETA GLOBULIN: 1.1 G/DL (ref 0.8–1.3)
GAMMA GLOBULIN: 0.7 G/DL (ref 0.7–1.6)
PATHOLOGIST: NORMAL
PROTEIN ELECTROPHORESIS, SERUM: NORMAL
TOTAL PROTEIN: 6.9 G/DL (ref 6.4–8.3)

## 2024-06-05 DIAGNOSIS — E11.9 TYPE 2 DIABETES MELLITUS WITHOUT COMPLICATION, WITHOUT LONG-TERM CURRENT USE OF INSULIN (HCC): ICD-10-CM

## 2024-06-05 DIAGNOSIS — D64.9 ANEMIA, UNSPECIFIED TYPE: ICD-10-CM

## 2024-06-05 DIAGNOSIS — R41.3 MEMORY LOSS: ICD-10-CM

## 2024-06-05 DIAGNOSIS — I10 ESSENTIAL HYPERTENSION: ICD-10-CM

## 2024-06-05 DIAGNOSIS — E78.2 MIXED HYPERLIPIDEMIA: ICD-10-CM

## 2024-06-05 DIAGNOSIS — I73.9 PVD (PERIPHERAL VASCULAR DISEASE) (HCC): ICD-10-CM

## 2024-06-05 DIAGNOSIS — R91.1 LUNG NODULE: ICD-10-CM

## 2024-06-19 ENCOUNTER — OFFICE VISIT (OUTPATIENT)
Dept: VASCULAR SURGERY | Age: 84
End: 2024-06-19
Payer: MEDICARE

## 2024-06-19 VITALS — BODY MASS INDEX: 24.37 KG/M2 | WEIGHT: 165 LBS

## 2024-06-19 DIAGNOSIS — I70.211 ATHEROSCLEROSIS OF NATIVE ARTERY OF RIGHT LOWER EXTREMITY WITH INTERMITTENT CLAUDICATION (HCC): Primary | ICD-10-CM

## 2024-06-19 PROBLEM — I70.219 ATHEROSCLEROSIS OF NATIVE ARTERY OF EXTREMITY WITH INTERMITTENT CLAUDICATION (HCC): Status: ACTIVE | Noted: 2024-06-19

## 2024-06-19 PROCEDURE — G8420 CALC BMI NORM PARAMETERS: HCPCS

## 2024-06-19 PROCEDURE — 99203 OFFICE O/P NEW LOW 30 MIN: CPT

## 2024-06-19 PROCEDURE — G8427 DOCREV CUR MEDS BY ELIG CLIN: HCPCS

## 2024-06-19 PROCEDURE — 1123F ACP DISCUSS/DSCN MKR DOCD: CPT

## 2024-06-19 PROCEDURE — 1036F TOBACCO NON-USER: CPT

## 2024-06-19 RX ORDER — ASCORBIC ACID 500 MG
500 TABLET ORAL DAILY
COMMUNITY

## 2024-06-19 RX ORDER — MAGNESIUM 30 MG
30 TABLET ORAL 2 TIMES DAILY
COMMUNITY

## 2024-06-19 NOTE — PROGRESS NOTES
Vascular Surgery Outpatient Consultation      Chief Complaint   Patient presents with    Consultation     New pt.  Bilateral lower extremities pain and not able to walk       Reason for Consult:  Peripheral vascular disease    Requesting Physician:  Dr. Schmidt    HISTORY OF PRESENT ILLNESS:                The patient is a 83 y.o. male who states he has right lower extremity pain with walking.  The patient states a history of about one year of right leg pain with walking.  The pain starts in the right hip and goes down his leg.  Walking a distance of 1/2 block causes the pain to worsen and the pain resolves with rest.  He denies ulcers or rest pain.     Pt was seen by Dr. Deras in 3/2024 after a CTA with runoff was done showing a right SFA occulusion and incidental pulmonary nodule finding.  Dr. Loyd plan was for R FEA after the pulmonary nodule was worked up.  Pt had a PET scan done showing probable old granulomatous disease causing the nodules but they will repeat the scan in 6 months for surveillance.  Pt was not happy with his visit to Dr. Deras and wanted referred to us for further evaluation.       Pt stopped taking asa but is on a statin.     Past Medical History:        Diagnosis Date    Carotid bruit 01/25/2019    Cataract 07/2011    Wu did surgery    Greater trochanteric bursitis 2019    Hyperlipidemia     Hypertension     Kidney insufficiency 2016    Kidney stones 04/2017    Leg pain     Osteoarthritis     [place]    PVD (posterior vitreous detachment), bilateral     Type 2 diabetes mellitus without complication (HCC)      Past Surgical History:        Procedure Laterality Date    CATARACT REMOVAL Bilateral 07/2011    [facility]    KNEE SURGERY Right     TURP      [place]     Current Medications:   Prior to Admission medications    Medication Sig Start Date End Date Taking? Authorizing Provider   vitamin C (ASCORBIC ACID) 500 MG tablet Take 1 tablet by mouth daily   Yes Provider, MD Zoe

## 2024-08-12 ENCOUNTER — APPOINTMENT (OUTPATIENT)
Dept: CARDIOLOGY | Facility: CLINIC | Age: 84
End: 2024-08-12
Payer: MEDICARE

## 2024-08-16 RX ORDER — SIMVASTATIN 40 MG
40 TABLET ORAL NIGHTLY
Qty: 90 TABLET | Refills: 1 | Status: SHIPPED | OUTPATIENT
Start: 2024-08-16

## 2024-08-16 NOTE — TELEPHONE ENCOUNTER
Last Appointment:  5/14/2024  Future Appointments   Date Time Provider Department Center   9/18/2024 11:00 AM Thomas Schmidt MD [facility]

## 2024-09-09 PROBLEM — E11.9 DIABETES MELLITUS TYPE II, NON INSULIN DEPENDENT (MULTI): Status: ACTIVE | Noted: 2024-09-09

## 2024-09-09 PROBLEM — I73.9 PVD (PERIPHERAL VASCULAR DISEASE) (CMS-HCC): Status: ACTIVE | Noted: 2024-09-09

## 2024-09-09 PROBLEM — N28.9 RENAL INSUFFICIENCY: Status: ACTIVE | Noted: 2024-09-09

## 2024-09-09 PROBLEM — I11.0 BENIGN HYPERTENSIVE HEART DISEASE WITH HEART FAILURE: Status: ACTIVE | Noted: 2024-09-09

## 2024-09-09 PROBLEM — E78.5 HLD (HYPERLIPIDEMIA): Status: ACTIVE | Noted: 2024-09-09

## 2024-09-10 ENCOUNTER — APPOINTMENT (OUTPATIENT)
Dept: CARDIOLOGY | Facility: HOSPITAL | Age: 84
End: 2024-09-10
Payer: MEDICARE

## 2024-09-10 VITALS
DIASTOLIC BLOOD PRESSURE: 78 MMHG | WEIGHT: 162 LBS | HEART RATE: 59 BPM | BODY MASS INDEX: 23.99 KG/M2 | HEIGHT: 69 IN | SYSTOLIC BLOOD PRESSURE: 128 MMHG

## 2024-09-10 DIAGNOSIS — Z01.810 PREOP CARDIOVASCULAR EXAM: ICD-10-CM

## 2024-09-10 DIAGNOSIS — I73.9 PVD (PERIPHERAL VASCULAR DISEASE) (CMS-HCC): Primary | ICD-10-CM

## 2024-09-10 DIAGNOSIS — R94.31 ABNORMAL ECG: ICD-10-CM

## 2024-09-10 PROBLEM — N40.1 BENIGN PROSTATIC HYPERPLASIA WITH URINARY FREQUENCY: Status: ACTIVE | Noted: 2022-09-14

## 2024-09-10 PROBLEM — N17.9 ACUTE RENAL FAILURE SYNDROME (CMS-HCC): Status: ACTIVE | Noted: 2017-04-19

## 2024-09-10 PROBLEM — R35.0 BENIGN PROSTATIC HYPERPLASIA WITH URINARY FREQUENCY: Status: ACTIVE | Noted: 2022-09-14

## 2024-09-10 PROBLEM — I70.219 ATHEROSCLEROSIS OF NATIVE ARTERY OF EXTREMITY WITH INTERMITTENT CLAUDICATION (CMS-HCC): Status: ACTIVE | Noted: 2024-06-19

## 2024-09-10 PROBLEM — E87.5 HYPERKALEMIA: Status: ACTIVE | Noted: 2017-04-19

## 2024-09-10 PROBLEM — Z96.651 HX OF TOTAL KNEE ARTHROPLASTY, RIGHT: Status: ACTIVE | Noted: 2020-10-20

## 2024-09-10 PROBLEM — N18.30 CHRONIC RENAL DISEASE, STAGE III (MULTI): Status: ACTIVE | Noted: 2022-09-14

## 2024-09-10 PROBLEM — R91.1 SOLITARY PULMONARY NODULE: Status: ACTIVE | Noted: 2024-06-05

## 2024-09-10 PROBLEM — M19.90 OSTEOARTHRITIS: Status: ACTIVE | Noted: 2019-06-20

## 2024-09-10 PROBLEM — H02.009 ENTROPION: Status: ACTIVE | Noted: 2023-05-17

## 2024-09-10 PROBLEM — D50.9 IRON DEFICIENCY ANEMIA: Status: ACTIVE | Noted: 2021-02-23

## 2024-09-10 PROBLEM — I10 ESSENTIAL HYPERTENSION: Status: ACTIVE | Noted: 2019-06-20

## 2024-09-10 PROCEDURE — 3074F SYST BP LT 130 MM HG: CPT | Performed by: INTERNAL MEDICINE

## 2024-09-10 PROCEDURE — 93010 ELECTROCARDIOGRAM REPORT: CPT | Performed by: INTERNAL MEDICINE

## 2024-09-10 PROCEDURE — 99203 OFFICE O/P NEW LOW 30 MIN: CPT | Performed by: INTERNAL MEDICINE

## 2024-09-10 PROCEDURE — 99213 OFFICE O/P EST LOW 20 MIN: CPT | Performed by: INTERNAL MEDICINE

## 2024-09-10 PROCEDURE — 1159F MED LIST DOCD IN RCRD: CPT | Performed by: INTERNAL MEDICINE

## 2024-09-10 PROCEDURE — 3078F DIAST BP <80 MM HG: CPT | Performed by: INTERNAL MEDICINE

## 2024-09-10 PROCEDURE — 1036F TOBACCO NON-USER: CPT | Performed by: INTERNAL MEDICINE

## 2024-09-10 PROCEDURE — 93005 ELECTROCARDIOGRAM TRACING: CPT | Performed by: INTERNAL MEDICINE

## 2024-09-10 RX ORDER — ATENOLOL 25 MG/1
25 TABLET ORAL EVERY 24 HOURS
COMMUNITY

## 2024-09-10 RX ORDER — GLYBURIDE 5 MG/1
5 TABLET ORAL EVERY 12 HOURS
COMMUNITY

## 2024-09-10 RX ORDER — ASPIRIN 81 MG/1
81 TABLET ORAL DAILY
COMMUNITY

## 2024-09-10 RX ORDER — DOBUTAMINE HYDROCHLORIDE 100 MG/100ML
5-40 INJECTION INTRAVENOUS CONTINUOUS
OUTPATIENT
Start: 2024-09-10

## 2024-09-10 RX ORDER — GEMFIBROZIL 600 MG/1
600 TABLET, FILM COATED ORAL
COMMUNITY

## 2024-09-10 RX ORDER — MAGNESIUM GLUCONATE 27.5 (500)
1 TABLET ORAL DAILY
COMMUNITY

## 2024-09-10 RX ORDER — METFORMIN HYDROCHLORIDE 500 MG/1
1000 TABLET ORAL EVERY 12 HOURS
COMMUNITY

## 2024-09-10 RX ORDER — FLUTICASONE PROPIONATE 50 MCG
2 SPRAY, SUSPENSION (ML) NASAL DAILY
COMMUNITY
Start: 2023-05-17

## 2024-09-10 RX ORDER — ATROPINE SULFATE 0.1 MG/ML
.25-5 INJECTION INTRAVENOUS ONCE AS NEEDED
OUTPATIENT
Start: 2024-09-10

## 2024-09-10 RX ORDER — VALSARTAN 80 MG/1
1 TABLET ORAL DAILY
COMMUNITY
Start: 2024-03-20

## 2024-09-10 RX ORDER — ASCORBIC ACID 500 MG
1 TABLET ORAL DAILY
COMMUNITY

## 2024-09-10 RX ORDER — TAMSULOSIN HYDROCHLORIDE 0.4 MG/1
0.4 CAPSULE ORAL DAILY
COMMUNITY

## 2024-09-10 RX ORDER — AMLODIPINE BESYLATE 5 MG/1
1 TABLET ORAL DAILY
COMMUNITY
Start: 2024-05-14

## 2024-09-10 RX ORDER — SIMVASTATIN 40 MG/1
1 TABLET, FILM COATED ORAL NIGHTLY
COMMUNITY
Start: 2024-08-16

## 2024-09-10 NOTE — Clinical Note
September 10, 2024       No Recipients    Patient: Ramana Looney   YOB: 1940   Date of Visit: 9/10/2024       Dear Dr. Pedro Recipients:    Thank you for referring Ramana Looney to me for evaluation. Below are my notes for this consultation.  If you have questions, please do not hesitate to call me. I look forward to following your patient along with you.       Sincerely,     Fernando Marques MD      CC:   No Recipients  ______________________________________________________________________________________    Counseling:  The patient was counseled regarding diagnostic results, instructions for management, risk factor reductions, prognosis, patient and family education, impressions, risks and benefits of treatment options and importance of compliance with treatment.      Chief Complaint:  The patient presents today for cardiovascular risk stratification prior to right femoral endarterectomy.     History Of Present Illness:    Ramana Looney is a 83 y.o. male patient whose PMH is significant for hyperlipidemia, HTN, renal insufficiency, PVD and DM type 2. He presents today for cardiovascular risk stratification prior to right femoral endarterectomy.    Past Surgical History:  He has no past surgical history on file.      Social History:  He has no history on file for tobacco use, alcohol use, and drug use.    Family History:  No family history on file.     Allergies:  Patient has no allergy information on record.    Outpatient Medications:  No current outpatient medications     Last Recorded Vitals:  There were no vitals filed for this visit.    Review of Systems   All other systems reviewed and are negative.     Physical Exam:  Constitutional:       Appearance: Healthy appearance. Not in distress.   Neck:      Vascular: No JVR. JVD normal.   Pulmonary:      Effort: Pulmonary effort is normal.      Breath sounds: Normal breath sounds. No wheezing. No rhonchi. No rales.   Chest:      Chest wall: Not tender to  palpatation.   Cardiovascular:      PMI at left midclavicular line. Normal rate. Regular rhythm. Normal S1. Normal S2.       Murmurs: There is no murmur.      No gallop.  No click. No rub.   Pulses:     Intact distal pulses.   Edema:     Peripheral edema absent.   Abdominal:      General: Bowel sounds are normal.      Palpations: Abdomen is soft.      Tenderness: There is no abdominal tenderness.   Musculoskeletal: Normal range of motion.         General: No tenderness. Skin:     General: Skin is warm and dry.   Neurological:      General: No focal deficit present.      Mental Status: Alert and oriented to person, place and time.         Last Cardiology Tests:  02/11/2024 - CTA Abdominal Aorta  1. Abdominal Aorta: Moderate atherosclerotic changes with small chronic-appearing dissection mid infrarenal aorta with aorta measuring 2.4 cm in diameter.    2. Right Lower Extremity: Moderate stenosis proximal right common iliac artery.  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  50% stenosis right superficial femoral artery in its midportion. Trifurcation vessels are patent to the foot and ankle.    3. Left Lower Extremity: Short segment occlusion left common iliac artery just beyond its origin with collaterals supplying the left internal and external iliac arteries which appear patent. Moderate stenosis left common femoral artery and deep femoral artery.  50% stenosis left superficial femoral artery in its distal third.  Small chronic-appearing dissection proximal popliteal artery not appearing to affect flow.  Trifurcation vessels to the foot and ankle.       Lab review: I have personally reviewed the laboratory result(s).    Assessment/Plan  1) Cardiovascular Risk Stratification  Scheduled for right femoral endarterectomy   CTA Abdominal Aorta 02/11/2024 with moderate atherosclerotic changes with small chronic-appearing dissection mid  infrarenal aorta with aorta measuring 2.4 cm in diameter, moderate stenosis proximal right common iliac artery, complete    occlusion of right common femoral artery with collaterals supplying right deep and superficial femoral arteries, 50% stenosis right superficial femoral artery in its midportion, short segment occlusion left common iliac artery just beyond its origin    with collaterals supplying left internal and external iliac arteries, moderate stenosis left common femoral artery and deep femoral artery, 50% stenosis left superficial femoral artery in its distal third, small chronic-appearing dissection proximal    popliteal artery not appearing to affect flow.    Seen by Dr. Blevins 03/2024 with CTA with runoff showing right SFA occulusion and incidental pulmonary nodule finding  Plan was for right femoral endarterectomy following workup of pulmonary nodule   PET scan showed probable old granulomatous disease causing the nodules - repeat scan recommended in 6 months for surveillance  Subsequently seen by Dr. Maria Esther Maldonado for second opinion - recommendations made for right femoral endarterectomy with patch pending cardiac clearance        Scribe Attestation  By signing my name below, I, Akshat Wong   attest that this documentation has been prepared under the direction and in the presence of Fernando Marques MD.

## 2024-09-10 NOTE — PROGRESS NOTES
Counseling:  The patient was counseled regarding diagnostic results, instructions for management, risk factor reductions, prognosis, patient and family education, impressions, risks and benefits of treatment options and importance of compliance with treatment.      Chief Complaint:  The patient presents today for cardiovascular risk stratification prior to right femoral endarterectomy.     History Of Present Illness:    Ramana Looney is a 83 y.o. male patient whose PMH is significant for hyperlipidemia, HTN, renal insufficiency, PVD and DM type 2. He presents today in the company of family for cardiovascular risk stratification prior to right femoral endarterectomy. The patient denies any prior history of heart disease. He denies any CP, chest discomfort or SOB at rest or with exertion. He is currently on amlodipine, atenolol and valsartan for management of HTN, simvastatin for management of hyperlipidemia, and metformin for management of DM. The patient's family history is positive for heart disease in his mother.     Past Surgical History:  He has no past surgical history on file.      Social History:  He reports that he has quit smoking. His smoking use included cigarettes. He has never used smokeless tobacco. He reports current alcohol use. He reports that he does not use drugs.    Family History:  No family history on file.     Allergies:  Ezetimibe-simvastatin, Lovastatin, Rosuvastatin calcium, and Sulfamethoxazole-trimethoprim    Outpatient Medications:  Current Outpatient Medications   Medication Instructions    amLODIPine (Norvasc) 5 mg tablet 1 tablet, oral, Daily    ascorbic acid (Vitamin C) 500 mg tablet 1 tablet, oral, Daily    aspirin 81 mg, oral, Daily    atenolol (TENORMIN) 25 mg, oral, Every 24 hours    fluticasone (Flonase) 50 mcg/actuation nasal spray 2 sprays, Each Nostril, Daily    folic acid/multivit-min/lutein (CENTRUM SILVER ORAL) 1 tablet, oral, Daily    gemfibrozil (LOPID) 600 mg, oral, 2  "times daily before meals    glyBURIDE (DIABETA) 5 mg, oral, Every 12 hours    Lactobacillus acidophilus (PROBIOTIC ORAL) 1 tablet, oral, Daily    magnesium gluconate (Magonate) 27.5 mg magne- sium (500 mg) tablet 1 tablet, oral, Daily    metFORMIN (GLUCOPHAGE) 1,000 mg, oral, Every 12 hours    simvastatin (Zocor) 40 mg tablet 1 tablet, oral, Nightly    tamsulosin (FLOMAX) 0.4 mg, oral, Daily    valsartan (Diovan) 80 mg tablet 1 tablet, oral, Daily        Last Recorded Vitals:  Vitals:    09/10/24 1507   BP: 128/78   Pulse: 59   Weight: 73.5 kg (162 lb)   Height: 1.753 m (5' 9\")       Review of Systems   All other systems reviewed and are negative.     Physical Exam:  Constitutional:       Appearance: Healthy appearance. Not in distress.   Neck:      Vascular: No JVR. JVD normal.   Pulmonary:      Effort: Pulmonary effort is normal.      Breath sounds: Normal breath sounds. No wheezing. No rhonchi. No rales.   Chest:      Chest wall: Not tender to palpatation.   Cardiovascular:      PMI at left midclavicular line. Normal rate. Regular rhythm. Normal S1. Normal S2.       Murmurs: There is no murmur.      No gallop.  No click. No rub.   Pulses:     Intact distal pulses.   Edema:     Peripheral edema absent.   Abdominal:      General: Bowel sounds are normal.      Palpations: Abdomen is soft.      Tenderness: There is no abdominal tenderness.   Musculoskeletal: Normal range of motion.         General: No tenderness. Skin:     General: Skin is warm and dry.   Neurological:      General: No focal deficit present.      Mental Status: Alert and oriented to person, place and time.         Last Cardiology Tests:  02/11/2024 - CTA Abdominal Aorta  1. Abdominal Aorta: Moderate atherosclerotic changes with small chronic-appearing dissection mid infrarenal aorta with aorta measuring 2.4 cm in diameter.    2. Right Lower Extremity: Moderate stenosis proximal right common iliac artery.  Complete occlusion of the right common " femoral artery with collaterals supplying the right deep and superficial femoral arteries.  50% stenosis right superficial femoral artery in its midportion. Trifurcation vessels are patent to the foot and ankle.    3. Left Lower Extremity: Short segment occlusion left common iliac artery just beyond its origin with collaterals supplying the left internal and external iliac arteries which appear patent. Moderate stenosis left common femoral artery and deep femoral artery.  50% stenosis left superficial femoral artery in its distal third.  Small chronic-appearing dissection proximal popliteal artery not appearing to affect flow.  Trifurcation vessels to the foot and ankle.       Lab review: I have personally reviewed the laboratory result(s).    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    Seen by Dr. Blevins 03/2024 with CTA with runoff showing right SFA occulusion and incidental pulmonary nodule finding  Plan was for right femoral endarterectomy following workup of pulmonary nodule    PET scan showed probable old granulomatous disease causing the nodules - repeat scan recommended in 6 months for surveillance.  Subsequently seen by Dr. Maria Esther Maldonado for second opinion - recommendations made for right femoral endarterectomy with patch pending cardiac clearance.  Denies any prior history of heart disease  Denies CP, chest discomfort or SOB at rest or with exertion  FH positive for heart disease in mother - heart attack at age 56  On amlodipine 5 mg daily, atenolol 25 mg daily and valsartan 80 mg daily for management of HTN  On simvastatin 40 mg daily for management of hyperlipidemia  On metformin for management of DM  Check Dobutamine Stress Echo   F/U after testing      Scribe Attestation  By signing my name below, I, Akshat Wong   attest that this documentation has been prepared under the direction and in the presence of Fernando Marques MD.

## 2024-09-10 NOTE — PATIENT INSTRUCTIONS
Continue all current medications as prescribed.  Dr. Marques has ordered a stress test to ensure your heart is getting adequate blood flow and there is no evidence of any blockages within the heart arteries.    Followup with Dr. Marques after the above test.    If you have any questions or cardiac concerns, please call our office at [phone].

## 2024-09-18 ENCOUNTER — OFFICE VISIT (OUTPATIENT)
Dept: FAMILY MEDICINE CLINIC | Age: 84
End: 2024-09-18

## 2024-09-18 VITALS
BODY MASS INDEX: 23.92 KG/M2 | DIASTOLIC BLOOD PRESSURE: 70 MMHG | TEMPERATURE: 97.8 F | WEIGHT: 162 LBS | OXYGEN SATURATION: 98 % | SYSTOLIC BLOOD PRESSURE: 130 MMHG | HEART RATE: 60 BPM

## 2024-09-18 DIAGNOSIS — N18.31 ANEMIA DUE TO STAGE 3A CHRONIC KIDNEY DISEASE (HCC): ICD-10-CM

## 2024-09-18 DIAGNOSIS — M17.0 PRIMARY OSTEOARTHRITIS OF BOTH KNEES: ICD-10-CM

## 2024-09-18 DIAGNOSIS — N18.31 TYPE 2 DIABETES MELLITUS WITH STAGE 3A CHRONIC KIDNEY DISEASE, WITHOUT LONG-TERM CURRENT USE OF INSULIN (HCC): ICD-10-CM

## 2024-09-18 DIAGNOSIS — M17.0 PRIMARY OSTEOARTHRITIS OF BOTH KNEES: Primary | ICD-10-CM

## 2024-09-18 DIAGNOSIS — E11.22 TYPE 2 DIABETES MELLITUS WITH STAGE 3A CHRONIC KIDNEY DISEASE, WITHOUT LONG-TERM CURRENT USE OF INSULIN (HCC): ICD-10-CM

## 2024-09-18 DIAGNOSIS — E11.9 TYPE 2 DIABETES MELLITUS WITHOUT COMPLICATION, WITHOUT LONG-TERM CURRENT USE OF INSULIN (HCC): ICD-10-CM

## 2024-09-18 DIAGNOSIS — N18.31 STAGE 3A CHRONIC KIDNEY DISEASE (HCC): ICD-10-CM

## 2024-09-18 DIAGNOSIS — E78.2 MIXED HYPERLIPIDEMIA: ICD-10-CM

## 2024-09-18 DIAGNOSIS — I10 ESSENTIAL HYPERTENSION: ICD-10-CM

## 2024-09-18 DIAGNOSIS — D63.1 ANEMIA DUE TO STAGE 3A CHRONIC KIDNEY DISEASE (HCC): ICD-10-CM

## 2024-09-18 LAB
ALBUMIN: 4.8 G/DL (ref 3.5–5.2)
ALP BLD-CCNC: 70 U/L (ref 40–129)
ALT SERPL-CCNC: 10 U/L (ref 0–40)
ANION GAP SERPL CALCULATED.3IONS-SCNC: 15 MMOL/L (ref 7–16)
AST SERPL-CCNC: 13 U/L (ref 0–39)
BASOPHILS ABSOLUTE: 0.03 K/UL (ref 0–0.2)
BASOPHILS RELATIVE PERCENT: 1 % (ref 0–2)
BILIRUB SERPL-MCNC: 0.2 MG/DL (ref 0–1.2)
BUN BLDV-MCNC: 32 MG/DL (ref 6–23)
CALCIUM SERPL-MCNC: 9.2 MG/DL (ref 8.6–10.2)
CHLORIDE BLD-SCNC: 107 MMOL/L (ref 98–107)
CHOLESTEROL, TOTAL: 152 MG/DL
CO2: 17 MMOL/L (ref 22–29)
CREAT SERPL-MCNC: 1.6 MG/DL (ref 0.7–1.2)
EOSINOPHILS ABSOLUTE: 0.65 K/UL (ref 0.05–0.5)
EOSINOPHILS RELATIVE PERCENT: 11 % (ref 0–6)
GFR, ESTIMATED: 43 ML/MIN/1.73M2
GLUCOSE BLD-MCNC: 132 MG/DL (ref 74–99)
HBA1C MFR BLD: 6.4 %
HCT VFR BLD CALC: 36.7 % (ref 37–54)
HDLC SERPL-MCNC: 42 MG/DL
HEMOGLOBIN: 11.2 G/DL (ref 12.5–16.5)
IMMATURE GRANULOCYTES %: 1 % (ref 0–5)
IMMATURE GRANULOCYTES ABSOLUTE: 0.05 K/UL (ref 0–0.58)
IRON % SATURATION: 22 % (ref 20–55)
IRON: 86 UG/DL (ref 59–158)
LDL CHOLESTEROL: 91 MG/DL
LYMPHOCYTES ABSOLUTE: 2.12 K/UL (ref 1.5–4)
LYMPHOCYTES RELATIVE PERCENT: 37 % (ref 20–42)
MCH RBC QN AUTO: 27.9 PG (ref 26–35)
MCHC RBC AUTO-ENTMCNC: 30.5 G/DL (ref 32–34.5)
MCV RBC AUTO: 91.5 FL (ref 80–99.9)
MONOCYTES ABSOLUTE: 0.48 K/UL (ref 0.1–0.95)
MONOCYTES RELATIVE PERCENT: 8 % (ref 2–12)
NEUTROPHILS ABSOLUTE: 2.46 K/UL (ref 1.8–7.3)
NEUTROPHILS RELATIVE PERCENT: 43 % (ref 43–80)
PDW BLD-RTO: 14.1 % (ref 11.5–15)
PLATELET # BLD: 323 K/UL (ref 130–450)
PMV BLD AUTO: 11.2 FL (ref 7–12)
POTASSIUM SERPL-SCNC: 5.9 MMOL/L (ref 3.5–5)
RBC # BLD: 4.01 M/UL (ref 3.8–5.8)
SODIUM BLD-SCNC: 139 MMOL/L (ref 132–146)
TOTAL IRON BINDING CAPACITY: 394 UG/DL (ref 250–450)
TOTAL PROTEIN: 7.3 G/DL (ref 6.4–8.3)
TRIGL SERPL-MCNC: 94 MG/DL
VLDLC SERPL CALC-MCNC: 19 MG/DL
WBC # BLD: 5.8 K/UL (ref 4.5–11.5)

## 2024-09-18 RX ORDER — GEMFIBROZIL 600 MG/1
TABLET, FILM COATED ORAL
Qty: 180 TABLET | Refills: 1 | Status: SHIPPED | OUTPATIENT
Start: 2024-09-18

## 2024-09-18 RX ORDER — VALSARTAN 80 MG/1
80 TABLET ORAL DAILY
Qty: 90 TABLET | Refills: 1 | Status: SHIPPED | OUTPATIENT
Start: 2024-09-18

## 2024-09-18 RX ORDER — TAMSULOSIN HYDROCHLORIDE 0.4 MG/1
0.4 CAPSULE ORAL DAILY
Qty: 90 CAPSULE | Refills: 1 | Status: SHIPPED | OUTPATIENT
Start: 2024-09-18

## 2024-09-18 RX ORDER — AMLODIPINE BESYLATE 5 MG/1
5 TABLET ORAL DAILY
Qty: 90 TABLET | Refills: 1 | Status: SHIPPED | OUTPATIENT
Start: 2024-09-18

## 2024-09-19 ENCOUNTER — HOSPITAL ENCOUNTER (OUTPATIENT)
Dept: CARDIOLOGY | Facility: HOSPITAL | Age: 84
Discharge: HOME | End: 2024-09-19
Payer: MEDICARE

## 2024-09-19 DIAGNOSIS — Z01.810 PREOP CARDIOVASCULAR EXAM: ICD-10-CM

## 2024-09-19 DIAGNOSIS — R94.31 ABNORMAL ECG: ICD-10-CM

## 2024-09-19 DIAGNOSIS — I73.9 PVD (PERIPHERAL VASCULAR DISEASE) (CMS-HCC): ICD-10-CM

## 2024-09-19 DIAGNOSIS — N17.9 AKI (ACUTE KIDNEY INJURY) (HCC): Primary | ICD-10-CM

## 2024-09-19 PROCEDURE — 93016 CV STRESS TEST SUPVJ ONLY: CPT | Performed by: INTERNAL MEDICINE

## 2024-09-19 PROCEDURE — 2500000005 HC RX 250 GENERAL PHARMACY W/O HCPCS: Performed by: INTERNAL MEDICINE

## 2024-09-19 PROCEDURE — 2500000004 HC RX 250 GENERAL PHARMACY W/ HCPCS (ALT 636 FOR OP/ED): Performed by: INTERNAL MEDICINE

## 2024-09-19 PROCEDURE — 93017 CV STRESS TEST TRACING ONLY: CPT

## 2024-09-19 PROCEDURE — 93018 CV STRESS TEST I&R ONLY: CPT | Performed by: INTERNAL MEDICINE

## 2024-09-19 PROCEDURE — 93350 STRESS TTE ONLY: CPT | Performed by: INTERNAL MEDICINE

## 2024-09-19 RX ORDER — DOBUTAMINE HYDROCHLORIDE 100 MG/100ML
5-40 INJECTION INTRAVENOUS CONTINUOUS
Status: DISCONTINUED | OUTPATIENT
Start: 2024-09-19 | End: 2024-09-20 | Stop reason: HOSPADM

## 2024-09-19 RX ORDER — ATROPINE SULFATE 0.1 MG/ML
.25-5 INJECTION INTRAVENOUS ONCE AS NEEDED
Status: DISCONTINUED | OUTPATIENT
Start: 2024-09-19 | End: 2024-09-20 | Stop reason: HOSPADM

## 2024-09-19 RX ORDER — METOPROLOL TARTRATE 1 MG/ML
5 INJECTION, SOLUTION INTRAVENOUS ONCE
Status: COMPLETED | OUTPATIENT
Start: 2024-09-19 | End: 2024-09-19

## 2024-09-23 PROBLEM — M70.62 TROCHANTERIC BURSITIS OF LEFT HIP: Status: ACTIVE | Noted: 2022-09-14

## 2024-09-23 PROBLEM — M62.89 HAMSTRING TIGHTNESS: Status: ACTIVE | Noted: 2023-09-27

## 2024-09-23 PROBLEM — M70.60 GREATER TROCHANTERIC BURSITIS: Status: ACTIVE | Noted: 2023-01-18

## 2024-09-23 PROBLEM — R06.2 WHEEZE: Status: ACTIVE | Noted: 2020-02-12

## 2024-09-23 LAB
ANION GAP SERPL CALCULATED.3IONS-SCNC: 9 MEQ/L (ref 4–14)
BASOPHILS ABSOLUTE: 0 K/UL (ref 0–0.2)
BASOPHILS RELATIVE PERCENT: 0.5 % (ref 0–1.5)
BUN BLDV-MCNC: 26 MG/DL (ref 7–25)
CALCIUM SERPL-MCNC: 8.6 MG/DL (ref 8.5–10.5)
CHLORIDE BLD-SCNC: 110 MEQ/L (ref 98–107)
CO2: 18 MEQ/L (ref 21–31)
CREAT SERPL-MCNC: 1.47 MG/DL (ref 0.7–1.3)
CREATININE + EGFR PANEL: 55 ML/MIN
EOSINOPHILS ABSOLUTE: 1 K/UL (ref 0–0.33)
EOSINOPHILS RELATIVE PERCENT: 14.9 % (ref 0–3)
GFR NON-AFRICAN AMERICAN: 46 ML/MIN
GLUCOSE BLD-MCNC: 108 MG/DL (ref 70–99)
HCT VFR BLD CALC: 32.7 % (ref 41–50)
HEMOGLOBIN: 11 G/DL (ref 13.5–16.5)
LYMPHOCYTES ABSOLUTE: 2.4 K/UL (ref 1.1–4.8)
LYMPHOCYTES RELATIVE PERCENT: 35 % (ref 24–44)
MCH RBC QN AUTO: 28 PG (ref 28–34)
MCHC RBC AUTO-ENTMCNC: 33.5 G/DL (ref 33–37)
MCV RBC AUTO: 83.6 FL (ref 80–100)
MONOCYTES ABSOLUTE: 0.5 K/UL (ref 0.2–0.7)
MONOCYTES RELATIVE PERCENT: 6.9 % (ref 3.4–9)
NEUTROPHILS ABSOLUTE: 2.9 K/UL (ref 1.83–8.7)
NEUTROPHILS RELATIVE PERCENT: 42.7 % (ref 40–74)
PDW BLD-RTO: 14.5 % (ref 10.9–14.3)
PLATELET # BLD: 276 K/UL (ref 150–450)
PMV BLD AUTO: 8.6 FL (ref 7.4–10.4)
POTASSIUM SERPL-SCNC: 5.3 MEQ/L (ref 3.6–5)
RBC # BLD: 3.91 M/UL (ref 4.5–5.5)
SODIUM BLD-SCNC: 137 MEQ/L (ref 135–145)
WBC # BLD: 6.8 K/UL (ref 4.5–11)

## 2024-09-24 NOTE — PROGRESS NOTES
Counseling:  The patient was counseled regarding diagnostic results, instructions for management, risk factor reductions, prognosis, patient and family education, impressions, risks and benefits of treatment options and importance of compliance with treatment.      Chief Complaint:  The patient presents today for 2-week followup of preop stress echo.     History Of Present Illness:    Ramana Looney is a 84 y.o. male patient who presents today in the company of family for 2-week followup of preop stress echo. His PMH is significant for hyperlipidemia, HTN, renal insufficiency, PVD and DM type 2. Dobutamine stress echo performed 09/19/2024 was negative for ischemia. Today, the patient states that he is feeling relatively well with no new cardiac complaints.     Past Surgical History:  He has no past surgical history on file.      Social History:  He reports that he has quit smoking. His smoking use included cigarettes. He has never used smokeless tobacco. He reports current alcohol use. He reports that he does not use drugs.    Family History:  No family history on file.     Allergies:  Ezetimibe-simvastatin, Lovastatin, Rosuvastatin calcium, and Sulfamethoxazole-trimethoprim    Outpatient Medications:  Current Outpatient Medications   Medication Instructions    amLODIPine (Norvasc) 5 mg tablet 1 tablet, oral, Daily    ascorbic acid (Vitamin C) 500 mg tablet 1 tablet, oral, Daily    aspirin 81 mg, oral, Daily    atenolol (TENORMIN) 25 mg, oral, Every 24 hours    fluticasone (Flonase) 50 mcg/actuation nasal spray 2 sprays, Each Nostril, Daily    folic acid/multivit-min/lutein (CENTRUM SILVER ORAL) 1 tablet, oral, Daily    gemfibrozil (LOPID) 600 mg, oral, 2 times daily before meals    glyBURIDE (DIABETA) 5 mg, oral, Every 12 hours    Lactobacillus acidophilus (PROBIOTIC ORAL) 1 tablet, oral, Daily    magnesium gluconate (Magonate) 27.5 mg magne- sium (500 mg) tablet 1 tablet, oral, Daily    metFORMIN (GLUCOPHAGE) 1,000  "mg, oral    simvastatin (Zocor) 40 mg tablet 1 tablet, oral, Nightly    tamsulosin (FLOMAX) 0.4 mg, oral, Daily    valsartan (Diovan) 80 mg tablet 1 tablet, oral, Daily        Last Recorded Vitals:  Vitals:    09/26/24 1051   BP: 132/74   BP Location: Left arm   Patient Position: Sitting   BP Cuff Size: Adult   Pulse: 66   Weight: 74.8 kg (165 lb)   Height: 1.753 m (5' 9\")       Review of Systems   All other systems reviewed and are negative.     Physical Exam:  Constitutional:       Appearance: Healthy appearance. Not in distress.   Neck:      Vascular: No JVR. JVD normal.   Pulmonary:      Effort: Pulmonary effort is normal.      Breath sounds: Normal breath sounds. No wheezing. No rhonchi. No rales.   Chest:      Chest wall: Not tender to palpatation.   Cardiovascular:      PMI at left midclavicular line. Normal rate. Regular rhythm. Normal S1. Normal S2.       Murmurs: There is no murmur.      No gallop.  No click. No rub.   Pulses:     Intact distal pulses.   Edema:     Peripheral edema absent.   Abdominal:      General: Bowel sounds are normal.      Palpations: Abdomen is soft.      Tenderness: There is no abdominal tenderness.   Musculoskeletal: Normal range of motion.         General: No tenderness. Skin:     General: Skin is warm and dry.   Neurological:      General: No focal deficit present.      Mental Status: Alert and oriented to person, place and time.         Last Cardiology Tests:  09/19/2024 - Dobutamine Stress Echo  1. Normal global left ventricular systolic function.  2. Adequate level of stress achieved.  3. No clinical or electrocardiographic evidence for ischemia at maximal infusion.  4. There were no stress-induced wall motion abnormalities. This is a negative stress echo test for ischemia.    02/11/2024 - CTA Abdominal Aorta  1. Abdominal Aorta: Moderate atherosclerotic changes with small chronic-appearing dissection mid infrarenal aorta with aorta measuring 2.4 cm in diameter.    2. Right " Lower Extremity: Moderate stenosis proximal right common iliac artery.  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  50% stenosis right superficial femoral artery in its midportion. Trifurcation vessels are patent to the foot and ankle.    3. Left Lower Extremity: Short segment occlusion left common iliac artery just beyond its origin with collaterals supplying the left internal and external iliac arteries which appear patent. Moderate stenosis left common femoral artery and deep femoral artery.  50% stenosis left superficial femoral artery in its distal third.  Small chronic-appearing dissection proximal popliteal artery not appearing to affect flow. Trifurcation vessels to the foot and ankle.       Diagnostic review: I have personally reviewed the result(s) of the Dobutamine Stress Echo.     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    Seen by Dr. Blevins 03/2024 with CTA with runoff  showing right SFA occulusion and incidental pulmonary nodule finding  Plan was for right femoral endarterectomy following workup of pulmonary nodule   PET scan showed probable old granulomatous disease causing the nodules - repeat scan recommended in 6 months for surveillance.  Subsequently seen by Dr. Maria Esther Maldonado for second opinion - recommendations made for right femoral endarterectomy with patch pending cardiac clearance.  Denies any prior history of heart disease  Denies CP, chest discomfort or SOB at rest or with exertion  FH positive for heart disease in mother - heart attack at age 56  On metformin for management of DM  Dobutamine stress echo 09/19/2024 negative for ischemia   Low risk for planned surgery - clearance sent to Dr. Maldonado with copy to PCP (Dr. BRAD Faulkner)  F/U 1 year      Scribe Attestation  By signing my name below, I, Akshat Wong   attest that this documentation has been prepared under the direction and in the presence of Fernando Marques MD.

## 2024-09-25 PROBLEM — E78.5 HYPERLIPIDEMIA: Status: ACTIVE | Noted: 2019-06-20

## 2024-09-25 PROBLEM — E11.22 TYPE 2 DIABETES MELLITUS WITH CHRONIC KIDNEY DISEASE: Status: ACTIVE | Noted: 2022-09-14

## 2024-09-25 PROBLEM — E11.9 TYPE 2 DIABETES MELLITUS WITHOUT COMPLICATION (MULTI): Status: ACTIVE | Noted: 2019-06-20

## 2024-09-25 RX ORDER — METFORMIN HYDROCHLORIDE 1000 MG/1
1000 TABLET ORAL
COMMUNITY
Start: 2024-09-18

## 2024-09-26 ENCOUNTER — OFFICE VISIT (OUTPATIENT)
Dept: CARDIOLOGY | Facility: HOSPITAL | Age: 84
End: 2024-09-26
Payer: MEDICARE

## 2024-09-26 VITALS
DIASTOLIC BLOOD PRESSURE: 74 MMHG | SYSTOLIC BLOOD PRESSURE: 132 MMHG | HEIGHT: 69 IN | WEIGHT: 165 LBS | HEART RATE: 66 BPM | BODY MASS INDEX: 24.44 KG/M2

## 2024-09-26 DIAGNOSIS — I73.9 PVD (PERIPHERAL VASCULAR DISEASE) (CMS-HCC): ICD-10-CM

## 2024-09-26 DIAGNOSIS — R94.31 ABNORMAL ECG: ICD-10-CM

## 2024-09-26 DIAGNOSIS — Z01.810 PREOP CARDIOVASCULAR EXAM: ICD-10-CM

## 2024-09-26 PROCEDURE — 99213 OFFICE O/P EST LOW 20 MIN: CPT | Performed by: INTERNAL MEDICINE

## 2024-09-26 PROCEDURE — 1159F MED LIST DOCD IN RCRD: CPT | Performed by: INTERNAL MEDICINE

## 2024-09-26 PROCEDURE — 1036F TOBACCO NON-USER: CPT | Performed by: INTERNAL MEDICINE

## 2024-09-26 PROCEDURE — 3078F DIAST BP <80 MM HG: CPT | Performed by: INTERNAL MEDICINE

## 2024-09-26 PROCEDURE — 3075F SYST BP GE 130 - 139MM HG: CPT | Performed by: INTERNAL MEDICINE

## 2024-09-26 NOTE — LETTER
September 26, 2024     Maria Esther Maldonado MD  [address] [address]    Patient: Ramana Looney   YOB: 1940   Date of Visit: 9/26/2024       Dear Dr. Maria Esther Maldonado MD:    Thank you for referring Ramana Looney to me for evaluation. Below are my notes for this consultation.  If you have questions, please do not hesitate to call me. I look forward to following your patient along with you.       Sincerely,     Fernando Marques MD      CC: Kelvin Ayoub MD  ______________________________________________________________________________________    Counseling:  The patient was counseled regarding diagnostic results, instructions for management, risk factor reductions, prognosis, patient and family education, impressions, risks and benefits of treatment options and importance of compliance with treatment.      Chief Complaint:  The patient presents today for 2-week followup of preop stress echo.     History Of Present Illness:    Ramana Looney is a 84 y.o. male patient who presents today in the company of family for 2-week followup of preop stress echo. His PMH is significant for hyperlipidemia, HTN, renal insufficiency, PVD and DM type 2. Dobutamine stress echo performed 09/19/2024 was negative for ischemia. Today, the patient states that he is feeling relatively well with no new cardiac complaints.     Past Surgical History:  He has no past surgical history on file.      Social History:  He reports that he has quit smoking. His smoking use included cigarettes. He has never used smokeless tobacco. He reports current alcohol use. He reports that he does not use drugs.    Family History:  No family history on file.     Allergies:  Ezetimibe-simvastatin, Lovastatin, Rosuvastatin calcium, and Sulfamethoxazole-trimethoprim    Outpatient Medications:  Current Outpatient Medications   Medication Instructions   • amLODIPine (Norvasc) 5 mg tablet 1 tablet, oral, Daily   • ascorbic acid (Vitamin C) 500  "mg tablet 1 tablet, oral, Daily   • aspirin 81 mg, oral, Daily   • atenolol (TENORMIN) 25 mg, oral, Every 24 hours   • fluticasone (Flonase) 50 mcg/actuation nasal spray 2 sprays, Each Nostril, Daily   • folic acid/multivit-min/lutein (CENTRUM SILVER ORAL) 1 tablet, oral, Daily   • gemfibrozil (LOPID) 600 mg, oral, 2 times daily before meals   • glyBURIDE (DIABETA) 5 mg, oral, Every 12 hours   • Lactobacillus acidophilus (PROBIOTIC ORAL) 1 tablet, oral, Daily   • magnesium gluconate (Magonate) 27.5 mg magne- sium (500 mg) tablet 1 tablet, oral, Daily   • metFORMIN (GLUCOPHAGE) 1,000 mg, oral   • simvastatin (Zocor) 40 mg tablet 1 tablet, oral, Nightly   • tamsulosin (FLOMAX) 0.4 mg, oral, Daily   • valsartan (Diovan) 80 mg tablet 1 tablet, oral, Daily        Last Recorded Vitals:  Vitals:    09/26/24 1051   BP: 132/74   BP Location: Left arm   Patient Position: Sitting   BP Cuff Size: Adult   Pulse: 66   Weight: 74.8 kg (165 lb)   Height: 1.753 m (5' 9\")       Review of Systems   All other systems reviewed and are negative.     Physical Exam:  Constitutional:       Appearance: Healthy appearance. Not in distress.   Neck:      Vascular: No JVR. JVD normal.   Pulmonary:      Effort: Pulmonary effort is normal.      Breath sounds: Normal breath sounds. No wheezing. No rhonchi. No rales.   Chest:      Chest wall: Not tender to palpatation.   Cardiovascular:      PMI at left midclavicular line. Normal rate. Regular rhythm. Normal S1. Normal S2.       Murmurs: There is no murmur.      No gallop.  No click. No rub.   Pulses:     Intact distal pulses.   Edema:     Peripheral edema absent.   Abdominal:      General: Bowel sounds are normal.      Palpations: Abdomen is soft.      Tenderness: There is no abdominal tenderness.   Musculoskeletal: Normal range of motion.         General: No tenderness. Skin:     General: Skin is warm and dry.   Neurological:      General: No focal deficit present.      Mental Status: Alert and " oriented to person, place and time.         Last Cardiology Tests:  09/19/2024 - Dobutamine Stress Echo  1. Normal global left ventricular systolic function.  2. Adequate level of stress achieved.  3. No clinical or electrocardiographic evidence for ischemia at maximal infusion.  4. There were no stress-induced wall motion abnormalities. This is a negative stress echo test for ischemia.    02/11/2024 - CTA Abdominal Aorta  1. Abdominal Aorta: Moderate atherosclerotic changes with small chronic-appearing dissection mid infrarenal aorta with aorta measuring 2.4 cm in diameter.    2. Right Lower Extremity: Moderate stenosis proximal right common iliac artery.  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  50% stenosis right superficial femoral artery in its midportion. Trifurcation vessels are patent to the foot and ankle.    3. Left Lower Extremity: Short segment occlusion left common iliac artery just beyond its origin with collaterals supplying the left internal and external iliac arteries which appear patent. Moderate stenosis left common femoral artery and deep femoral artery.  50% stenosis left superficial femoral artery in its distal third.  Small chronic-appearing dissection proximal popliteal artery not appearing to affect flow. Trifurcation vessels to the foot and ankle.       Diagnostic review: I have personally reviewed the result(s) of the Dobutamine Stress Echo.     Assessment/Plan  1) Cardiovascular Risk Stratification  On ASA 81 mg daily, amlodipine 5 mg daily, atenolol 25 mg daily, simvastatin 40 mg daily, valsartan 80 mg daily   Scheduled for right femoral endarterectomy   CTA Abdominal Aorta 02/11/2024 with moderate atherosclerotic changes with small chronic-appearing dissection mid infrarenal aorta with aorta measuring 2.4 cm in diameter, moderate stenosis proximal right common iliac artery, complete occlusion of  right common femoral artery with collaterals supplying right deep and superficial femoral arteries, 50% stenosis right superficial femoral artery in its midportion, short segment occlusion left common iliac artery just beyond its origin    with collaterals supplying left internal and external iliac arteries, moderate stenosis left common femoral artery and deep femoral artery, 50% stenosis left superficial femoral artery in its distal third, small chronic-appearing dissection proximal    popliteal artery not appearing to affect flow.    Seen by Dr. Blevins 2024 with CTA with runoff showing right SFA occulusion and incidental pulmonary nodule finding  Plan was for right femoral endarterectomy following workup of pulmonary nodule   PET scan showed probable old granulomatous disease causing the nodules - repeat scan recommended in 6 months for surveillance.  Subsequently seen by Dr. Maria Esther Maldonado for second opinion - recommendations made for right femoral endarterectomy with patch pending cardiac clearance.  Denies any prior history of heart disease  Denies CP, chest discomfort or SOB at rest or with exertion  FH positive for heart disease in mother - heart attack at age 56  On metformin for management of DM  Dobutamine stress echo 2024 negative for ischemia   Low risk for planned surgery - clearance sent to Dr. Maldonado with copy to PCP (Dr. BRAD Faulkner)  F/U 1 year      Scribe Attestation  By signing my name below, I, Akshat Wong   attest that this documentation has been prepared under the direction and in the presence of Fernando Marques MD.                          [address] [address]                   Phone# [phone]              Fax# [phone]      Date: 24    RE: Ramana Looney            : 1940       Surgical/Procedural Clearance for:  Femoral endarterectomy  Patient is at: LOW cardiovascular risk for this INTERMEDIATE risk  procedure.           N/A hold Antiplatelet      N/A hold Anticoagulant                      Is further cardiac workup is needed prior to the procedure?  No     Patient should continue Beta Blocker in the perioperative period.  Yes     Patient should resume antiplatelet/anticoagulation as soon as cleared by surgeon/procedure physician.  Yes       Thank You,    09/26/24 at 11:40 AM - Fernando Marques MD

## 2024-09-26 NOTE — PATIENT INSTRUCTIONS
From a heart standpoint, you are cleared for surgery. A clearance letter has been sent to your surgeon and primary care provider.  Followup with Dr. Marques in 1 year, sooner should any issues or concerns arise before then.     If you have any questions or cardiac concerns, please call our office at [phone].

## 2024-09-27 ENCOUNTER — TELEPHONE (OUTPATIENT)
Dept: VASCULAR SURGERY | Age: 84
End: 2024-09-27

## 2024-10-07 DIAGNOSIS — J41.1 MUCOPURULENT CHRONIC BRONCHITIS (HCC): Primary | ICD-10-CM

## 2024-10-07 DIAGNOSIS — R91.8 PULMONARY NODULES: ICD-10-CM

## 2025-01-01 ENCOUNTER — APPOINTMENT (OUTPATIENT)
Dept: CARDIOLOGY | Facility: HOSPITAL | Age: 85
End: 2025-01-01
Payer: MEDICARE

## 2025-01-01 ENCOUNTER — APPOINTMENT (OUTPATIENT)
Dept: RADIOLOGY | Facility: HOSPITAL | Age: 85
End: 2025-01-01
Payer: MEDICARE

## 2025-01-01 PROCEDURE — 93306 TTE W/DOPPLER COMPLETE: CPT

## 2025-01-01 PROCEDURE — 93005 ELECTROCARDIOGRAM TRACING: CPT

## 2025-01-01 PROCEDURE — 71045 X-RAY EXAM CHEST 1 VIEW: CPT

## 2025-01-01 PROCEDURE — 76770 US EXAM ABDO BACK WALL COMP: CPT

## 2025-01-17 DIAGNOSIS — I10 ESSENTIAL HYPERTENSION: ICD-10-CM

## 2025-01-17 RX ORDER — ATENOLOL 25 MG/1
25 TABLET ORAL DAILY
Qty: 90 TABLET | Refills: 1 | Status: SHIPPED | OUTPATIENT
Start: 2025-01-17

## 2025-01-17 NOTE — TELEPHONE ENCOUNTER
Name of Medication(s) Requested:  Requested Prescriptions     Pending Prescriptions Disp Refills    atenolol (TENORMIN) 25 MG tablet [Pharmacy Med Name: Atenolol Oral Tablet 25 MG] 90 tablet 1     Sig: TAKE ONE TABLET BY MOUTH DAILY       Medication is on current medication list Yes    Dosage and directions were verified? Yes    Quantity verified: 90 day supply     Pharmacy Verified?  Yes    Last Appointment:  9/18/2024    Future appts:  Future Appointments   Date Time Provider Department Center   1/21/2025 10:00 AM Thomas Schmidt MD [facility] DE[facility]        (If no appt send self scheduling link. .REFILLAPPT)  Scheduling request sent?     [] Yes  [x] No    Does patient need updated?  [] Yes  [x] No

## 2025-01-21 ENCOUNTER — OFFICE VISIT (OUTPATIENT)
Dept: FAMILY MEDICINE CLINIC | Age: 85
End: 2025-01-21

## 2025-01-21 VITALS
BODY MASS INDEX: 24.07 KG/M2 | HEART RATE: 56 BPM | TEMPERATURE: 97.2 F | SYSTOLIC BLOOD PRESSURE: 140 MMHG | DIASTOLIC BLOOD PRESSURE: 76 MMHG | WEIGHT: 163 LBS | OXYGEN SATURATION: 99 %

## 2025-01-21 DIAGNOSIS — E11.9 TYPE 2 DIABETES MELLITUS WITHOUT COMPLICATION, WITHOUT LONG-TERM CURRENT USE OF INSULIN (HCC): ICD-10-CM

## 2025-01-21 DIAGNOSIS — N18.31 TYPE 2 DIABETES MELLITUS WITH STAGE 3A CHRONIC KIDNEY DISEASE, WITHOUT LONG-TERM CURRENT USE OF INSULIN (HCC): ICD-10-CM

## 2025-01-21 DIAGNOSIS — M25.551 RIGHT HIP PAIN: ICD-10-CM

## 2025-01-21 DIAGNOSIS — D50.9 IRON DEFICIENCY ANEMIA, UNSPECIFIED IRON DEFICIENCY ANEMIA TYPE: ICD-10-CM

## 2025-01-21 DIAGNOSIS — I70.211 ATHEROSCLEROSIS OF NATIVE ARTERY OF RIGHT LOWER EXTREMITY WITH INTERMITTENT CLAUDICATION (HCC): ICD-10-CM

## 2025-01-21 DIAGNOSIS — I10 ESSENTIAL HYPERTENSION: ICD-10-CM

## 2025-01-21 DIAGNOSIS — E11.22 TYPE 2 DIABETES MELLITUS WITH STAGE 3A CHRONIC KIDNEY DISEASE, WITHOUT LONG-TERM CURRENT USE OF INSULIN (HCC): ICD-10-CM

## 2025-01-21 DIAGNOSIS — N18.31 STAGE 3A CHRONIC KIDNEY DISEASE (HCC): ICD-10-CM

## 2025-01-21 DIAGNOSIS — E78.2 MIXED HYPERLIPIDEMIA: ICD-10-CM

## 2025-01-21 DIAGNOSIS — E11.9 TYPE 2 DIABETES MELLITUS WITHOUT COMPLICATION, WITHOUT LONG-TERM CURRENT USE OF INSULIN (HCC): Primary | ICD-10-CM

## 2025-01-21 LAB
ABSOLUTE RETIC #: 0.09 M/UL
ALBUMIN: 4.9 G/DL (ref 3.5–5.2)
ALP BLD-CCNC: 74 U/L (ref 40–129)
ALT SERPL-CCNC: 10 U/L (ref 0–40)
ANION GAP SERPL CALCULATED.3IONS-SCNC: 18 MMOL/L (ref 7–16)
AST SERPL-CCNC: 15 U/L (ref 0–39)
BASOPHILS ABSOLUTE: 0.03 K/UL (ref 0–0.2)
BASOPHILS RELATIVE PERCENT: 1 % (ref 0–2)
BILIRUB SERPL-MCNC: <0.2 MG/DL (ref 0–1.2)
BUN BLDV-MCNC: 32 MG/DL (ref 6–23)
CALCIUM SERPL-MCNC: 8.9 MG/DL (ref 8.6–10.2)
CHLORIDE BLD-SCNC: 113 MMOL/L (ref 98–107)
CHOLESTEROL, TOTAL: 159 MG/DL
CO2: 13 MMOL/L (ref 22–29)
CREAT SERPL-MCNC: 1.7 MG/DL (ref 0.7–1.2)
EOSINOPHILS ABSOLUTE: 0.81 K/UL (ref 0.05–0.5)
EOSINOPHILS RELATIVE PERCENT: 13 % (ref 0–6)
GFR, ESTIMATED: 41 ML/MIN/1.73M2
GLUCOSE BLD-MCNC: 91 MG/DL (ref 74–99)
HBA1C MFR BLD: 6.3 %
HCT VFR BLD CALC: 34.6 % (ref 37–54)
HDLC SERPL-MCNC: 40 MG/DL
HEMOGLOBIN: 10.7 G/DL (ref 12.5–16.5)
IMMATURE GRANULOCYTES %: 1 % (ref 0–5)
IMMATURE GRANULOCYTES ABSOLUTE: 0.05 K/UL (ref 0–0.58)
IMMATURE RETIC FRACT: 9.9 % (ref 2.3–13.4)
IRON % SATURATION: 28 % (ref 20–55)
IRON: 127 UG/DL (ref 59–158)
LDL CHOLESTEROL: 93 MG/DL
LYMPHOCYTES ABSOLUTE: 2.38 K/UL (ref 1.5–4)
LYMPHOCYTES RELATIVE PERCENT: 39 % (ref 20–42)
MCH RBC QN AUTO: 28.2 PG (ref 26–35)
MCHC RBC AUTO-ENTMCNC: 30.9 G/DL (ref 32–34.5)
MCV RBC AUTO: 91.3 FL (ref 80–99.9)
MONOCYTES ABSOLUTE: 0.48 K/UL (ref 0.1–0.95)
MONOCYTES RELATIVE PERCENT: 8 % (ref 2–12)
NEUTROPHILS ABSOLUTE: 2.33 K/UL (ref 1.8–7.3)
NEUTROPHILS RELATIVE PERCENT: 38 % (ref 43–80)
PDW BLD-RTO: 13.8 % (ref 11.5–15)
PLATELET, FLUORESCENCE: 291 K/UL (ref 130–450)
PMV BLD AUTO: 11.8 FL (ref 7–12)
POTASSIUM SERPL-SCNC: 6.4 MMOL/L (ref 3.5–5)
RBC # BLD: 3.79 M/UL (ref 3.8–5.8)
RETIC HEMOGLOBIN: 31.3 PG (ref 28.2–36.6)
RETICULOCYTE COUNT PCT: 2.4 % (ref 0.4–1.9)
SODIUM BLD-SCNC: 144 MMOL/L (ref 132–146)
TOTAL IRON BINDING CAPACITY: 455 UG/DL (ref 250–450)
TOTAL PROTEIN: 7.5 G/DL (ref 6.4–8.3)
TRIGL SERPL-MCNC: 132 MG/DL
VLDLC SERPL CALC-MCNC: 26 MG/DL
WBC # BLD: 6.1 K/UL (ref 4.5–11.5)

## 2025-01-21 RX ORDER — AMLODIPINE BESYLATE 5 MG/1
5 TABLET ORAL DAILY
Qty: 90 TABLET | Refills: 1 | Status: SHIPPED | OUTPATIENT
Start: 2025-01-21

## 2025-01-21 RX ORDER — VALSARTAN 80 MG/1
80 TABLET ORAL DAILY
Qty: 90 TABLET | Refills: 1 | Status: SHIPPED | OUTPATIENT
Start: 2025-01-21

## 2025-01-21 RX ORDER — GEMFIBROZIL 600 MG/1
TABLET, FILM COATED ORAL
Qty: 180 TABLET | Refills: 1 | Status: SHIPPED | OUTPATIENT
Start: 2025-01-21

## 2025-01-21 RX ORDER — TAMSULOSIN HYDROCHLORIDE 0.4 MG/1
0.4 CAPSULE ORAL DAILY
Qty: 90 CAPSULE | Refills: 1 | Status: SHIPPED | OUTPATIENT
Start: 2025-01-21

## 2025-01-21 RX ORDER — SIMVASTATIN 40 MG
40 TABLET ORAL NIGHTLY
Qty: 90 TABLET | Refills: 1 | Status: SHIPPED | OUTPATIENT
Start: 2025-01-21

## 2025-01-21 NOTE — PROGRESS NOTES
19  Ramy Mancini : 1940 Sex: male  Age: 84 y.o.    No chief complaint on file.          Patient is complaining of right hip pain.  This is worse with ambulation especially for prolonged ambulation.  He is not having neurogenic claudication.  Occasionally if he walks long enough he does have some vascular claudication but this is minimal and he states is actually improving.  Patient remains very physically active.  He did see pulmonary medicine and they did clear him for any type of vascular surgery.  He also saw cardiology and had stress testing done which was negative and was cleared from a cardiac standpoint for surgery.  He did not get an x-ray of the right hip yet.  He would like to go to Dr. Arriaza to have the hip replaced if that is going to be contemplated.  At this point in time he is hesitant to either have the hip replaced or to have the vascular surgery.  It sounds from his history that these things are actually improving a bit.  Patient is not having any cardiac or respiratory symptoms currently.  He denies any hemoptysis or cough.  He denies fever, chills, sweats.  He does have chronic renal insufficiency and this will need to be rechecked today.  He does have anemia of chronic disease probably related to his renal insufficiency.  This also will be checked today.  The patient also has a follow-up appointment with pulmonary medicine in the spring to repeat the CT of the chest to follow-up pulmonary nodule.    Hyperlipidemia    Hypertension    Diabetes    Other  Associated symptoms include numbness.   Neuropathy          Review of Systems   Neurological:  Positive for numbness.     Health Maintenance:  Influenza Vaccination - (10/2023)  Colonoscopy Screening - (2014)  Colonoscopy - (2009)  Colonoscopy - (2014)  Colonoscopy -  [place],  [place] diverticular dx next -SEE REPORT  Prostate Exam - ()SCOLERI  Psa Test - ()  Physical Exam

## 2025-01-29 ENCOUNTER — TELEPHONE (OUTPATIENT)
Dept: FAMILY MEDICINE CLINIC | Age: 85
End: 2025-01-29

## 2025-01-29 NOTE — TELEPHONE ENCOUNTER
Advised Shahana that Dr Schmidt stated that it is reasonable to follow through with this surgery.  She will let Ramy know.   Consent (Near Eyelid Margin)/Introductory Paragraph: The rationale for Mohs was explained to the patient and consent was obtained. The risks, benefits and alternatives to therapy were discussed in detail. Specifically, the risks of ectropion or eyelid deformity, infection, scarring, bleeding, prolonged wound healing, incomplete removal, allergy to anesthesia, nerve injury and recurrence were addressed. Prior to the procedure, the treatment site was clearly identified and confirmed by the patient. All components of Universal Protocol/PAUSE Rule completed.

## 2025-01-29 NOTE — TELEPHONE ENCOUNTER
Shahana is asking about Jina's upcomming surgery. She is asking if you think he should go through with it?

## 2025-01-29 NOTE — TELEPHONE ENCOUNTER
Dr Schmidt's Comment Back To ME:        He has been cleared by both pulmonary medicine and cardiology.  I think it is reasonable because he does have vascular claudication in that leg.

## 2025-04-19 DIAGNOSIS — E11.9 TYPE 2 DIABETES MELLITUS WITHOUT COMPLICATION, WITHOUT LONG-TERM CURRENT USE OF INSULIN (HCC): ICD-10-CM

## 2025-04-21 NOTE — TELEPHONE ENCOUNTER
Last Appointment:  1/21/2025  Future Appointments   Date Time Provider Department Center   5/21/2025 11:30 AM Thomas Schmidt MD [facility] DEP     90 days pended

## 2025-04-28 ENCOUNTER — PATIENT MESSAGE (OUTPATIENT)
Dept: FAMILY MEDICINE CLINIC | Age: 85
End: 2025-04-28

## 2025-04-28 DIAGNOSIS — E11.9 TYPE 2 DIABETES MELLITUS WITHOUT COMPLICATION, WITHOUT LONG-TERM CURRENT USE OF INSULIN (HCC): ICD-10-CM

## 2025-05-06 ENCOUNTER — OFFICE VISIT (OUTPATIENT)
Dept: PRIMARY CARE CLINIC | Age: 85
End: 2025-05-06

## 2025-05-06 VITALS
OXYGEN SATURATION: 98 % | TEMPERATURE: 98.1 F | BODY MASS INDEX: 22.74 KG/M2 | DIASTOLIC BLOOD PRESSURE: 52 MMHG | WEIGHT: 154 LBS | HEART RATE: 53 BPM | SYSTOLIC BLOOD PRESSURE: 100 MMHG

## 2025-05-06 DIAGNOSIS — N18.31 TYPE 2 DIABETES MELLITUS WITH STAGE 3A CHRONIC KIDNEY DISEASE, WITHOUT LONG-TERM CURRENT USE OF INSULIN (HCC): Primary | ICD-10-CM

## 2025-05-06 DIAGNOSIS — R19.7 DIARRHEA OF PRESUMED INFECTIOUS ORIGIN: ICD-10-CM

## 2025-05-06 DIAGNOSIS — R11.0 NAUSEA: ICD-10-CM

## 2025-05-06 DIAGNOSIS — N17.9 ACUTE KIDNEY INJURY: ICD-10-CM

## 2025-05-06 DIAGNOSIS — E86.0 DEHYDRATION: ICD-10-CM

## 2025-05-06 DIAGNOSIS — E11.22 TYPE 2 DIABETES MELLITUS WITH STAGE 3A CHRONIC KIDNEY DISEASE, WITHOUT LONG-TERM CURRENT USE OF INSULIN (HCC): Primary | ICD-10-CM

## 2025-05-06 DIAGNOSIS — R14.0 ABDOMINAL DISTENTION: ICD-10-CM

## 2025-05-06 LAB
A/G RATIO: 1.7 RATIO (ref 1.1–2.2)
ALBUMIN: 3.3 G/DL (ref 3.5–5)
ALP BLD-CCNC: 387 U/L (ref 42–121)
ALT SERPL-CCNC: 148 U/L (ref 7–52)
ANION GAP SERPL CALCULATED.3IONS-SCNC: 7 MEQ/L (ref 4–14)
AST SERPL-CCNC: 151 U/L (ref 10–41)
BILIRUB SERPL-MCNC: 3.4 MG/DL (ref 0.3–1.5)
BUN BLDV-MCNC: 91 MG/DL (ref 7–25)
CALCIUM SERPL-MCNC: 8.2 MG/DL (ref 8.5–10.5)
CHLORIDE BLD-SCNC: 110 MEQ/L (ref 98–107)
CO2: 15 MEQ/L (ref 21–31)
CREAT SERPL-MCNC: 2.96 MG/DL (ref 0.7–1.3)
CREATININE + EGFR PANEL: 25 ML/MIN
GFR NON-AFRICAN AMERICAN: 20 ML/MIN
GLOBULIN: 1.9 G/DL (ref 1.9–3.9)
GLUCOSE BLD-MCNC: 168 MG/DL (ref 70–99)
HBA1C MFR BLD: 5.7 %
POTASSIUM SERPL-SCNC: 5.8 MEQ/L (ref 3.6–5)
POTASSIUM, URINE: 38 MEQ/L
SODIUM BLD-SCNC: 132 MEQ/L (ref 135–145)
SODIUM URINE: 23 MEQ/L
TOTAL PROTEIN: 5.2 G/DL (ref 6.2–8)

## 2025-05-06 RX ORDER — ASPIRIN 81 MG/1
81 TABLET ORAL DAILY
COMMUNITY

## 2025-05-06 SDOH — ECONOMIC STABILITY: FOOD INSECURITY: WITHIN THE PAST 12 MONTHS, THE FOOD YOU BOUGHT JUST DIDN'T LAST AND YOU DIDN'T HAVE MONEY TO GET MORE.: NEVER TRUE

## 2025-05-06 SDOH — ECONOMIC STABILITY: FOOD INSECURITY: WITHIN THE PAST 12 MONTHS, YOU WORRIED THAT YOUR FOOD WOULD RUN OUT BEFORE YOU GOT MONEY TO BUY MORE.: NEVER TRUE

## 2025-05-06 ASSESSMENT — PATIENT HEALTH QUESTIONNAIRE - PHQ9
SUM OF ALL RESPONSES TO PHQ QUESTIONS 1-9: 2
SUM OF ALL RESPONSES TO PHQ QUESTIONS 1-9: 2
1. LITTLE INTEREST OR PLEASURE IN DOING THINGS: SEVERAL DAYS
2. FEELING DOWN, DEPRESSED OR HOPELESS: SEVERAL DAYS
SUM OF ALL RESPONSES TO PHQ QUESTIONS 1-9: 2
SUM OF ALL RESPONSES TO PHQ QUESTIONS 1-9: 2

## 2025-05-06 NOTE — PROGRESS NOTES
19  Ramy Mancini : 1940 Sex: male  Age: 84 y.o.    No chief complaint on file.          Patient had severe diarrhea starting about a week ago.  He then developed nausea with very poor oral intake.  No other sick contacts including his wife.  Denies fever, chills, sweats.  Denies any shortness of breath or chest pain.  He has been slightly lightheaded.  Denies any syncope or near syncope.  Blood pressure is low here.  Denies any blood or mucus in the stool.  Denies abdominal pain but does admit to distention.    Hyperlipidemia    Hypertension    Diabetes    Other  Associated symptoms include numbness.   Neuropathy          Review of Systems   Neurological:  Positive for numbness.     Health Maintenance:  Influenza Vaccination - (10/2023)  Colonoscopy Screening - (2014)  Colonoscopy - (2009)  Colonoscopy - (2014)  Colonoscopy -  [place],  [place] diverticular dx next -SEE REPORT  Prostate Exam - ()SCOLERI  Psa Test - ()  Physical Exam 2024  Rectal Exam - (2014)  Zoster Vaccine - ()  Pneumonia Vaccination - ()  Eye Exam -referred to Dr. Larry 2023  Prevnar - (3/18/2015)  Urine Microalbumin - (2023)  Dilated Eye Exam -as above  Diabetic Foot Exam - 2024  COVID VACCINE 2/2021 x 3 booster 10/23  1. Noninsulin requiring diabetes.  2. Hyperlipidemia.  3. Hypertension.  4. Osteoarthritis. SHANNON Arriaza 2020  5. CATARACT- [facility] DID SURGERY   6. KIDNEY INSUFFICIENCY   7. KIDNEY STONES--  8. DELONTE- ?? [facility]   9. CAROTID BRUIT-US ORDERED 19  10. LEFT GREATER TROCHANTERIC BUSITIS 19  11. PVD BY CT   12. Left greater trochanteric bursitis 2022/ 2022 injection therapy  13. Wheeze/cough- PFT ordered 2023  14. Entropion- referred to [facility] 2023  15.  Microalbuminuria May 2023  16.  Tight hamstring 2023  17.  Peripheral vascular disease with abnormal ankle-brachial index.  Further

## 2025-05-07 LAB
ANION GAP SERPL CALCULATED.3IONS-SCNC: 7 MEQ/L (ref 4–14)
BUN BLDV-MCNC: 91 MG/DL (ref 7–25)
CALCIUM SERPL-MCNC: 7.7 MG/DL (ref 8.5–10.5)
CHLORIDE BLD-SCNC: 114 MEQ/L (ref 98–107)
CO2: 14 MEQ/L (ref 21–31)
CREAT SERPL-MCNC: 3.06 MG/DL (ref 0.7–1.3)
CREATININE + EGFR PANEL: 24 ML/MIN
GFR NON-AFRICAN AMERICAN: 20 ML/MIN
GLUCOSE BLD-MCNC: 145 MG/DL (ref 70–99)
OSMOLALITY URINE: 443 MOSM/KG (ref 500–800)
POTASSIUM SERPL-SCNC: 5.9 MEQ/L (ref 3.6–5)
SODIUM BLD-SCNC: 135 MEQ/L (ref 135–145)

## 2025-05-09 LAB
HBV SURFACE AB TITR SER: NON REACTIVE {TITER}
HEP B S AGB SURF AG: NEGATIVE
HEPATITIS B CORE TOTAL ANTIBODY: NEGATIVE
HEPATITIS C ANTIBODY: NORMAL
HEPATITIS C VIRUS (HCV) IGG AB: NON REACTIVE
Lab: NORMAL

## 2025-05-16 LAB
LEFT VENTRICULAR EJECTION FRACTION MODE: NORMAL
LV EF: NORMAL %

## 2025-05-17 ENCOUNTER — HOSPITAL ENCOUNTER (INPATIENT)
Facility: HOSPITAL | Age: 85
End: 2025-05-17
Attending: INTERNAL MEDICINE | Admitting: INTERNAL MEDICINE
Payer: MEDICARE

## 2025-05-17 DIAGNOSIS — R57.9 SHOCK (MULTI): Primary | ICD-10-CM

## 2025-05-17 DIAGNOSIS — I95.9 HYPOTENSION, UNSPECIFIED: ICD-10-CM

## 2025-05-17 LAB
APTT PPP: 29 SECONDS (ref 26–36)
CA-I BLD-SCNC: 1.07 MMOL/L (ref 1.1–1.33)
EST. AVERAGE GLUCOSE BLD GHB EST-MCNC: 108 MG/DL
HBA1C MFR BLD: 5.4 % (ref ?–5.7)
INR PPP: 1.4 (ref 0.9–1.1)
PROTHROMBIN TIME: 15.9 SECONDS (ref 9.8–12.4)

## 2025-05-17 PROCEDURE — 86850 RBC ANTIBODY SCREEN: CPT

## 2025-05-17 PROCEDURE — 85730 THROMBOPLASTIN TIME PARTIAL: CPT

## 2025-05-17 PROCEDURE — 85027 COMPLETE CBC AUTOMATED: CPT

## 2025-05-17 PROCEDURE — 84484 ASSAY OF TROPONIN QUANT: CPT

## 2025-05-17 PROCEDURE — 2500000001 HC RX 250 WO HCPCS SELF ADMINISTERED DRUGS (ALT 637 FOR MEDICARE OP)

## 2025-05-17 PROCEDURE — 71045 X-RAY EXAM CHEST 1 VIEW: CPT | Performed by: RADIOLOGY

## 2025-05-17 PROCEDURE — 36415 COLL VENOUS BLD VENIPUNCTURE: CPT

## 2025-05-17 PROCEDURE — 82728 ASSAY OF FERRITIN: CPT

## 2025-05-17 PROCEDURE — 86923 COMPATIBILITY TEST ELECTRIC: CPT

## 2025-05-17 PROCEDURE — 84100 ASSAY OF PHOSPHORUS: CPT

## 2025-05-17 PROCEDURE — 99291 CRITICAL CARE FIRST HOUR: CPT | Performed by: INTERNAL MEDICINE

## 2025-05-17 PROCEDURE — 2020000001 HC ICU ROOM DAILY

## 2025-05-17 PROCEDURE — 2500000004 HC RX 250 GENERAL PHARMACY W/ HCPCS (ALT 636 FOR OP/ED): Mod: JZ

## 2025-05-17 PROCEDURE — 87040 BLOOD CULTURE FOR BACTERIA: CPT

## 2025-05-17 PROCEDURE — 82330 ASSAY OF CALCIUM: CPT

## 2025-05-17 PROCEDURE — 86256 FLUORESCENT ANTIBODY TITER: CPT

## 2025-05-17 PROCEDURE — 83718 ASSAY OF LIPOPROTEIN: CPT

## 2025-05-17 PROCEDURE — 82390 ASSAY OF CERULOPLASMIN: CPT

## 2025-05-17 PROCEDURE — 83036 HEMOGLOBIN GLYCOSYLATED A1C: CPT

## 2025-05-17 PROCEDURE — 83735 ASSAY OF MAGNESIUM: CPT

## 2025-05-17 PROCEDURE — 83605 ASSAY OF LACTIC ACID: CPT

## 2025-05-17 PROCEDURE — 85007 BL SMEAR W/DIFF WBC COUNT: CPT

## 2025-05-17 PROCEDURE — 87075 CULTR BACTERIA EXCEPT BLOOD: CPT

## 2025-05-17 PROCEDURE — 83550 IRON BINDING TEST: CPT

## 2025-05-17 PROCEDURE — 80053 COMPREHEN METABOLIC PANEL: CPT

## 2025-05-17 PROCEDURE — 86038 ANTINUCLEAR ANTIBODIES: CPT

## 2025-05-17 PROCEDURE — 83540 ASSAY OF IRON: CPT

## 2025-05-17 PROCEDURE — 82103 ALPHA-1-ANTITRYPSIN TOTAL: CPT

## 2025-05-17 RX ORDER — VANCOMYCIN HYDROCHLORIDE 1 G/20ML
INJECTION, POWDER, LYOPHILIZED, FOR SOLUTION INTRAVENOUS DAILY PRN
Status: DISCONTINUED | OUTPATIENT
Start: 2025-05-17 | End: 2025-05-20

## 2025-05-17 RX ORDER — ASPIRIN 81 MG/1
81 TABLET ORAL DAILY
Status: DISCONTINUED | OUTPATIENT
Start: 2025-05-18 | End: 2025-05-17

## 2025-05-17 RX ORDER — HEPARIN SODIUM 5000 [USP'U]/ML
5000 INJECTION, SOLUTION INTRAVENOUS; SUBCUTANEOUS EVERY 8 HOURS
Status: DISCONTINUED | OUTPATIENT
Start: 2025-05-17 | End: 2025-05-20

## 2025-05-17 RX ORDER — TAMSULOSIN HYDROCHLORIDE 0.4 MG/1
0.4 CAPSULE ORAL DAILY
Status: DISCONTINUED | OUTPATIENT
Start: 2025-05-18 | End: 2025-05-20

## 2025-05-17 RX ORDER — LACTULOSE 10 G/15ML
20 SOLUTION ORAL EVERY 8 HOURS
Status: DISCONTINUED | OUTPATIENT
Start: 2025-05-17 | End: 2025-05-20

## 2025-05-17 RX ORDER — ALBUTEROL SULFATE 0.83 MG/ML
2.5 SOLUTION RESPIRATORY (INHALATION) EVERY 4 HOURS PRN
Status: DISCONTINUED | OUTPATIENT
Start: 2025-05-17 | End: 2025-05-21 | Stop reason: HOSPADM

## 2025-05-17 RX ORDER — MEROPENEM AND SODIUM CHLORIDE 1 G/50ML
1 INJECTION, SOLUTION INTRAVENOUS EVERY 8 HOURS
Status: DISCONTINUED | OUTPATIENT
Start: 2025-05-17 | End: 2025-05-18

## 2025-05-17 RX ADMIN — LACTULOSE 20 G: 10 SOLUTION ORAL at 23:19

## 2025-05-17 RX ADMIN — MEROPENEM AND SODIUM CHLORIDE 1 G: 1 INJECTION, SOLUTION INTRAVENOUS at 23:11

## 2025-05-17 RX ADMIN — VANCOMYCIN HYDROCHLORIDE 1500 MG: 1.5 INJECTION, POWDER, LYOPHILIZED, FOR SOLUTION INTRAVENOUS at 23:19

## 2025-05-17 SDOH — ECONOMIC STABILITY: TRANSPORTATION INSECURITY: IN THE PAST 12 MONTHS, HAS LACK OF TRANSPORTATION KEPT YOU FROM MEDICAL APPOINTMENTS OR FROM GETTING MEDICATIONS?: NO

## 2025-05-17 SDOH — SOCIAL STABILITY: SOCIAL INSECURITY: DO YOU FEEL ANYONE HAS EXPLOITED OR TAKEN ADVANTAGE OF YOU FINANCIALLY OR OF YOUR PERSONAL PROPERTY?: NO

## 2025-05-17 SDOH — ECONOMIC STABILITY: HOUSING INSECURITY: IN THE LAST 12 MONTHS, WAS THERE A TIME WHEN YOU WERE NOT ABLE TO PAY THE MORTGAGE OR RENT ON TIME?: NO

## 2025-05-17 SDOH — SOCIAL STABILITY: SOCIAL INSECURITY: WERE YOU ABLE TO COMPLETE ALL THE BEHAVIORAL HEALTH SCREENINGS?: YES

## 2025-05-17 SDOH — SOCIAL STABILITY: SOCIAL INSECURITY: HAS ANYONE EVER THREATENED TO HURT YOUR FAMILY OR YOUR PETS?: NO

## 2025-05-17 SDOH — SOCIAL STABILITY: SOCIAL INSECURITY: ABUSE: ADULT

## 2025-05-17 SDOH — SOCIAL STABILITY: SOCIAL INSECURITY
WITHIN THE LAST YEAR, HAVE YOU BEEN RAPED OR FORCED TO HAVE ANY KIND OF SEXUAL ACTIVITY BY YOUR PARTNER OR EX-PARTNER?: NO

## 2025-05-17 SDOH — ECONOMIC STABILITY: INCOME INSECURITY: IN THE PAST 12 MONTHS HAS THE ELECTRIC, GAS, OIL, OR WATER COMPANY THREATENED TO SHUT OFF SERVICES IN YOUR HOME?: NO

## 2025-05-17 SDOH — SOCIAL STABILITY: SOCIAL INSECURITY: WITHIN THE LAST YEAR, HAVE YOU BEEN HUMILIATED OR EMOTIONALLY ABUSED IN OTHER WAYS BY YOUR PARTNER OR EX-PARTNER?: NO

## 2025-05-17 SDOH — SOCIAL STABILITY: SOCIAL INSECURITY: WITHIN THE LAST YEAR, HAVE YOU BEEN AFRAID OF YOUR PARTNER OR EX-PARTNER?: NO

## 2025-05-17 SDOH — SOCIAL STABILITY: SOCIAL INSECURITY: HAVE YOU HAD THOUGHTS OF HARMING ANYONE ELSE?: NO

## 2025-05-17 SDOH — SOCIAL STABILITY: SOCIAL INSECURITY: ARE THERE ANY APPARENT SIGNS OF INJURIES/BEHAVIORS THAT COULD BE RELATED TO ABUSE/NEGLECT?: NO

## 2025-05-17 SDOH — ECONOMIC STABILITY: FOOD INSECURITY: HOW HARD IS IT FOR YOU TO PAY FOR THE VERY BASICS LIKE FOOD, HOUSING, MEDICAL CARE, AND HEATING?: NOT HARD AT ALL

## 2025-05-17 SDOH — SOCIAL STABILITY: SOCIAL INSECURITY
WITHIN THE LAST YEAR, HAVE YOU BEEN KICKED, HIT, SLAPPED, OR OTHERWISE PHYSICALLY HURT BY YOUR PARTNER OR EX-PARTNER?: NO

## 2025-05-17 SDOH — ECONOMIC STABILITY: FOOD INSECURITY: WITHIN THE PAST 12 MONTHS, YOU WORRIED THAT YOUR FOOD WOULD RUN OUT BEFORE YOU GOT THE MONEY TO BUY MORE.: NEVER TRUE

## 2025-05-17 SDOH — ECONOMIC STABILITY: HOUSING INSECURITY: AT ANY TIME IN THE PAST 12 MONTHS, WERE YOU HOMELESS OR LIVING IN A SHELTER (INCLUDING NOW)?: NO

## 2025-05-17 SDOH — ECONOMIC STABILITY: HOUSING INSECURITY: IN THE PAST 12 MONTHS, HOW MANY TIMES HAVE YOU MOVED WHERE YOU WERE LIVING?: 0

## 2025-05-17 SDOH — SOCIAL STABILITY: SOCIAL INSECURITY: DO YOU FEEL UNSAFE GOING BACK TO THE PLACE WHERE YOU ARE LIVING?: NO

## 2025-05-17 SDOH — ECONOMIC STABILITY: FOOD INSECURITY: WITHIN THE PAST 12 MONTHS, THE FOOD YOU BOUGHT JUST DIDN'T LAST AND YOU DIDN'T HAVE MONEY TO GET MORE.: NEVER TRUE

## 2025-05-17 SDOH — SOCIAL STABILITY: SOCIAL INSECURITY: ARE YOU OR HAVE YOU BEEN THREATENED OR ABUSED PHYSICALLY, EMOTIONALLY, OR SEXUALLY BY ANYONE?: NO

## 2025-05-17 SDOH — SOCIAL STABILITY: SOCIAL INSECURITY: DOES ANYONE TRY TO KEEP YOU FROM HAVING/CONTACTING OTHER FRIENDS OR DOING THINGS OUTSIDE YOUR HOME?: NO

## 2025-05-17 ASSESSMENT — LIFESTYLE VARIABLES
SKIP TO QUESTIONS 9-10: 1
AUDIT-C TOTAL SCORE: 2
HOW OFTEN DO YOU HAVE A DRINK CONTAINING ALCOHOL: 2-4 TIMES A MONTH
HOW OFTEN DO YOU HAVE 6 OR MORE DRINKS ON ONE OCCASION: NEVER
HOW MANY STANDARD DRINKS CONTAINING ALCOHOL DO YOU HAVE ON A TYPICAL DAY: 1 OR 2
AUDIT-C TOTAL SCORE: 2

## 2025-05-17 ASSESSMENT — COGNITIVE AND FUNCTIONAL STATUS - GENERAL
MOBILITY SCORE: 24
DAILY ACTIVITIY SCORE: 24
PATIENT BASELINE BEDBOUND: NO

## 2025-05-17 ASSESSMENT — ACTIVITIES OF DAILY LIVING (ADL)
LACK_OF_TRANSPORTATION: NO
HEARING - LEFT EAR: FUNCTIONAL
TOILETING: INDEPENDENT
PATIENT'S MEMORY ADEQUATE TO SAFELY COMPLETE DAILY ACTIVITIES?: YES
JUDGMENT_ADEQUATE_SAFELY_COMPLETE_DAILY_ACTIVITIES: YES
HEARING - RIGHT EAR: FUNCTIONAL
WALKS IN HOME: INDEPENDENT
ADEQUATE_TO_COMPLETE_ADL: YES
BATHING: INDEPENDENT
DRESSING YOURSELF: INDEPENDENT
FEEDING YOURSELF: INDEPENDENT
GROOMING: INDEPENDENT

## 2025-05-17 ASSESSMENT — PAIN SCALES - GENERAL: PAINLEVEL_OUTOF10: 0 - NO PAIN

## 2025-05-17 ASSESSMENT — PATIENT HEALTH QUESTIONNAIRE - PHQ9
1. LITTLE INTEREST OR PLEASURE IN DOING THINGS: NOT AT ALL
2. FEELING DOWN, DEPRESSED OR HOPELESS: NOT AT ALL
SUM OF ALL RESPONSES TO PHQ9 QUESTIONS 1 & 2: 0

## 2025-05-17 ASSESSMENT — PAIN - FUNCTIONAL ASSESSMENT: PAIN_FUNCTIONAL_ASSESSMENT: 0-10

## 2025-05-17 ASSESSMENT — COLUMBIA-SUICIDE SEVERITY RATING SCALE - C-SSRS
2. HAVE YOU ACTUALLY HAD ANY THOUGHTS OF KILLING YOURSELF?: NO
1. IN THE PAST MONTH, HAVE YOU WISHED YOU WERE DEAD OR WISHED YOU COULD GO TO SLEEP AND NOT WAKE UP?: NO
6. HAVE YOU EVER DONE ANYTHING, STARTED TO DO ANYTHING, OR PREPARED TO DO ANYTHING TO END YOUR LIFE?: NO

## 2025-05-18 LAB
A1AT SERPL NEPH-MCNC: 135 MG/DL (ref 84–218)
ABO GROUP (TYPE) IN BLOOD: NORMAL
ABO GROUP (TYPE) IN BLOOD: NORMAL
ALBUMIN SERPL BCP-MCNC: 3 G/DL (ref 3.4–5)
ALBUMIN SERPL BCP-MCNC: 3.3 G/DL (ref 3.4–5)
ALP SERPL-CCNC: 318 U/L (ref 33–136)
ALP SERPL-CCNC: 351 U/L (ref 33–136)
ALT SERPL W P-5'-P-CCNC: 453 U/L (ref 10–52)
ALT SERPL W P-5'-P-CCNC: 493 U/L (ref 10–52)
AMMONIA PLAS-SCNC: 46 UMOL/L (ref 16–53)
ANION GAP BLDV CALCULATED.4IONS-SCNC: 17 MMOL/L (ref 10–25)
ANION GAP BLDV CALCULATED.4IONS-SCNC: 18 MMOL/L (ref 10–25)
ANION GAP SERPL CALC-SCNC: 22 MMOL/L (ref 10–20)
ANION GAP SERPL CALC-SCNC: 24 MMOL/L (ref 10–20)
ANTIBODY SCREEN: NORMAL
APTT PPP: 29 SECONDS (ref 26–36)
AST SERPL W P-5'-P-CCNC: 448 U/L (ref 9–39)
AST SERPL W P-5'-P-CCNC: 498 U/L (ref 9–39)
BACTERIA BLD CULT: NORMAL
BACTERIA BLD CULT: NORMAL
BASE EXCESS BLDV CALC-SCNC: -5.2 MMOL/L (ref -2–3)
BASE EXCESS BLDV CALC-SCNC: -6.8 MMOL/L (ref -2–3)
BASOPHILS # BLD AUTO: 0 X10*3/UL (ref 0–0.1)
BASOPHILS # BLD AUTO: 0 X10*3/UL (ref 0–0.1)
BASOPHILS # BLD MANUAL: 0 X10*3/UL (ref 0–0.1)
BASOPHILS NFR BLD AUTO: 0 %
BASOPHILS NFR BLD AUTO: 0 %
BASOPHILS NFR BLD MANUAL: 0 %
BILIRUB DIRECT SERPL-MCNC: 6.5 MG/DL (ref 0–0.3)
BILIRUB SERPL-MCNC: 9.9 MG/DL (ref 0–1.2)
BILIRUB SERPL-MCNC: 9.9 MG/DL (ref 0–1.2)
BLASTS # BLD MANUAL: 0 X10*3/UL
BLASTS NFR BLD MANUAL: 0 %
BLOOD EXPIRATION DATE: NORMAL
BODY TEMPERATURE: 37 DEGREES CELSIUS
BODY TEMPERATURE: 37 DEGREES CELSIUS
BUN SERPL-MCNC: 67 MG/DL (ref 6–23)
BUN SERPL-MCNC: 69 MG/DL (ref 6–23)
CA-I BLDV-SCNC: 1.11 MMOL/L (ref 1.1–1.33)
CA-I BLDV-SCNC: 1.12 MMOL/L (ref 1.1–1.33)
CALCIUM SERPL-MCNC: 8.1 MG/DL (ref 8.6–10.6)
CALCIUM SERPL-MCNC: 8.3 MG/DL (ref 8.6–10.6)
CARDIAC TROPONIN I PNL SERPL HS: 1337 NG/L (ref 0–53)
CARDIAC TROPONIN I PNL SERPL HS: 1391 NG/L (ref 0–53)
CARDIAC TROPONIN I PNL SERPL HS: 1459 NG/L (ref 0–53)
CARDIAC TROPONIN I PNL SERPL HS: 1665 NG/L (ref 0–53)
CARDIAC TROPONIN I PNL SERPL HS: 1670 NG/L (ref 0–53)
CARDIAC TROPONIN I PNL SERPL HS: 1671 NG/L (ref 0–53)
CERULOPLASMIN SERPL-MCNC: 41 MG/DL (ref 20–60)
CHLORIDE BLDV-SCNC: 103 MMOL/L (ref 98–107)
CHLORIDE BLDV-SCNC: 103 MMOL/L (ref 98–107)
CHLORIDE SERPL-SCNC: 101 MMOL/L (ref 98–107)
CHLORIDE SERPL-SCNC: 98 MMOL/L (ref 98–107)
CHOLEST SERPL-MCNC: 246 MG/DL (ref 0–199)
CHOLESTEROL/HDL RATIO: 43.9
CLOT INIT KAO IND P HEP NEUT BLD RES TEG: 7.7 MIN (ref 4.6–9.1)
CO2 SERPL-SCNC: 21 MMOL/L (ref 21–32)
CO2 SERPL-SCNC: 21 MMOL/L (ref 21–32)
CREAT SERPL-MCNC: 6.33 MG/DL (ref 0.5–1.3)
CREAT SERPL-MCNC: 6.6 MG/DL (ref 0.5–1.3)
DISPENSE STATUS: NORMAL
EGFRCR SERPLBLD CKD-EPI 2021: 8 ML/MIN/1.73M*2
EGFRCR SERPLBLD CKD-EPI 2021: 8 ML/MIN/1.73M*2
EOSINOPHIL # BLD AUTO: 0.01 X10*3/UL (ref 0–0.4)
EOSINOPHIL # BLD AUTO: 0.02 X10*3/UL (ref 0–0.4)
EOSINOPHIL # BLD MANUAL: 0 X10*3/UL (ref 0–0.4)
EOSINOPHIL NFR BLD AUTO: 0.1 %
EOSINOPHIL NFR BLD AUTO: 0.2 %
EOSINOPHIL NFR BLD MANUAL: 0 %
ERYTHROCYTE [DISTWIDTH] IN BLOOD BY AUTOMATED COUNT: 22.5 % (ref 11.5–14.5)
ERYTHROCYTE [DISTWIDTH] IN BLOOD BY AUTOMATED COUNT: 23.1 % (ref 11.5–14.5)
ERYTHROCYTE [DISTWIDTH] IN BLOOD BY AUTOMATED COUNT: 23.9 % (ref 11.5–14.5)
FERRITIN SERPL-MCNC: 155 NG/ML (ref 20–300)
FIBRINOGEN PPP-MCNC: 108 MG/DL (ref 200–400)
GLUCOSE BLD MANUAL STRIP-MCNC: 141 MG/DL (ref 74–99)
GLUCOSE BLD MANUAL STRIP-MCNC: 146 MG/DL (ref 74–99)
GLUCOSE BLD MANUAL STRIP-MCNC: 147 MG/DL (ref 74–99)
GLUCOSE BLD MANUAL STRIP-MCNC: 151 MG/DL (ref 74–99)
GLUCOSE BLD MANUAL STRIP-MCNC: 151 MG/DL (ref 74–99)
GLUCOSE BLDV-MCNC: 150 MG/DL (ref 74–99)
GLUCOSE BLDV-MCNC: 153 MG/DL (ref 74–99)
GLUCOSE SERPL-MCNC: 154 MG/DL (ref 74–99)
GLUCOSE SERPL-MCNC: 230 MG/DL (ref 74–99)
HCO3 BLDV-SCNC: 18.5 MMOL/L (ref 22–26)
HCO3 BLDV-SCNC: 19.9 MMOL/L (ref 22–26)
HCT VFR BLD AUTO: 19.9 % (ref 41–52)
HCT VFR BLD AUTO: 22.2 % (ref 41–52)
HCT VFR BLD AUTO: 23.9 % (ref 41–52)
HCT VFR BLD EST: 20 % (ref 41–52)
HCT VFR BLD EST: 25 % (ref 41–52)
HDLC SERPL-MCNC: 5.6 MG/DL
HGB BLD-MCNC: 6.4 G/DL (ref 13.5–17.5)
HGB BLD-MCNC: 6.9 G/DL (ref 13.5–17.5)
HGB BLD-MCNC: 7.5 G/DL (ref 13.5–17.5)
HGB BLDV-MCNC: 6.5 G/DL (ref 13.5–17.5)
HGB BLDV-MCNC: 8.4 G/DL (ref 13.5–17.5)
HYPOCHROMIA BLD QL SMEAR: ABNORMAL
IMM GRANULOCYTES # BLD AUTO: 0.14 X10*3/UL (ref 0–0.5)
IMM GRANULOCYTES # BLD AUTO: 0.15 X10*3/UL (ref 0–0.5)
IMM GRANULOCYTES # BLD AUTO: 0.15 X10*3/UL (ref 0–0.5)
IMM GRANULOCYTES NFR BLD AUTO: 1.7 % (ref 0–0.9)
IMM GRANULOCYTES NFR BLD AUTO: 1.8 % (ref 0–0.9)
IMM GRANULOCYTES NFR BLD AUTO: 1.9 % (ref 0–0.9)
INHALED O2 CONCENTRATION: 60 %
INHALED O2 CONCENTRATION: 80 %
INR PPP: 1.4 (ref 0.9–1.1)
INR PPP: 1.5 (ref 0.9–1.1)
IRON SATN MFR SERPL: 11 % (ref 25–45)
IRON SERPL-MCNC: 19 UG/DL (ref 35–150)
LACTATE BLDV-SCNC: 2.6 MMOL/L (ref 0.4–2)
LACTATE BLDV-SCNC: 3 MMOL/L (ref 0.4–2)
LACTATE SERPL-SCNC: 4.9 MMOL/L (ref 0.4–2)
LACTATE SERPL-SCNC: 5.8 MMOL/L (ref 0.4–2)
LDLC SERPL CALC-MCNC: 201 MG/DL
LYMPHOCYTES # BLD AUTO: 0.59 X10*3/UL (ref 0.8–3)
LYMPHOCYTES # BLD AUTO: 0.69 X10*3/UL (ref 0.8–3)
LYMPHOCYTES # BLD MANUAL: 0.76 X10*3/UL (ref 0.8–3)
LYMPHOCYTES NFR BLD AUTO: 7.3 %
LYMPHOCYTES NFR BLD AUTO: 8.3 %
LYMPHOCYTES NFR BLD MANUAL: 9.5 %
LYS KAO IND CLT 30M P MA LENFR BL RESTEG: 4 % (ref 0–2.6)
MA KAOLIN+TF BLD RES TEG: 47 MM (ref 52–70)
MA TF IND+IIB-IIIA INH BLD RES TEG: 16 MM (ref 15–32)
MAGNESIUM SERPL-MCNC: 2.96 MG/DL (ref 1.6–2.4)
MAGNESIUM SERPL-MCNC: 3.03 MG/DL (ref 1.6–2.4)
MCH RBC QN AUTO: 27.8 PG (ref 26–34)
MCH RBC QN AUTO: 28.2 PG (ref 26–34)
MCH RBC QN AUTO: 29 PG (ref 26–34)
MCHC RBC AUTO-ENTMCNC: 31.1 G/DL (ref 32–36)
MCHC RBC AUTO-ENTMCNC: 31.4 G/DL (ref 32–36)
MCHC RBC AUTO-ENTMCNC: 32.2 G/DL (ref 32–36)
MCV RBC AUTO: 88 FL (ref 80–100)
MCV RBC AUTO: 89 FL (ref 80–100)
MCV RBC AUTO: 93 FL (ref 80–100)
METAMYELOCYTES # BLD MANUAL: 0.07 X10*3/UL
METAMYELOCYTES NFR BLD MANUAL: 0.9 %
MONOCYTES # BLD AUTO: 0.62 X10*3/UL (ref 0.05–0.8)
MONOCYTES # BLD AUTO: 0.65 X10*3/UL (ref 0.05–0.8)
MONOCYTES # BLD MANUAL: 0.14 X10*3/UL (ref 0.05–0.8)
MONOCYTES NFR BLD AUTO: 7.7 %
MONOCYTES NFR BLD AUTO: 7.8 %
MONOCYTES NFR BLD MANUAL: 1.7 %
MRSA DNA SPEC QL NAA+PROBE: NOT DETECTED
MYELOCYTES # BLD MANUAL: 0.07 X10*3/UL
MYELOCYTES NFR BLD MANUAL: 0.9 %
NEUTROPHILS # BLD AUTO: 6.67 X10*3/UL (ref 1.6–5.5)
NEUTROPHILS # BLD AUTO: 6.86 X10*3/UL (ref 1.6–5.5)
NEUTROPHILS # BLD MANUAL: 6.82 X10*3/UL (ref 1.6–5.5)
NEUTROPHILS NFR BLD AUTO: 82 %
NEUTROPHILS NFR BLD AUTO: 83.1 %
NEUTS BAND # BLD MANUAL: 0 X10*3/UL (ref 0–0.5)
NEUTS BAND NFR BLD MANUAL: 0 %
NEUTS SEG # BLD MANUAL: 6.82 X10*3/UL (ref 1.6–5)
NEUTS SEG NFR BLD MANUAL: 85.3 %
NON HDL CHOLESTEROL: 240 MG/DL (ref 0–149)
NRBC BLD MANUAL-RTO: 0 % (ref 0–0)
NRBC BLD-RTO: 1.9 /100 WBCS (ref 0–0)
NRBC BLD-RTO: 1.9 /100 WBCS (ref 0–0)
NRBC BLD-RTO: 2 /100 WBCS (ref 0–0)
OXYHGB MFR BLDV: 78 % (ref 45–75)
OXYHGB MFR BLDV: 80 % (ref 45–75)
PCO2 BLDV: 35 MM HG (ref 41–51)
PCO2 BLDV: 36 MM HG (ref 41–51)
PH BLDV: 7.33 PH (ref 7.33–7.43)
PH BLDV: 7.35 PH (ref 7.33–7.43)
PHOSPHATE SERPL-MCNC: 4.8 MG/DL (ref 2.5–4.9)
PHOSPHATE SERPL-MCNC: 5.1 MG/DL (ref 2.5–4.9)
PLASMA CELLS # BLD MANUAL: 0 X10*3/UL
PLASMA CELLS NFR BLD MANUAL: 0 %
PLATELET # BLD AUTO: 85 X10*3/UL (ref 150–450)
PLATELET # BLD AUTO: 88 X10*3/UL (ref 150–450)
PLATELET # BLD AUTO: 95 X10*3/UL (ref 150–450)
PO2 BLDV: 51 MM HG (ref 35–45)
PO2 BLDV: 54 MM HG (ref 35–45)
POTASSIUM BLDV-SCNC: 3.9 MMOL/L (ref 3.5–5.3)
POTASSIUM BLDV-SCNC: 4.4 MMOL/L (ref 3.5–5.3)
POTASSIUM SERPL-SCNC: 3.7 MMOL/L (ref 3.5–5.3)
POTASSIUM SERPL-SCNC: 3.8 MMOL/L (ref 3.5–5.3)
PRODUCT BLOOD TYPE: 6200
PRODUCT CODE: NORMAL
PROMYELOCYTES # BLD MANUAL: 0 X10*3/UL
PROMYELOCYTES NFR BLD MANUAL: 0 %
PROT SERPL-MCNC: 4.1 G/DL (ref 6.4–8.2)
PROT SERPL-MCNC: 4.4 G/DL (ref 6.4–8.2)
PROTHROMBIN TIME: 15.7 SECONDS (ref 9.8–12.4)
PROTHROMBIN TIME: 16.2 SECONDS (ref 9.8–12.4)
RBC # BLD AUTO: 2.27 X10*6/UL (ref 4.5–5.9)
RBC # BLD AUTO: 2.38 X10*6/UL (ref 4.5–5.9)
RBC # BLD AUTO: 2.7 X10*6/UL (ref 4.5–5.9)
RBC MORPH BLD: ABNORMAL
RH FACTOR (ANTIGEN D): NORMAL
RH FACTOR (ANTIGEN D): NORMAL
SAO2 % BLDV: 80 % (ref 45–75)
SAO2 % BLDV: 82 % (ref 45–75)
SODIUM BLDV-SCNC: 135 MMOL/L (ref 136–145)
SODIUM BLDV-SCNC: 136 MMOL/L (ref 136–145)
SODIUM SERPL-SCNC: 139 MMOL/L (ref 136–145)
SODIUM SERPL-SCNC: 140 MMOL/L (ref 136–145)
TIBC SERPL-MCNC: 177 UG/DL (ref 240–445)
TOTAL CELLS COUNTED BLD: 116
TRIGL SERPL-MCNC: 196 MG/DL (ref 0–149)
UIBC SERPL-MCNC: 158 UG/DL (ref 110–370)
UNIT ABO: NORMAL
UNIT NUMBER: NORMAL
UNIT RH: NORMAL
UNIT VOLUME: 350
VANCOMYCIN SERPL-MCNC: 14.8 UG/ML (ref 5–20)
VARIANT LYMPHS # BLD MANUAL: 0.14 X10*3/UL (ref 0–0.3)
VARIANT LYMPHS NFR BLD: 1.7 %
VLDL: 39 MG/DL (ref 0–40)
WBC # BLD AUTO: 8 X10*3/UL (ref 4.4–11.3)
WBC # BLD AUTO: 8 X10*3/UL (ref 4.4–11.3)
WBC # BLD AUTO: 8.4 X10*3/UL (ref 4.4–11.3)
XM INTEP: NORMAL

## 2025-05-18 PROCEDURE — 82248 BILIRUBIN DIRECT: CPT

## 2025-05-18 PROCEDURE — 93010 ELECTROCARDIOGRAM REPORT: CPT | Performed by: INTERNAL MEDICINE

## 2025-05-18 PROCEDURE — 85384 FIBRINOGEN ACTIVITY: CPT

## 2025-05-18 PROCEDURE — 85025 COMPLETE CBC W/AUTO DIFF WBC: CPT

## 2025-05-18 PROCEDURE — 2020000001 HC ICU ROOM DAILY

## 2025-05-18 PROCEDURE — 84484 ASSAY OF TROPONIN QUANT: CPT

## 2025-05-18 PROCEDURE — 82435 ASSAY OF BLOOD CHLORIDE: CPT

## 2025-05-18 PROCEDURE — 2500000004 HC RX 250 GENERAL PHARMACY W/ HCPCS (ALT 636 FOR OP/ED): Mod: JZ

## 2025-05-18 PROCEDURE — 2500000001 HC RX 250 WO HCPCS SELF ADMINISTERED DRUGS (ALT 637 FOR MEDICARE OP)

## 2025-05-18 PROCEDURE — 86901 BLOOD TYPING SEROLOGIC RH(D): CPT

## 2025-05-18 PROCEDURE — 87641 MR-STAPH DNA AMP PROBE: CPT

## 2025-05-18 PROCEDURE — 99221 1ST HOSP IP/OBS SF/LOW 40: CPT | Performed by: INTERNAL MEDICINE

## 2025-05-18 PROCEDURE — 37799 UNLISTED PX VASCULAR SURGERY: CPT

## 2025-05-18 PROCEDURE — P9016 RBC LEUKOCYTES REDUCED: HCPCS

## 2025-05-18 PROCEDURE — 82140 ASSAY OF AMMONIA: CPT

## 2025-05-18 PROCEDURE — 85610 PROTHROMBIN TIME: CPT

## 2025-05-18 PROCEDURE — 83735 ASSAY OF MAGNESIUM: CPT

## 2025-05-18 PROCEDURE — 86803 HEPATITIS C AB TEST: CPT

## 2025-05-18 PROCEDURE — 2500000005 HC RX 250 GENERAL PHARMACY W/O HCPCS

## 2025-05-18 PROCEDURE — 2500000002 HC RX 250 W HCPCS SELF ADMINISTERED DRUGS (ALT 637 FOR MEDICARE OP, ALT 636 FOR OP/ED)

## 2025-05-18 PROCEDURE — 3E033XZ INTRODUCTION OF VASOPRESSOR INTO PERIPHERAL VEIN, PERCUTANEOUS APPROACH: ICD-10-PCS

## 2025-05-18 PROCEDURE — 80048 BASIC METABOLIC PNL TOTAL CA: CPT

## 2025-05-18 PROCEDURE — 82043 UR ALBUMIN QUANTITATIVE: CPT

## 2025-05-18 PROCEDURE — 81001 URINALYSIS AUTO W/SCOPE: CPT

## 2025-05-18 PROCEDURE — 82947 ASSAY GLUCOSE BLOOD QUANT: CPT

## 2025-05-18 PROCEDURE — 36415 COLL VENOUS BLD VENIPUNCTURE: CPT

## 2025-05-18 PROCEDURE — 36430 TRANSFUSION BLD/BLD COMPNT: CPT

## 2025-05-18 PROCEDURE — 99291 CRITICAL CARE FIRST HOUR: CPT

## 2025-05-18 PROCEDURE — 84300 ASSAY OF URINE SODIUM: CPT

## 2025-05-18 PROCEDURE — 99222 1ST HOSP IP/OBS MODERATE 55: CPT | Performed by: INTERNAL MEDICINE

## 2025-05-18 PROCEDURE — 84100 ASSAY OF PHOSPHORUS: CPT

## 2025-05-18 PROCEDURE — 86900 BLOOD TYPING SEROLOGIC ABO: CPT

## 2025-05-18 PROCEDURE — 83605 ASSAY OF LACTIC ACID: CPT

## 2025-05-18 PROCEDURE — 86704 HEP B CORE ANTIBODY TOTAL: CPT

## 2025-05-18 PROCEDURE — 5A0945A ASSISTANCE WITH RESPIRATORY VENTILATION, 24-96 CONSECUTIVE HOURS, HIGH NASAL FLOW/VELOCITY: ICD-10-PCS | Performed by: INTERNAL MEDICINE

## 2025-05-18 PROCEDURE — 2500000005 HC RX 250 GENERAL PHARMACY W/O HCPCS: Performed by: INTERNAL MEDICINE

## 2025-05-18 PROCEDURE — 82330 ASSAY OF CALCIUM: CPT

## 2025-05-18 PROCEDURE — 87086 URINE CULTURE/COLONY COUNT: CPT

## 2025-05-18 PROCEDURE — 80202 ASSAY OF VANCOMYCIN: CPT

## 2025-05-18 PROCEDURE — 84295 ASSAY OF SERUM SODIUM: CPT

## 2025-05-18 PROCEDURE — 86705 HEP B CORE ANTIBODY IGM: CPT

## 2025-05-18 RX ORDER — POTASSIUM CHLORIDE 20 MEQ/1
20 TABLET, EXTENDED RELEASE ORAL ONCE
Status: COMPLETED | OUTPATIENT
Start: 2025-05-18 | End: 2025-05-18

## 2025-05-18 RX ORDER — METOPROLOL TARTRATE 25 MG/1
12.5 TABLET, FILM COATED ORAL ONCE
Status: DISCONTINUED | OUTPATIENT
Start: 2025-05-18 | End: 2025-05-18

## 2025-05-18 RX ORDER — INSULIN LISPRO 100 [IU]/ML
0-5 INJECTION, SOLUTION INTRAVENOUS; SUBCUTANEOUS EVERY 4 HOURS
Status: DISCONTINUED | OUTPATIENT
Start: 2025-05-18 | End: 2025-05-20

## 2025-05-18 RX ORDER — NOREPINEPHRINE BITARTRATE 0.06 MG/ML
0-1 INJECTION, SOLUTION INTRAVENOUS CONTINUOUS
Status: DISCONTINUED | OUTPATIENT
Start: 2025-05-18 | End: 2025-05-18

## 2025-05-18 RX ORDER — NOREPINEPHRINE BITARTRATE/D5W 8 MG/250ML
0-.2 PLASTIC BAG, INJECTION (ML) INTRAVENOUS CONTINUOUS
Status: DISCONTINUED | OUTPATIENT
Start: 2025-05-18 | End: 2025-05-18

## 2025-05-18 RX ORDER — MEROPENEM 500 MG/1
500 INJECTION, POWDER, FOR SOLUTION INTRAVENOUS
Status: DISCONTINUED | OUTPATIENT
Start: 2025-05-18 | End: 2025-05-20

## 2025-05-18 RX ORDER — VANCOMYCIN HYDROCHLORIDE 500 MG/100ML
500 INJECTION, SOLUTION INTRAVENOUS ONCE
Status: COMPLETED | OUTPATIENT
Start: 2025-05-18 | End: 2025-05-18

## 2025-05-18 RX ORDER — NOREPINEPHRINE BITARTRATE/D5W 8 MG/250ML
0-1 PLASTIC BAG, INJECTION (ML) INTRAVENOUS CONTINUOUS
Status: DISCONTINUED | OUTPATIENT
Start: 2025-05-18 | End: 2025-05-20

## 2025-05-18 RX ADMIN — Medication 12 L/MIN: at 04:27

## 2025-05-18 RX ADMIN — LACTULOSE 20 G: 10 SOLUTION ORAL at 21:47

## 2025-05-18 RX ADMIN — SODIUM CHLORIDE, SODIUM LACTATE, POTASSIUM CHLORIDE, AND CALCIUM CHLORIDE 500 ML: .6; .31; .03; .02 INJECTION, SOLUTION INTRAVENOUS at 01:05

## 2025-05-18 RX ADMIN — NOREPINEPHRINE BITARTRATE 0.02 MCG/KG/MIN: 0.06 INJECTION, SOLUTION INTRAVENOUS at 17:04

## 2025-05-18 RX ADMIN — LACTULOSE 20 G: 10 SOLUTION ORAL at 13:42

## 2025-05-18 RX ADMIN — NOREPINEPHRINE BITARTRATE 0.02 MCG/KG/MIN: 8 INJECTION, SOLUTION INTRAVENOUS at 16:36

## 2025-05-18 RX ADMIN — TAMSULOSIN HYDROCHLORIDE 0.4 MG: 0.4 CAPSULE ORAL at 10:40

## 2025-05-18 RX ADMIN — VANCOMYCIN HYDROCHLORIDE 500 MG: 500 INJECTION, SOLUTION INTRAVENOUS at 20:40

## 2025-05-18 RX ADMIN — POTASSIUM CHLORIDE 20 MEQ: 1500 TABLET, EXTENDED RELEASE ORAL at 13:42

## 2025-05-18 RX ADMIN — SODIUM CHLORIDE, SODIUM LACTATE, POTASSIUM CHLORIDE, AND CALCIUM CHLORIDE 500 ML: 600; 310; 30; 20 INJECTION, SOLUTION INTRAVENOUS at 13:49

## 2025-05-18 RX ADMIN — INSULIN LISPRO 3 UNITS: 100 INJECTION, SOLUTION INTRAVENOUS; SUBCUTANEOUS at 01:07

## 2025-05-18 ASSESSMENT — COGNITIVE AND FUNCTIONAL STATUS - GENERAL
HELP NEEDED FOR BATHING: TOTAL
MOVING FROM LYING ON BACK TO SITTING ON SIDE OF FLAT BED WITH BEDRAILS: A LITTLE
MOVING TO AND FROM BED TO CHAIR: TOTAL
CLIMB 3 TO 5 STEPS WITH RAILING: TOTAL
PERSONAL GROOMING: TOTAL
MOBILITY SCORE: 9
EATING MEALS: A LITTLE
DRESSING REGULAR LOWER BODY CLOTHING: TOTAL
DRESSING REGULAR UPPER BODY CLOTHING: TOTAL
TURNING FROM BACK TO SIDE WHILE IN FLAT BAD: A LOT
STANDING UP FROM CHAIR USING ARMS: TOTAL
WALKING IN HOSPITAL ROOM: TOTAL
TOILETING: TOTAL
DAILY ACTIVITIY SCORE: 8

## 2025-05-18 ASSESSMENT — PAIN SCALES - GENERAL
PAINLEVEL_OUTOF10: 0 - NO PAIN

## 2025-05-18 ASSESSMENT — PAIN - FUNCTIONAL ASSESSMENT
PAIN_FUNCTIONAL_ASSESSMENT: 0-10

## 2025-05-18 NOTE — CONSULTS
Vancomycin Dosing by Pharmacy- INITIAL    Ramana Looney is a 84 y.o. year old male who Pharmacy has been consulted for vancomycin dosing for line infections. Based on the patient's indication and renal status this patient will be dosed based on a goal trough/random level of 15-20.     Renal function is currently declining.    Visit Vitals  BP 97/55   Pulse 79   Temp 36.6 °C (97.9 °F) (Temporal)   Resp 17        Lab Results   Component Value Date    CREATININE 6.33 (H) 2025        Patient weight is as follows:   Vitals:    25   Weight: 72.8 kg (160 lb 7.9 oz)       Cultures:  No results found for the encounter in last 14 days.        No intake/output data recorded.  I/O during current shift:  No intake/output data recorded.    Temp (24hrs), Av.8 °C (98.3 °F), Min:36.6 °C (97.9 °F), Max:37.1 °C (98.7 °F)         Assessment/Plan     Patient currently in MARY. Will dose by level starting with 1500 mg.  Follow-up level later tonight at 2200.  Will continue to monitor renal function daily while on vancomycin and order serum creatinine at least every 48 hours if not already ordered.  Follow for continued vancomycin needs, clinical response, and signs/symptoms of toxicity.       Alec Mead, PharmD
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
## 2025-05-18 NOTE — CONSULTS
Reason For Consult  MARY    History Of Present Illness  Ramana Looney is a 84 y.o. male with Pmhx  of HTN, HLD, DM II, BPH , newly diagnosed decompensated cirrhosis (undergoing evaluation )who originally presented to [facility] on 5/5 for one week of nausea, emesis, abdominal pain, and non-hemorrhagic diarrhea likely due to complicated UTI. Dialysis was initiated and he had three session. Last session was on 05/14th but he was unable to tolerate the dialysis due to hypotension so treatment was terminated.His stay was c/b arrhythmias, possible blood loss, atrial flutter requiring bb, several episodes of hypotension worsening the MARY.    Nephrology is consulted for Mary and inpatient dialysis.    Seen this am dose not report any nausea, vomiting or SOB but he does have some diarrhea.He denies any abdominal pain, itching, metallic taste, and is at baseline mentation. He noticed abdominal swelling for past couple of weeks that's worsening. He never seen a kidney doctor or have any kidney issues before.He denies any family history of kidney problems.     Past Medical History  He has no past medical history on file.    Surgical History  He has no past surgical history on file.     Social History  He reports that he has quit smoking. His smoking use included cigarettes. He has never used smokeless tobacco. He reports current alcohol use. He reports that he does not use drugs.    Family History  Family History[1]     Allergies  Ezetimibe-simvastatin, Lovastatin, Rosuvastatin calcium, and Sulfamethoxazole-trimethoprim    Review of Systems  12 ROS is negative unless otherwise indicated above.     Physical Exam  No apparent distress.  CTAB  Soft, distended, non-tender  S1S2  No edema, No JVD.          I&O 24HR    Intake/Output Summary (Last 24 hours) at 5/18/2025 1430  Last data filed at 5/18/2025 1000  Gross per 24 hour   Intake 1429.17 ml   Output --   Net 1429.17 ml       Vitals 24HR  Heart Rate:  []   Temp:  [36.4 °C  "(97.5 °F)-36.9 °C (98.4 °F)]   Resp:  [12-24]   BP: ()/(34-56)   Height:  [175.3 cm (5' 9\")]   Weight:  [72.8 kg (160 lb 7.9 oz)-73 kg (160 lb 15 oz)]   SpO2:  [90 %-100 %]       Assessment & Plan  Shock (Multi)    #MARY on CKD 3- likely multifactorial- pre-renal in setting of diarrhea, ATN from recurrent hypotension OSH requiring HD, HRS is one of the differential vs bile cast nephropathy  -acidbase are stable at the moment.  -Electrolytes are acceptable.  -Baseline cr 1.4-1.5  -Hemodynamically -have labile blood pressure but not on any pressors.  -UOP not documented yet    Recommendations:  -Will hold dialysis for now.Eval daily for RRT needs.  -Send UA, urin-lytes, UPCR, MACR, renal ultrasound and daily BMP.  -Strict I/O.  -Dose all medication to GFR.  -Avoid Nephrotoxins, contrast and keep SBP>90mmHg  -Will follow.        Jenna Reyna MD  Nephrology Fellow           [1] No family history on file.    "

## 2025-05-18 NOTE — CARE PLAN
Problem: Pain - Adult  Goal: Verbalizes/displays adequate comfort level or baseline comfort level  5/18/2025 0236 by Navi Turner RN  Outcome: Progressing  5/18/2025 0234 by Navi Turner RN  Outcome: Progressing  5/18/2025 0233 by Navi Turner RN  Outcome: Progressing

## 2025-05-18 NOTE — HOSPITAL COURSE
Ramana Looney is a 84 y.o. male with known diabetes, hyperlipidemia and hypertension who is a transfer from [facility] who initially presented with abdominal pain and diarrhea. His course is complicated by MARY with hyperkalemia needing emergency renal replacement therapy (did 4 times per intake note) and there was also noted to have hepatomegaly with multiple liver nodules with varying size concerning for liver cirrhosis vs hepatocellular CA needing biopsy (biopsy deferred 2/2 anemia). He was transferred to the ICU at OSH due to worsening renal and liver function and acidosis. He had episodes of hypotension requiring pressors 2/2 undifferentiated shock. Per OSH notes, there is a concern for line infection that is why his antibiotics was broaden to Meropenem (initially on CTX for UTI). Sepsis work up was started as well at OSH, line removed and placed at different location. Patient was then transferred to our institution for higher level of care for his undifferentiated shock.     Upon admission to [facility] MICU, pt found to have worsening oliguric MARY nephrology engaged. Lactate >4, although not on pressors and mentating well. Found to have tropinemia peaking to 1.67k, runs of wide-complex tachycardia, although asymptomatic with no chest pain/SOB, cardiology consulted. Hepatology also consulted for further workup of liver pathologies and c/f cirrhosis +/- HCC. Hgb found to be 6.4, 1u RBC given, no obvious source of bleeding; EGD 5/12 at OSH showed no bleeding, no varices. In afternoon of 5/18, started on low-dose pressors for hypotension. Broadened abx to vanc-chandrakant, infx workup sent.    To Dos:  - Cards recs for WCT and tropinemia, follow up with cards if they want coronary angiography (cath) vs CT angiography  - Follow up TTE  - Hepatology recs (they need to formally discuss mon 5/19 and staff?), trend CBC, coags; will eventually need liver bx for confirmation of dx (cirrhosis +/- HCC)  - S/p IV  fluids and started on low dose levo for hypotension, wean as tolerated and treat underlying undifferentiated shock  - Nephrology recs, follow up workup (UA, Ulytes, RBUS), oliguric (monitor for Uop), assess if RRT needed (line in place)  - Follow up cultures, MRSA nares, procalc, on vanc-chandrakant

## 2025-05-18 NOTE — CARE PLAN
Problem: Safety - Adult  Goal: Free from fall injury  Outcome: Progressing  Flowsheets (Taken 5/18/2025 1426)  Free from fall injury:   Instruct family/caregiver on patient safety   Based on caregiver fall risk screen, instruct family/caregiver to ask for assistance with transferring infant if caregiver noted to have fall risk factors     Problem: Fall/Injury  Goal: Not fall by end of shift  Outcome: Progressing  Goal: Be free from injury by end of the shift  Outcome: Progressing     Problem: Pain  Goal: Free from opioid side effects throughout the shift  Outcome: Progressing     Problem: Skin  Goal: Decreased wound size/increased tissue granulation at next dressing change  Outcome: Progressing  Flowsheets (Taken 5/18/2025 1426)  Decreased wound size/increased tissue granulation at next dressing change:   Protective dressings over bony prominences   Utilize specialty bed per algorithm  Goal: Participates in plan/prevention/treatment measures  Outcome: Progressing  Flowsheets (Taken 5/18/2025 1426)  Participates in plan/prevention/treatment measures: Elevate heels  Goal: Prevent/manage excess moisture  Outcome: Progressing  Flowsheets (Taken 5/18/2025 1426)  Prevent/manage excess moisture:   Cleanse incontinence/protect with barrier cream   Monitor for/manage infection if present   Follow provider orders for dressing changes   Moisturize dry skin  Goal: Prevent/minimize sheer/friction injuries  Outcome: Progressing  Flowsheets (Taken 5/18/2025 1426)  Prevent/minimize sheer/friction injuries:   Complete micro-shifts as needed if patient unable. Adjust patient position to relieve pressure points, not a full turn   Use pull sheet   HOB 30 degrees or less   Turn/reposition every 2 hours/use positioning/transfer devices   Utilize specialty bed per algorithm  Goal: Promote/optimize nutrition  Outcome: Progressing  Flowsheets (Taken 5/18/2025 1426)  Promote/optimize nutrition:   Monitor/record intake including meals    Discuss with provider if NPO > 2 days  Goal: Promote skin healing  Outcome: Progressing  Flowsheets (Taken 5/18/2025 1426)  Promote skin healing:   Assess skin/pad under line(s)/device(s)   Protective dressings over bony prominences   Turn/reposition every 2 hours/use positioning/transfer devices   Ensure correct size (line/device) and apply per  instructions   Rotate device position/do not position patient on device

## 2025-05-18 NOTE — CONSULTS
Consults  History Of Present Illness:        Ramana Looney is a 84 y.o. male with known diabetes, hyperlipidemia and hypertension. Cardiology consulted for troponin elevation and WCT.     Patient is a transfer from [facility] initially presented with abdominal pain and diarrhea. Clinical course complicated by MARY with hyperkalemia needing emergency RRT and there was also noted multiple nodules with varying size concerning for liver cirrhosis vs hepatocellular CA. He was transferred to the ICU at OSH due to worsening renal/liver function and acidosis and there was a concern for sepsis due to line infection. He is being treated with broad spectrum antibiotics. He was transferred to [facility] for higher level of care for his undifferentiated shock.     Seen at bedside, doing overall ok. No CP, palpitations, SOB or dizziness.     TTE 5/16/25 (OSH report, no images):  1.Left ventricular ejection fraction 65-70% with normal diastolic function.   2. Normal right ventricular systolic function.   3. Moderate aortic valve stenosis.   4. Trivial tricuspid valve regurgitation with moderate pulmonary hypertension   5. Mild concentric left ventricular hypertrophy   6. Mild left ventricular hypertrophy      Stress echocardiogram 9/10/24:   1. Normal global left ventricular systolic function.   2. Adequate level of stress achieved.   3. No clinical or electrocardiographic evidence for ischemia at maximal infusion.   4. There were no stress-induced wall motion abnormalities. This is a negative stress echo test for ischemia.       Past Medical History:  He has no past medical history on file.    Past Surgical History:  He has no past surgical history on file.      Social History:  He reports that he has quit smoking. His smoking use included cigarettes. He has never used smokeless tobacco. He reports current alcohol use. He reports that he does not use drugs.    Family History:  Family History[1]      Allergies:  Ezetimibe-simvastatin, Lovastatin, Rosuvastatin calcium, and Sulfamethoxazole-trimethoprim    Inpatient Medications:  Scheduled Medications[2]  PRN Medications[3]  Continuous Medications[4]  Outpatient Medications:  Current Outpatient Medications   Medication Instructions    amLODIPine (Norvasc) 5 mg tablet 1 tablet, oral, Daily    ascorbic acid (Vitamin C) 500 mg tablet 1 tablet, oral, Daily    aspirin 81 mg, oral, Daily    atenolol (TENORMIN) 25 mg, oral, Every 24 hours    fluticasone (Flonase) 50 mcg/actuation nasal spray 2 sprays, Each Nostril, Daily    folic acid/multivit-min/lutein (CENTRUM SILVER ORAL) 1 tablet, oral, Daily    gemfibrozil (LOPID) 600 mg, oral, 2 times daily before meals    glyBURIDE (DIABETA) 5 mg, oral, Every 12 hours    Lactobacillus acidophilus (PROBIOTIC ORAL) 1 tablet, oral, Daily    magnesium gluconate (Magonate) 27.5 mg magne- sium (500 mg) tablet 1 tablet, oral, Daily    metFORMIN (GLUCOPHAGE) 1,000 mg, oral    simvastatin (Zocor) 40 mg tablet 1 tablet, oral, Nightly    tamsulosin (FLOMAX) 0.4 mg, oral, Daily    valsartan (Diovan) 80 mg tablet 1 tablet, oral, Daily       Physical Exam:      Last Recorded Vitals:  Vitals:    05/18/25 1200 05/18/25 1300 05/18/25 1315 05/18/25 1400   BP: 93/51 84/56 81/52 (!) 90/38   BP Location: Left arm      Patient Position: Lying Lying     Pulse: 81 (!) 138 (!) 137 88   Resp: 16 24  18   Temp:       TempSrc:       SpO2: 93% 91%  93%   Weight:       Height:           Last Labs:  CBC - 5/18/2025: 10:59 AM  8.0 7.5 85    23.9      CMP - 5/18/2025:  5:43 AM  8.1 4.1 448 --- 9.9   5.1 3.0 453 318      PTT - 5/18/2025: 10:59 AM  1.4   15.7 29     Troponin I, High Sensitivity ([facility])   Date/Time Value Ref Range Status   05/18/2025 10:59 AM 1,671 (HH) 0 - 53 ng/L Final     Comment:     Previous result verified on 5/18/2025 0009 on specimen/case [id number] called with component [facility] for procedure Troponin I, High Sensitivity with value 1,337  ng/L.   05/18/2025 07:04 AM 1,665 (HH) 0 - 53 ng/L Final     Comment:     Previous result verified on 5/18/2025 0009 on specimen/case [id number] called with component [facility] for procedure Troponin I, High Sensitivity with value 1,337 ng/L.   05/18/2025 05:43 AM 1,670 (HH) 0 - 53 ng/L Final     Comment:     Previous result verified on 5/18/2025 0009 on specimen/case [id number] called with component [facility] for procedure Troponin I, High Sensitivity with value 1,337 ng/L.          Assessment/Plan       Ramana Looney is a 84 y.o. male with known diabetes, hyperlipidemia and hypertension. Cardiology consulted for troponin elevation and WCT.     Patient is a transfer from [facility] initially presented with abdominal pain and diarrhea. Clinical course complicated by MARY with hyperkalemia needing emergency RRT and there was also noted multiple nodules with varying size concerning for liver cirrhosis vs hepatocellular CA. He was transferred to the ICU at OSH due to worsening renal/liver function and acidosis and there was a concern for sepsis due to line infection. He is being treated with broad spectrum antibiotics. He was transferred to [facility] for higher level of care for his undifferentiated shock.     Pt remains asymptomatic from cardiac perspective.   ECG reviewed and is consistent with 2:1 atypical flutter. Pt Trop 1300>1600.   Recent TTE 5/16/25 at OSH showed normal EF. Stress echo was normal in 2024.    #Troponin elevation  -Likely type II demand iso severe illness (severe anemia, severe MARY and liver failure, possibly sepsis).   -continue troponin trend till peak  -Obtain formal TTE tomorrow AM  -Consider ASA 81mg daily if deemed safe by primary team  -Given that this is less likely to be true NSTEMI, would avoid anticoagulation iso severe anemia   -Obtain cardiac coronary angiography to evaluate for CAD    #Atypical aflutter  -Intermittent, asymptomatic.  -Recommend anticoagulation once  stable/deemed safe by primary team.  -If he has recurrent episode can trial low dose metoprolol tartrate 12.5.mg if BP allows, otherwise digoxin can be used.   -Will need EP follow up upon discharge.    Code Status:  DNR and No Intubation    I spent 60 minutes in the professional and overall care of this patient.        Briana Arguello MD       [1] No family history on file.  [2]   Scheduled medications   Medication Dose Route Frequency    [Held by provider] heparin (porcine)  5,000 Units subcutaneous q8h    insulin lispro  0-5 Units subcutaneous q4h    lactated Ringer's  500 mL intravenous Once    lactulose  20 g oral q8h    meropenem  500 mg intravenous Every Day    tamsulosin  0.4 mg oral Daily   [3]   PRN medications   Medication    albuterol    oxygen    vancomycin   [4]   Continuous Medications   Medication Dose Last Rate

## 2025-05-18 NOTE — PROGRESS NOTES
Vancomycin Dosing by Pharmacy- FOLLOW UP    Ramana Looney is a 84 y.o. year old male who Pharmacy has been consulted for vancomycin dosing for line infection. Based on the patient's indication and renal status this patient is being dosed based on a goal trough/random level of 15-20.     Renal function is currently declining.    Current vancomycin dose: 1500 mg given once (dosing by levels at this time)    Most recent random level: 14.8 mcg/mL (19 hour level)    Visit Vitals  BP (!) 101/43   Pulse 84   Temp 36.8 °C (98.2 °F)   Resp 16        Lab Results   Component Value Date    CREATININE 6.60 (H) 2025    CREATININE 6.33 (H) 2025        Patient weight is as follows:   Vitals:    25 0600   Weight: 73 kg (160 lb 15 oz)       Cultures:  No results found for the encounter in last 14 days.       I/O last 3 completed shifts:  In: 1623.4 (22.2 mL/kg) [I.V.:2.6 (0 mL/kg); Blood:350; IV Piggyback:1270.8]  Out: - (0 mL/kg)   Weight: 73 kg   I/O during current shift:  No intake/output data recorded.    Temp (24hrs), Av.7 °C (98 °F), Min:36.4 °C (97.5 °F), Max:36.9 °C (98.4 °F)      Assessment/Plan    Below goal random/trough level. Orders placed for new vancomycin regimen of 500 mg x1 dose.  The next level will be obtained on  at 2030. May be obtained sooner if clinically indicated.   Will continue to monitor renal function daily while on vancomycin and order serum creatinine at least every 48 hours if not already ordered.  Follow for continued vancomycin needs, clinical response, and signs/symptoms of toxicity.       Miguel Ángel Todd, PharmD

## 2025-05-18 NOTE — H&P
History of present illness:    HPI at  OSH  84-year-old male with known diabetes, hyperlipidemia and hypertension that presented to the ER with complaints of abdominal pain. Patient reports that he has had abdominal pain, nausea, vomiting and diarrhea for 7 days. He states that the diarrhea has improved slightly. Denies any sick contacts, his wife who is with him in the room has not been ill. Vital signs on arrival to the ER, temperature 97.7, heart rate 46, respiratory rate 18, blood pressure 99/42, sat on room air 97%. CBC shows no leukocytosis, H&H 10.6 and 32.0. BMP shows sodium of 130, potassium of 6.0, chloride 108, BUN and creatinine 91 and 2.91. BUN on 1/22/2025 was 29 and creatinine one 1.76. Lactic acid was 2.2, magnesium is 3.2 patient is on supplemental magnesium. Total bili is 3.7, AST at 150, , alk phos is 425. Lipase is 167. Urinalysis was positive for a moderate amount of leukocyte esterase. He received Rocephin IV in the ER. CT of the abdomen and pelvis showed bowel appears unremarkable. Marked atherosclerotic changes in the infrarenal aorta with chronic dissection and iliac vessels. Correlate for aortic occlusive symptoms. Liver is enlarged 20 cm in length with decreased attenuation suggesting fatty infiltration. Patient was seen and examined in the emergency room, denies any complaints of chest pain does have some chest tightness, currently has no nausea or vomiting. He does have some diffuse left-sided abdominal pain. He will be placed inpatient to a monitored bed for further evaluation and treatment.     Hospital Course:    85yo M came in with 5/5 for abdominal pain and diarhea, had found to have MARY and hyperkalemia that needing acute dialysis. Catheter was placed in right groin 5/7 and had dialysis x4 since then. CT abdomen did show enlarged liver associated with elevated with LFT's. MRI of the liver with noted numerous 0.5-3.5 cm mass lesions that needed liver biopsy but no noted  obstruction.There was also noted significant findings on his UA c/f UTI and he was started on Ceftriaxone.     Per OSH transfer records, Throughout the course of the hospitalization, the patient's lactic acid level increased and decreased with a maximum level of 14 being seen. This was obviously associated with a gap acidosis.    He was then transferred to  ICU because of the worsening acidemia, the patient is awake and alert this morning.He is awaiting transfer to [facility]. His INR is elevated. Biopsy was contemplated and deferred because of the possibility of bleeding complication.    Currently, blood cultures are pending. Procalcitonin is noted to be elevated but this is in the presence of renal failure. There is a note regarding metformin regarding lactic acidosis however I cannot see where that is been given recently. Vitamin K was given to reverse the patient's elevated INR secondary to liver failure. He was also given lactulose for pneumonia level that is elevated.    Nephrotoxic medications have been either discontinued or avoided. He continues to be on subcu heparin despite his elevated INR. Peptic ulcer disease prophylaxis is in place. Hepatitis serologies are all negative. Tunneled catheter was placed 5/15/25    5/17/2025  Still waiting for transfer. Metabolic acidosis has improved but creatinine has climbed dramatically.  Some Kussmaul respirations this a.m. Discussed with both cardiology and GI. Blood pressure is low but filling pressures appear low as well with a collapsible IVC per cardiology. Await further recommendations from them. The elevated right hemidiaphragm is not secondary to a subpulmonic effusion, instead, it secondary to hepatomegaly.    Due to worsening clinical status, patient was transferred in our institution for higher level of care.     Upon arrival in the MICU, patient is awake and follows commands. Not in distress. Per report from the transport team, patient had  "episodes of tachyarrhythmia while enroute lasting for 2 minutes that spontaneously resolved. Denies fever, HA, N/V, SOB, chest pain, palpitation or abdominal pain.    This note writer spoke with family at bedside. Per their knowledge, his last HD was on  5/14/25. New Dialysis line on the right chest was placed on 5/15/25 and trialysis line on the left chest 5/17/25.      Current Vitals:  BP (!) 97/47   Pulse 97   Temp 36.6 °C (97.9 °F) (Temporal)   Resp 17   Ht 1.753 m (5' 9\")   Wt 72.8 kg (160 lb 7.9 oz)   SpO2 97%   BMI 23.70 kg/m²      Labs:   OSH morning labs  CBC 9.5/ 7.6/23/94  /3.8/100/23/2.4/      Liver work up:   HAV IgM Negative  HBV core IgM Negative  HBV core Ab Negative  HBV surface Ab non reactive  HBV surface Ag negative  HCV Ab normal  HCV IgG non reactive  Ferritin  108    Stool pathogen PCR- negative    Imaging (from OSH)    5/6/25    CT Abdomen and Pelvis without contrast:   1. Bowel appears unremarkable.     2. Marked atherosclerotic changes of the infrarenal aorta with chronic dissection and iliac   vessels. Correlate for aortic occlusive symptoms.     3. Liver is enlarged measuring 20 cm in length with decreased attenuation suggesting fatty   infiltration.       MRCP 5/9/25    FINDINGS:     Small right pleural effusion.     Liver is enlarged measuring 20 cm in length with lobular border and numerous 0.5-3.5 cm mass   lesions. No fatty infiltration (6.4%, normal less than 10%). Small amount of ascites. Suspect   cirrhosis with regenerative nodules, however recommend CT guided biopsy to exclude malignancy.       Gallbladder is present. The common bile duct measures 4.3. No filling defects to suggest stones.     Pancreas is unremarkable. No dilation of the pancreatic duct.     The spleen, adrenal glands and kidneys are unremarkable. Suspect 10 mm proteinaceous cyst upper   pole left kidney.     IMPRESSION:     3T MRCP:   1. Liver is enlarged measuring 20 cm in length with lobular " border and numerous 0.5-3.5 cm mass   lesions. No fatty infiltration (6.4%, normal less than 10%). Small amount of ascites. Suspect   cirrhosis with regenerative nodules, however recommend CT guided biopsy to exclude malignancy.     2. Normal appearing common bile duct. No filling defects to suggest stones.       RUQ 5/13/25    FINDINGS:   The liver is enlarged measuring 20 cm in length with heterogeneous echotexture. MRI from   05/09/2025 showed numerous 0.5-3.5 cm mass lesions.     Small amount of ascites     Normal waveform pattern and flow in the hepatic artery and veins.     Abnormal waveform pattern and hepatopetal flow in the portal vein (velocity 11.5 cm/sec,   pulsitility index (PI) 0.0, normal > 15 cm/sec, PI > 0.5).     Normal waveform pattern and flow in the inferior vena cava.     The gallbladder is normal in size with no wall thickening or shadowing stones identified.     Common bile duct measures 2.5 mm. Biliary tree is not dilated. No adjacent fluid collections are   noted.     Pancreas is somewhat obscurred by gas, but no definite mass lesions are seen.     IMPRESSION     Ultrasound Liver with color doppler vascular flow:   1. Enlarged liver measuring 20 cm in length. Heterogeneous echotexture. MRI from 05/09/2025   showed numerous 0.5-3.5 cm mass lesions which could be regenerative nodules. Would recommend CT guided biopsy to exclude malignancy.   2. Abnormal wave form pattern suggesting portal hypertension.   3. Small amount of ascites.     Stress Echo 9/14/2024  Summary:  1. Normal global left ventricular systolic function.  2. Adequate level of stress achieved.  3. No clinical or electrocardiographic evidence for ischemia at maximal infusion.  4. There were no stress-induced wall motion abnormalities. This is a negative stress echo test for ischemia.     Medications   aspirin EC tablet 81 mg (has no administration in time range)   tamsulosin (Flomax) 24 hr capsule 0.4 mg (has no administration  in time range)   heparin (porcine) injection 5,000 Units (has no administration in time range)   vancomycin (Vancocin) pharmacy to dose - pharmacy monitoring (has no administration in time range)   meropenem (Merrem) 1 g in sodium chloride 0.9% IV 50 mL (has no administration in time range)   vancomycin (Vancocin) 1,500 mg in sodium chloride 0.9%  mL (has no administration in time range)       GI Hx:  Last EGD: No results found for this or any previous visit.  Last Colonoscopy:  No results found for this or any previous visit.    Past Medical History:  As mentioned above. No history on cancer. Denies family hx of cancer as well.     Past Surgical History:  He has no past surgical history on file.     Social history: , Former cigarette smoker, smoked 1 pack daily.Quit 2014. Drinks alcohol occasionally. Does housework daily. Denies illicit drug use.   Reviewed, no changes.     Allergies:  Ezetimibe-simvastatin, Lovastatin, Rosuvastatin calcium, and Sulfamethoxazole-trimethoprim    Home medications:  Prior to Admission medications    Medication Sig Start Date End Date Taking? Authorizing Provider   amLODIPine (Norvasc) 5 mg tablet Take 1 tablet (5 mg) by mouth once daily. 5/14/24   Historical Provider, MD   ascorbic acid (Vitamin C) 500 mg tablet Take 1 tablet (500 mg) by mouth once daily.    Historical Provider, MD   aspirin 81 mg EC tablet Take 1 tablet (81 mg) by mouth once daily.    Historical Provider, MD   atenolol (Tenormin) 25 mg tablet Take 1 tablet (25 mg) by mouth once every 24 hours.    Historical Provider, MD   fluticasone (Flonase) 50 mcg/actuation nasal spray Administer 2 sprays into each nostril once daily. 5/17/23   Historical Provider, MD   folic acid/multivit-min/lutein (CENTRUM SILVER ORAL) Take 1 tablet by mouth once daily.    Historical Provider, MD   gemfibrozil (Lopid) 600 mg tablet Take 1 tablet (600 mg) by mouth 2 times a day before meals.    Historical Provider, MD   glyBURIDE  "(Diabeta) 5 mg tablet Take 1 tablet (5 mg) by mouth every 12 hours.    Historical Provider, MD   Lactobacillus acidophilus (PROBIOTIC ORAL) Take 1 tablet by mouth once daily.    Historical Provider, MD   magnesium gluconate (Magonate) 27.5 mg magne- sium (500 mg) tablet Take 1 tablet (27.5 mg) by mouth once daily.    Historical Provider, MD   metFORMIN (Glucophage) 1,000 mg tablet Take 1 tablet (1,000 mg) by mouth. 9/18/24   Historical Provider, MD   simvastatin (Zocor) 40 mg tablet Take 1 tablet (40 mg) by mouth once daily at bedtime. 8/16/24   Historical Provider, MD   tamsulosin (Flomax) 0.4 mg 24 hr capsule Take 1 capsule (0.4 mg) by mouth once daily.    Historical Provider, MD   valsartan (Diovan) 80 mg tablet Take 1 tablet (80 mg) by mouth once daily. 3/20/24   Historical Provider, MD          Objective     Review of systems  A full 12pt ROS was completed and otherwise negative except noted per HPI.     Vital signs:  Blood pressure (!) 97/47, pulse 97, temperature 36.6 °C (97.9 °F), temperature source Temporal, resp. rate 17, height 1.753 m (5' 9\"), weight 72.8 kg (160 lb 7.9 oz), SpO2 97%.    Physical Exam  General: No acute distress, appropriate conversation  HEENT:  Normocephalic/atraumatic, EOMI, PERRL, oral cavity clear of lesions, MMM, nares patent  Neck:  No JVD detectable, no LAD  Cardiac:  RRR, no m/r/g  Pulm:  CTAB, equal and bilateral chest rise  GI: + distended abdomen, soft, non tender, + fluid wave  Extremities:  No edema noted, no chronic scars noted  Neuro:  AOx3, preserved strength in upper and lower extremities, preserved sensation, CN2-12 intact, No asterexis    Meds:  Medications Ordered Prior to Encounter[1]       Assessment/Plan     Ramana Looney is a 84 y.o. male with known diabetes, hyperlipidemia and hypertension who is a transfer from [facility] who initially presented with abdominal pain and diarrhea. His course is complicated by MARY with hyperkalemia needing " emergency renal replacement therapy (did 4 times per intake note) and there was also noted multiple nodules with varying size concerning for liver cirrhosis vs hepatocellular CA needing biopsy. His hepatitis panel was negative and stool pathogen PCR was negative as well. He was transfer at OSH in the ICU due worsening renal and liver function and acidosis. He had episodes of hypotension requiring pressors. Per OSH notes, there is a concern for line infection that is why his antibiotics was broaden to Meropenem. Sepsis work up was started as well at OSH.  Patient was then transferred to our institution for higher level of care for his undifferentiated shock.     Neuro:  # Metabolic encephalopathy  - most likely from possible liver cirrhosis  - C/w Lactulose    Cardiovascular:  # Undifferentiated Shock  - last stress echo 9/2024 Normal global left ventricular systolic function.  - repeat Echo ordered  - repeat blood culture  - follow up cultures at OSH  - Start Vanc and c/w meropenem renally dose    # Wide complex tachycardia?  - there was report that patient was started on Cardizem gtt due to runs of tachyarrhthmia at OSH.  - There was no noted recurrence while on gtt but the gtt was stopped due to c/f undifferentiaed shock  - c/w telemetry monitoring    # HTN  - hold Amlodipine 5 mg once a day, atenolol 25 once a day and valsartan 80 mg once a day in the setting of shock    # DLD  - HOLD simvastatin 40 mg tPO once a day and Gemfibrozil 600 mg BID in the setting of worsening LFTs  - Lipid profile ordered      Respiratory:  # Acute hypoxic respiratory failure  - there was report that patient had increasing O2 requirements today. On admission he was on 5L but today he needed HFNC 15 L.  - CXR ordered     GI:  # Multiple liver nodules c/f Cirrhosis to r/o HCC  - only known risk factor as of now is occasional alcohol use. Lipid and A1c ordered.   -multiple nodules  0.5-3.5 cm mass lesions on MRI liver with noted portal  HTN on RUQ with Doppler.   - Hepatitis panel at OSH were negative. Ferritin 108.  - Will complete the liver stamp work up. SHIV, AMA, ASMA, Ceruloplasmin, AAT and Ferritin ordered  - Once more stable he will be needing liver biopsy to r/o HCC  - Consult Hepatology in the AM      Renal/:  # MARY  # Concern for HRS  - baseline creatinine per OSH 1.7  - started emergency RRT and had 4 sessions at OSH  - Creatinine at OSH is 5.4  - consult Nephrology in the AM for HD continuation    # UTI  - started on Ceftriaxone at OSH but broaden to Meropenem I/s/o of shock    # BPH  - c/w tamsulosin 0.4 mg qd     Heme/Onc:  # Anemia  - baseline 10-11  - Today 7.6  - no active bleeding  - T and S ordered; consent for blood secured on admission.     Endo:  # DM type 2  - Holding Metformin 1g once a day and glyburide 5 mg BID    ID:  - see above      F: Replete PRN  E: Replete PRN  N: NPO  DVT Ppx: Heparin SubQ   GI Ppx: Pantoprazole   Access/Lines: PIV / R tunneled double lumen dialysis line ( 5/15/25); Triple lumen tunneled left chest (5/17/25)  Abx: Vanc and Meropenem  O2: 10 L HFNC    Pain regimen:   GI Laxative: Lactulose    Code status: DNR/DNI  Emergency contact:    Shoshana Looney (spouse) [phone]    Pawel Salas MD  Internal Medicine PGY-3    =======  Brief Attending Summary:   Acute kidney injury  Acute hypoxemic respiratory failure  Hypotension  Cirrhosis    I have reviewed and evaluated the most recent data and results, personally examined the patient, and formulated the plan of care as presented above. This patient was critically ill and required continued critical care treatment. Teaching and any separately billable procedures are not included in the time calculation.    Billing Provider Critical Care Time: 42 minutes    Sen Jacobo MD          [1]   No current facility-administered medications on file prior to encounter.     Current Outpatient Medications on File Prior to Encounter   Medication Sig Dispense  Refill    amLODIPine (Norvasc) 5 mg tablet Take 1 tablet (5 mg) by mouth once daily.      ascorbic acid (Vitamin C) 500 mg tablet Take 1 tablet (500 mg) by mouth once daily.      aspirin 81 mg EC tablet Take 1 tablet (81 mg) by mouth once daily.      atenolol (Tenormin) 25 mg tablet Take 1 tablet (25 mg) by mouth once every 24 hours.      fluticasone (Flonase) 50 mcg/actuation nasal spray Administer 2 sprays into each nostril once daily.      folic acid/multivit-min/lutein (CENTRUM SILVER ORAL) Take 1 tablet by mouth once daily.      gemfibrozil (Lopid) 600 mg tablet Take 1 tablet (600 mg) by mouth 2 times a day before meals.      glyBURIDE (Diabeta) 5 mg tablet Take 1 tablet (5 mg) by mouth every 12 hours.      Lactobacillus acidophilus (PROBIOTIC ORAL) Take 1 tablet by mouth once daily.      magnesium gluconate (Magonate) 27.5 mg magne- sium (500 mg) tablet Take 1 tablet (27.5 mg) by mouth once daily.      metFORMIN (Glucophage) 1,000 mg tablet Take 1 tablet (1,000 mg) by mouth.      simvastatin (Zocor) 40 mg tablet Take 1 tablet (40 mg) by mouth once daily at bedtime.      tamsulosin (Flomax) 0.4 mg 24 hr capsule Take 1 capsule (0.4 mg) by mouth once daily.      valsartan (Diovan) 80 mg tablet Take 1 tablet (80 mg) by mouth once daily.

## 2025-05-18 NOTE — CARE PLAN
Problem: Pain - Adult  Goal: Verbalizes/displays adequate comfort level or baseline comfort level  5/18/2025 0236 by Navi Turner RN  Outcome: Progressing  5/18/2025 0236 by Navi Turner RN  Outcome: Progressing  5/18/2025 0234 by Navi Turner RN  Outcome: Progressing  5/18/2025 0233 by Navi Turner RN  Outcome: Progressing     Problem: Safety - Adult  Goal: Free from fall injury  5/18/2025 0236 by Navi Turner RN  Outcome: Progressing  5/18/2025 0236 by Navi Turner RN  Outcome: Progressing  5/18/2025 0234 by Navi Turner RN  Outcome: Progressing  5/18/2025 0233 by Navi Turner RN  Outcome: Progressing     Problem: Discharge Planning  Goal: Discharge to home or other facility with appropriate resources  5/18/2025 0236 by Navi Turner RN  Outcome: Progressing  5/18/2025 0236 by Navi Turner RN  Outcome: Progressing  5/18/2025 0234 by Navi Turner RN  Outcome: Progressing  5/18/2025 0233 by Navi Turner RN  Outcome: Progressing     Problem: Chronic Conditions and Co-morbidities  Goal: Patient's chronic conditions and co-morbidity symptoms are monitored and maintained or improved  5/18/2025 0236 by Navi Turner RN  Outcome: Progressing  5/18/2025 0236 by Navi Turner RN  Outcome: Progressing  5/18/2025 0234 by Navi Turner RN  Outcome: Progressing  5/18/2025 0233 by Navi Turner RN  Outcome: Progressing     Problem: Pain - Adult  Goal: Verbalizes/displays adequate comfort level or baseline comfort level  5/18/2025 0236 by Navi Turner RN  Outcome: Progressing  5/18/2025 0236 by Navi Turner RN  Outcome: Progressing  5/18/2025 0234 by Navi Turner RN  Outcome: Progressing  5/18/2025 0233 by Navi Turner RN  Outcome: Progressing     Problem: Safety - Adult  Goal: Free from fall injury  5/18/2025 0236 by Navi Turner RN  Outcome: Progressing  5/18/2025 0236 by Navi Turner RN  Outcome: Progressing  5/18/2025 0234 by Navi Johnny, RN  Outcome: Progressing  5/18/2025 0233 by  Navi Turner RN  Outcome: Progressing     Problem: Discharge Planning  Goal: Discharge to home or other facility with appropriate resources  5/18/2025 0236 by Navi Turner RN  Outcome: Progressing  5/18/2025 0236 by Navi Turner RN  Outcome: Progressing  5/18/2025 0234 by Navi Turner RN  Outcome: Progressing  5/18/2025 0233 by Navi Turner RN  Outcome: Progressing     Problem: Chronic Conditions and Co-morbidities  Goal: Patient's chronic conditions and co-morbidity symptoms are monitored and maintained or improved  5/18/2025 0236 by Navi Turner RN  Outcome: Progressing  5/18/2025 0236 by Navi Turner RN  Outcome: Progressing  5/18/2025 0234 by Navi Turner RN  Outcome: Progressing  5/18/2025 0233 by Navi Turner RN  Outcome: Progressing     Problem: Nutrition  Goal: Nutrient intake appropriate for maintaining nutritional needs  5/18/2025 0236 by Navi Turner RN  Outcome: Progressing  5/18/2025 0236 by Navi Turner RN  Outcome: Progressing  5/18/2025 0234 by Navi Turner RN  Outcome: Progressing  5/18/2025 0233 by Navi Turner RN  Outcome: Progressing     Problem: Fall/Injury  Goal: Not fall by end of shift  5/18/2025 0236 by Navi Turner RN  Outcome: Progressing  5/18/2025 0236 by Navi Turner RN  Outcome: Progressing  5/18/2025 0234 by Navi Turner RN  Outcome: Progressing  5/18/2025 0233 by Navi Turner RN  Outcome: Progressing  Goal: Be free from injury by end of the shift  5/18/2025 0236 by Navi Turner RN  Outcome: Progressing  5/18/2025 0236 by Navi Turner RN  Outcome: Progressing  5/18/2025 0234 by Navi Turner RN  Outcome: Progressing  5/18/2025 0233 by Navi Turner RN  Outcome: Progressing  Goal: Verbalize understanding of personal risk factors for fall in the hospital  5/18/2025 0236 by Navi Turner RN  Outcome: Progressing  5/18/2025 0236 by Navi Turner RN  Outcome: Progressing  5/18/2025 0234 by Navi Johnny, RN  Outcome: Progressing  5/18/2025 0233 by  Navi Turner RN  Outcome: Progressing  Goal: Verbalize understanding of risk factor reduction measures to prevent injury from fall in the home  5/18/2025 0236 by Navi Turner RN  Outcome: Progressing  5/18/2025 0236 by Navi Turner RN  Outcome: Progressing  5/18/2025 0234 by Navi Turner RN  Outcome: Progressing  5/18/2025 0233 by Navi Turner RN  Outcome: Progressing  Goal: Use assistive devices by end of the shift  5/18/2025 0236 by Navi Turner RN  Outcome: Progressing  5/18/2025 0236 by Navi Turner RN  Outcome: Progressing  5/18/2025 0234 by Navi Turner RN  Outcome: Progressing  5/18/2025 0233 by Navi Turner RN  Outcome: Progressing  Goal: Pace activities to prevent fatigue by end of the shift  5/18/2025 0236 by Navi Turner RN  Outcome: Progressing  5/18/2025 0236 by Navi Turner RN  Outcome: Progressing  5/18/2025 0234 by Navi Turner RN  Outcome: Progressing  5/18/2025 0233 by Navi Turner RN  Outcome: Progressing     Problem: Pain  Goal: Takes deep breaths with improved pain control throughout the shift  5/18/2025 0236 by Navi Turner RN  Outcome: Progressing  5/18/2025 0236 by Navi Turner RN  Outcome: Progressing  5/18/2025 0234 by Navi Turner RN  Outcome: Progressing  5/18/2025 0233 by Navi Turner RN  Outcome: Progressing  Goal: Turns in bed with improved pain control throughout the shift  5/18/2025 0236 by Navi Turner RN  Outcome: Progressing  5/18/2025 0236 by Navi Turner RN  Outcome: Progressing  5/18/2025 0234 by Navi Turner RN  Outcome: Progressing  5/18/2025 0233 by Navi Turner RN  Outcome: Progressing  Goal: Walks with improved pain control throughout the shift  5/18/2025 0236 by Navi Turner RN  Outcome: Progressing  5/18/2025 0236 by Navi Turner RN  Outcome: Progressing  5/18/2025 0234 by Navi Turner RN  Outcome: Progressing  5/18/2025 0233 by Navi Turner, RN  Outcome: Progressing  Goal: Performs ADL's with improved pain control  throughout shift  5/18/2025 0236 by Navi Turner RN  Outcome: Progressing  5/18/2025 0236 by Navi Turner RN  Outcome: Progressing  5/18/2025 0234 by Navi Turner RN  Outcome: Progressing  5/18/2025 0233 by Navi Turner RN  Outcome: Progressing  Goal: Participates in PT with improved pain control throughout the shift  5/18/2025 0236 by Navi Turner RN  Outcome: Progressing  5/18/2025 0236 by Navi Turner RN  Outcome: Progressing  5/18/2025 0234 by Navi Turner RN  Outcome: Progressing  5/18/2025 0233 by Navi Turner RN  Outcome: Progressing  Goal: Free from opioid side effects throughout the shift  5/18/2025 0236 by Navi Turner RN  Outcome: Progressing  5/18/2025 0236 by Navi Turner RN  Outcome: Progressing  5/18/2025 0234 by Navi Turner RN  Outcome: Progressing  5/18/2025 0233 by Navi Turner RN  Outcome: Progressing  Goal: Free from acute confusion related to pain meds throughout the shift  5/18/2025 0236 by Navi Turner RN  Outcome: Progressing  5/18/2025 0236 by Navi Turner RN  Outcome: Progressing  5/18/2025 0234 by Navi Turner RN  Outcome: Progressing  5/18/2025 0233 by Navi Turner RN  Outcome: Progressing     Problem: Skin  Goal: Decreased wound size/increased tissue granulation at next dressing change  5/18/2025 0236 by Navi Turner RN  Outcome: Progressing  Flowsheets (Taken 5/18/2025 0236)  Decreased wound size/increased tissue granulation at next dressing change: Promote sleep for wound healing  5/18/2025 0236 by Navi Turner RN  Outcome: Progressing  Flowsheets (Taken 5/18/2025 0236)  Decreased wound size/increased tissue granulation at next dressing change: Promote sleep for wound healing  5/18/2025 0234 by Navi Turner RN  Outcome: Progressing  Flowsheets (Taken 5/18/2025 0234)  Decreased wound size/increased tissue granulation at next dressing change: Promote sleep for wound healing  5/18/2025 0233 by Navi Turner RN  Outcome: Progressing  Goal:  Participates in plan/prevention/treatment measures  5/18/2025 0236 by Navi Turner RN  Outcome: Progressing  Flowsheets (Taken 5/18/2025 0234)  Participates in plan/prevention/treatment measures: Discuss with provider PT/OT consult  5/18/2025 0236 by Navi Turner RN  Outcome: Progressing  5/18/2025 0234 by Navi Turner RN  Outcome: Progressing  Flowsheets (Taken 5/18/2025 0234)  Participates in plan/prevention/treatment measures: Discuss with provider PT/OT consult  5/18/2025 0233 by Navi Turner RN  Outcome: Progressing  Goal: Prevent/manage excess moisture  5/18/2025 0236 by Navi Turner RN  Outcome: Progressing  Flowsheets (Taken 5/18/2025 0236)  Prevent/manage excess moisture: Use wicking fabric (obtain order)  5/18/2025 0236 by Navi Turner RN  Outcome: Progressing  Flowsheets (Taken 5/18/2025 0236)  Prevent/manage excess moisture: Use wicking fabric (obtain order)  5/18/2025 0234 by Navi Turner RN  Outcome: Progressing  Flowsheets (Taken 5/18/2025 0234)  Prevent/manage excess moisture: Monitor for/manage infection if present  5/18/2025 0233 by Navi Turner RN  Outcome: Progressing  Goal: Prevent/minimize sheer/friction injuries  5/18/2025 0236 by Navi Turner RN  Outcome: Progressing  Flowsheets (Taken 5/18/2025 0236)  Prevent/minimize sheer/friction injuries: Turn/reposition every 2 hours/use positioning/transfer devices  5/18/2025 0236 by Navi Turner RN  Outcome: Progressing  Flowsheets (Taken 5/18/2025 0236)  Prevent/minimize sheer/friction injuries: Turn/reposition every 2 hours/use positioning/transfer devices  5/18/2025 0234 by Navi Turner RN  Outcome: Progressing  Flowsheets (Taken 5/18/2025 0234)  Prevent/minimize sheer/friction injuries: Complete micro-shifts as needed if patient unable. Adjust patient position to relieve pressure points, not a full turn  5/18/2025 0233 by Navi Turner RN  Outcome: Progressing  Goal: Promote/optimize nutrition  5/18/2025 0236 by Navi  CONSTANTINE Turner  Outcome: Progressing  Flowsheets (Taken 5/18/2025 0236)  Promote/optimize nutrition: Offer water/supplements/favorite foods  5/18/2025 0236 by Navi Turner RN  Outcome: Progressing  Flowsheets (Taken 5/18/2025 0236)  Promote/optimize nutrition: Offer water/supplements/favorite foods  5/18/2025 0234 by Navi Turner RN  Outcome: Progressing  Flowsheets (Taken 5/18/2025 0234)  Promote/optimize nutrition: Assist with feeding  5/18/2025 0233 by Navi Turner RN  Outcome: Progressing  Goal: Promote skin healing  5/18/2025 0236 by Navi Turner RN  Outcome: Progressing  Flowsheets (Taken 5/18/2025 0236)  Promote skin healing: Rotate device position/do not position patient on device  5/18/2025 0236 by Navi Turner RN  Outcome: Progressing  Flowsheets (Taken 5/18/2025 0236)  Promote skin healing: Rotate device position/do not position patient on device  5/18/2025 0234 by Navi Turner RN  Outcome: Progressing  Flowsheets (Taken 5/18/2025 0234)  Promote skin healing: Assess skin/pad under line(s)/device(s)  5/18/2025 0233 by Navi Turner RN  Outcome: Progressing

## 2025-05-18 NOTE — PROGRESS NOTES
"Medical Intensive Care - Daily Progress Note   Subjective    Ramana Looney is a 84 y.o. year old male patient admitted on 5/17/2025 with following ICU needs: undifferentiated shock, acute hypoxemic respiratory failure requiring HFNC    Interval History:  - Hgb 6.4, ordered 1u RBC  - Examined this AM, Aox3, cooperative, no current concerns and is asymptomatic, breathing comfortably on HFNC. No CP, SOB. Currently not on any pressors.    Meds    Scheduled medications  Scheduled Medications[1]  Continuous medications  Continuous Medications[2]  PRN medications  PRN Medications[3]     Objective    Blood pressure (!) 100/46, pulse 89, temperature 36.8 °C (98.2 °F), resp. rate 23, height 1.753 m (5' 9\"), weight 73 kg (160 lb 15 oz), SpO2 94%.     Physical Exam  HENT:      Head: Normocephalic and atraumatic.   Cardiovascular:      Rate and Rhythm: Normal rate.      Pulses: Normal pulses.      Heart sounds: Normal heart sounds.   Pulmonary:      Effort: Pulmonary effort is normal.   Abdominal:      General: There is distension.      Palpations: Abdomen is soft.   Neurological:      General: No focal deficit present.      Mental Status: He is alert and oriented to person, place, and time. Mental status is at baseline.            Intake/Output Summary (Last 24 hours) at 5/18/2025 1723  Last data filed at 5/18/2025 1435  Gross per 24 hour   Intake 1620.84 ml   Output --   Net 1620.84 ml     Labs:   Results from last 72 hours   Lab Units 05/18/25  0543 05/17/25  2216   SODIUM mmol/L 140 139   POTASSIUM mmol/L 3.8 3.7   CHLORIDE mmol/L 101 98   CO2 mmol/L 21 21   BUN mg/dL 69* 67*   CREATININE mg/dL 6.60* 6.33*   GLUCOSE mg/dL 154* 230*   CALCIUM mg/dL 8.1* 8.3*   ANION GAP mmol/L 22* 24*   EGFR mL/min/1.73m*2 8* 8*   PHOSPHORUS mg/dL 5.1* 4.8      Results from last 72 hours   Lab Units 05/18/25  1059 05/18/25  0543 05/17/25  2216   WBC AUTO x10*3/uL 8.0 8.4 8.0   HEMOGLOBIN g/dL 7.5* 6.4* 6.9*   HEMATOCRIT % 23.9* 19.9* 22.2* "   PLATELETS AUTO x10*3/uL 85* 88* 95*   NEUTROS PCT AUTO % 83.1 82.0  --    LYMPHO PCT MAN %  --   --  9.5   LYMPHS PCT AUTO % 7.3 8.3  --    MONO PCT MAN %  --   --  1.7   MONOS PCT AUTO % 7.7 7.8  --    EOSINO PCT MAN %  --   --  0.0   EOS PCT AUTO % 0.2 0.1  --           Results from last 72 hours   Lab Units 05/18/25  1059   POCT PH, VENOUS pH 7.35   POCT PCO2, VENOUS mm Hg 36*   POCT PO2, VENOUS mm Hg 51*        Micro/ID:     Lab Results   Component Value Date    BLOODCULT Loaded on Instrument - Culture in progress 05/17/2025    BLOODCULT Loaded on Instrument - Culture in progress 05/17/2025       Summary of key imaging results from the last 24 hours    CXR 5/17  IMPRESSION:  1. Right more than left bibasilar opacity which may be due to  atelectasis or infectious infiltrate.  2. Small right pleural effusion.  3. Recommend follow-up radiograph.  4. Medical devices as above.    Assessment and Plan     Assessment:   Ramana Looney is a 84 y.o. male with known diabetes, hyperlipidemia and hypertension who is a transfer from [facility] who initially presented with abdominal pain and diarrhea. His course is complicated by MARY with hyperkalemia needing emergency renal replacement therapy (did 4 times per intake note) and there was also noted multiple nodules with varying size concerning for liver cirrhosis vs hepatocellular CA needing biopsy. His hepatitis panel was negative and stool pathogen PCR was negative as well. He was transfer at OSH in the ICU due worsening renal and liver function and acidosis. He had episodes of hypotension requiring pressors. Per OSH notes, there is a concern for line infection that is why his antibiotics was broaden to Meropenem. Sepsis work up was started as well at OSH.  Patient was then transferred to our institution for higher level of care for his undifferentiated shock.     Mechanical Ventilation: none  Sedation/Analgesia:  none  Restraints: no    Summary for  05/18/25  :  GI/hepatology consulted for further workup of ?cirrhosis and c/f HRS given worsening MARY  Nephrology consulted for worsening MARY, has received RRT 4x at OSH before  Cardiology consulted for tropinemia and paroxysmal wide-complex tachycardia seen on tele and EKG, trend trop to peak  Hgb <7 this AM, transfuse 1u RBC  Off pressors currently, monitor BP and give fluid bolus as needed and start levo if required  Given hx of cough, initiate infx workup with MRSA nares, sputum cx, procalc, currently on vanc-chandrakant, f/up bcx    Plan:  Neuro:  # Metabolic encephalopathy, improving  - most likely from possible liver cirrhosis vs uremia  - C/w Lactulose     Cardiovascular:  # Undifferentiated Shock  :: etiology - septic (source: UTI vs PNA vs ?SBP), hypovolemia, cardiogenic (tropinemia, arrhythmia)  :: last stress echo 9/2024 Normal global left ventricular systolic function.  :: lactate 4.8 on admission, peaked 14 at OSH  - repeat TTE ordered  - follow up blood culture here, follow up MRSA nares, procalc, sputum cx  - follow up cultures at OSH ([facility])  - C/w vanc meropenem, renally dose  - replete fluids and start pressors as needed to maintain MAP>65     # Tropinemia  # Wide complex tachycardia, atypical aflutter?  :: there was report that patient was started on Cardizem gtt due to runs of tachyarrhthmia at OSH.  :: There was no noted recurrence while on gtt but the gtt was stopped due to c/f undifferentiaed shock  :: Wide-complex tachycardia to 140s seen on tele here, pt asymptomatic and HDS  :: Tropinemia peaking to 1.67k at [facility]  - Cardiology consulted for tropinemia and arrhythmia, appreciate recs  - Pt does not have chest pain or SOB, is not symptomatic and has no concerns, is HDS. Tropinemia likely due to type II MI iso recent shock, already peaked. Hold off  hep gtt, ASA for now iso anemia requiring transfusions  - Can consider trialling low-dose BB (metop) for tachyarrhythmia if pressures are stable  -  c/w telemetry monitoring     # HTN  - hold Amlodipine 5 mg once a day, atenolol 25 once a day and valsartan 80 mg once a day in the setting of shock     # DLD  - HOLD simvastatin 40 mg tPO once a day and Gemfibrozil 600 mg BID in the setting of worsening LFTs  - Lipid profile pending        Respiratory:  # Acute hypoxic respiratory failure  # C/f PNA  :: Having increased O2 requirements, currently on HFNC  :: Likely due to PNA vs vol overload  - CXR showed atelectasis vs infiltrate  - Vanc hcandrakant as above  - follow up blood culture here, follow up MRSA nares, procalc, sputum cx  - follow up cultures at OSH ([facility])  - can repeat CXR to monitor progression/resolution       GI:  # Multiple liver nodules c/f Cirrhosis to r/o HCC  - only known risk factor as of now is occasional alcohol use. Lipid and A1c ordered.   - multiple nodules  0.5-3.5 cm mass lesions on MRI liver with noted portal HTN on RUQ with Doppler. No obstructive disease on MRCP.  - Hepatitis panel at OSH were negative. Ferritin 108.  - Will complete the liver stamp work up. SHIV, AMA, ASMA, Ceruloplasmin, AAT and Ferritin ordered  - Once more stable he will be needing liver biopsy to r/o HCC  - Consulted Hepatology, appreciate recs  - EGD at OSH 5/12 showing no varices or active bleed, evidence of PHG, normal esophagus and duodenum. Bx taken, showed no H pylori, chronic mild gastritis.        Renal/:  # MARY, oliguric  # Concern for HRS  - baseline creatinine per OSH 1.7  - started emergency RRT and had 4 sessions at OSH  - Creatinine at OSH is 5.4, now up to 6.6  - likely etiology is pre-renal vs ATN iso recent shock, BP changes, diarrhea, ?HRS  - consulted Nephrology for possible RRT   - pending UA, Ucx, Ulytes, RBUS, UACR (pt oliguric)     # UTI  - started on Ceftriaxone at OSH for UTI but broaden to Meropenem iso of shock  - repeat UA/Ucx pending (pt oliguric)     # BPH  - c/w tamsulosin 0.4 mg qd      Heme/Onc:  # Anemia  - baseline 10-11  - Today  Hgb dropping to 6.4, 1u RBC ordered  - no obvious sites of active bleeding, EGD 5/12 at OSH showed no active bleeding  - T and S ordered; consent for blood secured on admission  - TEG, fibrinogen ordered  - Consider further anemia workup     Endo:  # DM type 2  - Holding Metformin 1g once a day and glyburide 5 mg BID     ID:  - see above    ICU Check List       FEN  Fluids: PRN  Electrolytes: PRN  Nutrition: regular diet  Prophylaxis:  DVT ppx: hold iso anemia  GI ppx: PPI  Bowel care: vanc-chandrakant  Hardware:         CVC 05/17/25 Triple lumen Tunneled Left Internal jugular (Active)   Placement Date/Time: 05/17/25 1500   Lumen Type: Triple lumen  CVC Type: Tunneled  Orientation: Left  Location: Internal jugular   Number of days: 1       Hemodialysis Cath 05/15/25 Double lumen Right Subclavian (Active)   Placement Date/Time: 05/15/25 1400   Hemodialysis Catheter Type: Double lumen  Orientation: Right  Access Location: Subclavian   Number of days: 3       Social:  Code: DNR and No Intubation    HPOA: Shoshana Looney (spouse) [phone]   Disposition: [facility]             Court Garcia MD   05/18/25 at 5:23 PM     Disclaimer: Documentation completed with the information available at the time of input. The times in the chart may not be reflective of actual patient care times, interventions, or procedures. Documentation occurs after the physical care of the patient.        [1] [Held by provider] heparin (porcine), 5,000 Units, subcutaneous, q8h  insulin lispro, 0-5 Units, subcutaneous, q4h  lactulose, 20 g, oral, q8h  meropenem, 500 mg, intravenous, Every Day  tamsulosin, 0.4 mg, oral, Daily  [2] norepinephrine, 0-1 mcg/kg/min, Last Rate: 0.02 mcg/kg/min (05/18/25 8836)  [3] PRN medications: albuterol, oxygen, vancomycin

## 2025-05-19 VITALS
RESPIRATION RATE: 18 BRPM | DIASTOLIC BLOOD PRESSURE: 51 MMHG | OXYGEN SATURATION: 94 % | WEIGHT: 160.94 LBS | HEART RATE: 78 BPM | SYSTOLIC BLOOD PRESSURE: 97 MMHG | BODY MASS INDEX: 23.84 KG/M2 | TEMPERATURE: 98.2 F | HEIGHT: 69 IN

## 2025-05-19 LAB
AFP SERPL-MCNC: <4 NG/ML (ref 0–9)
ALBUMIN SERPL BCP-MCNC: 2.9 G/DL (ref 3.4–5)
ALBUMIN SERPL BCP-MCNC: 3 G/DL (ref 3.4–5)
ALP SERPL-CCNC: 331 U/L (ref 33–136)
ALT SERPL W P-5'-P-CCNC: 371 U/L (ref 10–52)
ANA SER QL HEP2 SUBST: NEGATIVE
ANION GAP BLDV CALCULATED.4IONS-SCNC: 19 MMOL/L (ref 10–25)
ANION GAP SERPL CALC-SCNC: 19 MMOL/L (ref 10–20)
ANION GAP SERPL CALC-SCNC: 20 MMOL/L (ref 10–20)
APPEARANCE UR: ABNORMAL
APTT PPP: 29 SECONDS (ref 26–36)
AST SERPL W P-5'-P-CCNC: 438 U/L (ref 9–39)
BASE EXCESS BLDV CALC-SCNC: -7.9 MMOL/L (ref -2–3)
BASOPHILS # BLD AUTO: 0.01 X10*3/UL (ref 0–0.1)
BASOPHILS NFR BLD AUTO: 0.1 %
BILIRUB DIRECT SERPL-MCNC: 7.5 MG/DL (ref 0–0.3)
BILIRUB SERPL-MCNC: 12.4 MG/DL (ref 0–1.2)
BILIRUB UR STRIP.AUTO-MCNC: ABNORMAL MG/DL
BODY TEMPERATURE: 37 DEGREES CELSIUS
BUN SERPL-MCNC: 80 MG/DL (ref 6–23)
BUN SERPL-MCNC: 92 MG/DL (ref 6–23)
CA-I BLDV-SCNC: 1.13 MMOL/L (ref 1.1–1.33)
CALCIUM SERPL-MCNC: 8.2 MG/DL (ref 8.6–10.6)
CALCIUM SERPL-MCNC: 8.4 MG/DL (ref 8.6–10.6)
CAOX CRY #/AREA UR COMP ASSIST: ABNORMAL /HPF
CARDIAC TROPONIN I PNL SERPL HS: 1066 NG/L (ref 0–53)
CHLORIDE BLDV-SCNC: 102 MMOL/L (ref 98–107)
CHLORIDE SERPL-SCNC: 98 MMOL/L (ref 98–107)
CHLORIDE SERPL-SCNC: 98 MMOL/L (ref 98–107)
CHLORIDE UR-SCNC: 26 MMOL/L
CHLORIDE/CREATININE (MMOL/G) IN URINE: 15 MMOL/G CREAT (ref 23–275)
CO2 SERPL-SCNC: 22 MMOL/L (ref 21–32)
CO2 SERPL-SCNC: 22 MMOL/L (ref 21–32)
COLOR UR: ABNORMAL
CREAT SERPL-MCNC: 8.31 MG/DL (ref 0.5–1.3)
CREAT SERPL-MCNC: 8.96 MG/DL (ref 0.5–1.3)
CREAT UR-MCNC: 175.2 MG/DL (ref 20–370)
CREAT UR-MCNC: 175.2 MG/DL (ref 20–370)
EGFRCR SERPLBLD CKD-EPI 2021: 5 ML/MIN/1.73M*2
EGFRCR SERPLBLD CKD-EPI 2021: 6 ML/MIN/1.73M*2
EOSINOPHIL # BLD AUTO: 0 X10*3/UL (ref 0–0.4)
EOSINOPHIL NFR BLD AUTO: 0 %
ERYTHROCYTE [DISTWIDTH] IN BLOOD BY AUTOMATED COUNT: 24.3 % (ref 11.5–14.5)
GLUCOSE BLD MANUAL STRIP-MCNC: 163 MG/DL (ref 74–99)
GLUCOSE BLD MANUAL STRIP-MCNC: 177 MG/DL (ref 74–99)
GLUCOSE BLD MANUAL STRIP-MCNC: 197 MG/DL (ref 74–99)
GLUCOSE BLD MANUAL STRIP-MCNC: 198 MG/DL (ref 74–99)
GLUCOSE BLD MANUAL STRIP-MCNC: 203 MG/DL (ref 74–99)
GLUCOSE BLDV-MCNC: 182 MG/DL (ref 74–99)
GLUCOSE SERPL-MCNC: 163 MG/DL (ref 74–99)
GLUCOSE SERPL-MCNC: 206 MG/DL (ref 74–99)
GLUCOSE UR STRIP.AUTO-MCNC: ABNORMAL MG/DL
HAV IGM SER QL: NONREACTIVE
HBV CORE AB SER QL: NONREACTIVE
HBV CORE IGM SER QL: NONREACTIVE
HBV CORE IGM SER QL: NONREACTIVE
HBV SURFACE AG SERPL QL IA: NONREACTIVE
HCO3 BLDV-SCNC: 17.6 MMOL/L (ref 22–26)
HCT VFR BLD AUTO: 23.4 % (ref 41–52)
HCT VFR BLD EST: 26 % (ref 41–52)
HCV AB SER QL: NONREACTIVE
HCV AB SER QL: NONREACTIVE
HGB BLD-MCNC: 8 G/DL (ref 13.5–17.5)
HGB BLDV-MCNC: 8.7 G/DL (ref 13.5–17.5)
HOLD SPECIMEN: 293
IMM GRANULOCYTES # BLD AUTO: 0.12 X10*3/UL (ref 0–0.5)
IMM GRANULOCYTES NFR BLD AUTO: 1.4 % (ref 0–0.9)
INHALED O2 CONCENTRATION: 60 %
INR PPP: 1.4 (ref 0.9–1.1)
KETONES UR STRIP.AUTO-MCNC: ABNORMAL MG/DL
LACTATE BLDV-SCNC: 2.7 MMOL/L (ref 0.4–2)
LACTATE SERPL-SCNC: 2.4 MMOL/L (ref 0.4–2)
LEUKOCYTE ESTERASE UR QL STRIP.AUTO: ABNORMAL
LYMPHOCYTES # BLD AUTO: 0.69 X10*3/UL (ref 0.8–3)
LYMPHOCYTES NFR BLD AUTO: 8.1 %
MAGNESIUM SERPL-MCNC: 3.09 MG/DL (ref 1.6–2.4)
MAGNESIUM SERPL-MCNC: 3.1 MG/DL (ref 1.6–2.4)
MCH RBC QN AUTO: 28.6 PG (ref 26–34)
MCHC RBC AUTO-ENTMCNC: 34.2 G/DL (ref 32–36)
MCV RBC AUTO: 84 FL (ref 80–100)
MICROALBUMIN UR-MCNC: 536.3 MG/L
MICROALBUMIN/CREAT UR: 306.1 UG/MG CREAT
MONOCYTES # BLD AUTO: 0.71 X10*3/UL (ref 0.05–0.8)
MONOCYTES NFR BLD AUTO: 8.4 %
MUCOUS THREADS #/AREA URNS AUTO: ABNORMAL /LPF
NEUTROPHILS # BLD AUTO: 6.97 X10*3/UL (ref 1.6–5.5)
NEUTROPHILS NFR BLD AUTO: 82 %
NITRITE UR QL STRIP.AUTO: NEGATIVE
NRBC BLD-RTO: 1.3 /100 WBCS (ref 0–0)
OXYHGB MFR BLDV: 77.8 % (ref 45–75)
PCO2 BLDV: 35 MM HG (ref 41–51)
PH BLDV: 7.31 PH (ref 7.33–7.43)
PH UR STRIP.AUTO: 5.5 [PH]
PHOSPHATE SERPL-MCNC: 5.5 MG/DL (ref 2.5–4.9)
PHOSPHATE SERPL-MCNC: 5.7 MG/DL (ref 2.5–4.9)
PLATELET # BLD AUTO: 83 X10*3/UL (ref 150–450)
PO2 BLDV: 52 MM HG (ref 35–45)
POTASSIUM BLDV-SCNC: 4.4 MMOL/L (ref 3.5–5.3)
POTASSIUM SERPL-SCNC: 4.2 MMOL/L (ref 3.5–5.3)
POTASSIUM SERPL-SCNC: 4.4 MMOL/L (ref 3.5–5.3)
POTASSIUM UR-SCNC: 50 MMOL/L
POTASSIUM/CREAT UR-RTO: 29 MMOL/G CREAT
PROT SERPL-MCNC: 4.1 G/DL (ref 6.4–8.2)
PROT UR STRIP.AUTO-MCNC: ABNORMAL MG/DL
PROTHROMBIN TIME: 15.4 SECONDS (ref 9.8–12.4)
RBC # BLD AUTO: 2.8 X10*6/UL (ref 4.5–5.9)
RBC # UR STRIP.AUTO: ABNORMAL MG/DL
RBC #/AREA URNS AUTO: >20 /HPF
SAO2 % BLDV: 80 % (ref 45–75)
SMOOTH MUSCLE AB SER QL IF: ABNORMAL
SODIUM BLDV-SCNC: 134 MMOL/L (ref 136–145)
SODIUM SERPL-SCNC: 135 MMOL/L (ref 136–145)
SODIUM SERPL-SCNC: 136 MMOL/L (ref 136–145)
SODIUM UR-SCNC: 25 MMOL/L
SODIUM/CREAT UR-RTO: 14 MMOL/G CREAT
SP GR UR STRIP.AUTO: 1.04
SQUAMOUS #/AREA URNS AUTO: ABNORMAL /HPF
UROBILINOGEN UR STRIP.AUTO-MCNC: ABNORMAL MG/DL
VANCOMYCIN TROUGH SERPL-MCNC: 17.1 UG/ML (ref 5–20)
WBC # BLD AUTO: 8.5 X10*3/UL (ref 4.4–11.3)
WBC #/AREA URNS AUTO: ABNORMAL /HPF

## 2025-05-19 PROCEDURE — 82248 BILIRUBIN DIRECT: CPT

## 2025-05-19 PROCEDURE — 80202 ASSAY OF VANCOMYCIN: CPT

## 2025-05-19 PROCEDURE — 2500000004 HC RX 250 GENERAL PHARMACY W/ HCPCS (ALT 636 FOR OP/ED)

## 2025-05-19 PROCEDURE — 82330 ASSAY OF CALCIUM: CPT | Performed by: INTERNAL MEDICINE

## 2025-05-19 PROCEDURE — 2500000001 HC RX 250 WO HCPCS SELF ADMINISTERED DRUGS (ALT 637 FOR MEDICARE OP)

## 2025-05-19 PROCEDURE — 76770 US EXAM ABDO BACK WALL COMP: CPT | Performed by: RADIOLOGY

## 2025-05-19 PROCEDURE — 83605 ASSAY OF LACTIC ACID: CPT

## 2025-05-19 PROCEDURE — 2500000004 HC RX 250 GENERAL PHARMACY W/ HCPCS (ALT 636 FOR OP/ED): Mod: JZ

## 2025-05-19 PROCEDURE — 85610 PROTHROMBIN TIME: CPT

## 2025-05-19 PROCEDURE — XXE2X19 MEASUREMENT OF CARDIAC OUTPUT, COMPUTER-AIDED ASSESSMENT, NEW TECHNOLOGY GROUP 9: ICD-10-PCS

## 2025-05-19 PROCEDURE — 86381 MITOCHONDRIAL ANTIBODY EACH: CPT

## 2025-05-19 PROCEDURE — 86015 ACTIN ANTIBODY EACH: CPT

## 2025-05-19 PROCEDURE — 82390 ASSAY OF CERULOPLASMIN: CPT

## 2025-05-19 PROCEDURE — 83735 ASSAY OF MAGNESIUM: CPT

## 2025-05-19 PROCEDURE — 99232 SBSQ HOSP IP/OBS MODERATE 35: CPT | Performed by: STUDENT IN AN ORGANIZED HEALTH CARE EDUCATION/TRAINING PROGRAM

## 2025-05-19 PROCEDURE — 2500000002 HC RX 250 W HCPCS SELF ADMINISTERED DRUGS (ALT 637 FOR MEDICARE OP, ALT 636 FOR OP/ED)

## 2025-05-19 PROCEDURE — 84100 ASSAY OF PHOSPHORUS: CPT

## 2025-05-19 PROCEDURE — 82105 ALPHA-FETOPROTEIN SERUM: CPT

## 2025-05-19 PROCEDURE — 99232 SBSQ HOSP IP/OBS MODERATE 35: CPT | Performed by: INTERNAL MEDICINE

## 2025-05-19 PROCEDURE — 86038 ANTINUCLEAR ANTIBODIES: CPT

## 2025-05-19 PROCEDURE — 2500000005 HC RX 250 GENERAL PHARMACY W/O HCPCS

## 2025-05-19 PROCEDURE — 80053 COMPREHEN METABOLIC PANEL: CPT

## 2025-05-19 PROCEDURE — 84484 ASSAY OF TROPONIN QUANT: CPT

## 2025-05-19 PROCEDURE — 99291 CRITICAL CARE FIRST HOUR: CPT

## 2025-05-19 PROCEDURE — 37799 UNLISTED PX VASCULAR SURGERY: CPT | Performed by: INTERNAL MEDICINE

## 2025-05-19 PROCEDURE — 82947 ASSAY GLUCOSE BLOOD QUANT: CPT

## 2025-05-19 PROCEDURE — 37799 UNLISTED PX VASCULAR SURGERY: CPT

## 2025-05-19 PROCEDURE — 83605 ASSAY OF LACTIC ACID: CPT | Performed by: INTERNAL MEDICINE

## 2025-05-19 PROCEDURE — 2020000001 HC ICU ROOM DAILY

## 2025-05-19 PROCEDURE — 84295 ASSAY OF SERUM SODIUM: CPT

## 2025-05-19 PROCEDURE — 85025 COMPLETE CBC W/AUTO DIFF WBC: CPT

## 2025-05-19 PROCEDURE — 99233 SBSQ HOSP IP/OBS HIGH 50: CPT | Performed by: STUDENT IN AN ORGANIZED HEALTH CARE EDUCATION/TRAINING PROGRAM

## 2025-05-19 RX ORDER — DIGOXIN 0.25 MG/ML
INJECTION INTRAMUSCULAR; INTRAVENOUS
Status: COMPLETED
Start: 2025-05-19 | End: 2025-05-19

## 2025-05-19 RX ORDER — VANCOMYCIN HYDROCHLORIDE 500 MG/100ML
500 INJECTION, SOLUTION INTRAVENOUS ONCE
Status: COMPLETED | OUTPATIENT
Start: 2025-05-19 | End: 2025-05-19

## 2025-05-19 RX ORDER — DIGOXIN 0.25 MG/ML
125 INJECTION INTRAMUSCULAR; INTRAVENOUS EVERY 6 HOURS
Status: DISCONTINUED | OUTPATIENT
Start: 2025-05-20 | End: 2025-05-20

## 2025-05-19 RX ORDER — DIGOXIN 0.25 MG/ML
250 INJECTION INTRAMUSCULAR; INTRAVENOUS ONCE
Status: COMPLETED | OUTPATIENT
Start: 2025-05-19 | End: 2025-05-19

## 2025-05-19 RX ADMIN — HEPARIN SODIUM 5000 UNITS: 5000 INJECTION, SOLUTION INTRAVENOUS; SUBCUTANEOUS at 21:52

## 2025-05-19 RX ADMIN — INSULIN LISPRO 1 UNITS: 100 INJECTION, SOLUTION INTRAVENOUS; SUBCUTANEOUS at 21:52

## 2025-05-19 RX ADMIN — VANCOMYCIN HYDROCHLORIDE 500 MG: 500 INJECTION, SOLUTION INTRAVENOUS at 21:52

## 2025-05-19 RX ADMIN — NOREPINEPHRINE BITARTRATE 0.04 MCG/KG/MIN: 8 INJECTION, SOLUTION INTRAVENOUS at 02:27

## 2025-05-19 RX ADMIN — SODIUM CHLORIDE, SODIUM LACTATE, POTASSIUM CHLORIDE, AND CALCIUM CHLORIDE 500 ML: 600; 310; 30; 20 INJECTION, SOLUTION INTRAVENOUS at 17:09

## 2025-05-19 RX ADMIN — INSULIN LISPRO 1 UNITS: 100 INJECTION, SOLUTION INTRAVENOUS; SUBCUTANEOUS at 23:15

## 2025-05-19 RX ADMIN — HEPARIN SODIUM 5000 UNITS: 5000 INJECTION, SOLUTION INTRAVENOUS; SUBCUTANEOUS at 12:56

## 2025-05-19 RX ADMIN — INSULIN LISPRO 1 UNITS: 100 INJECTION, SOLUTION INTRAVENOUS; SUBCUTANEOUS at 16:16

## 2025-05-19 RX ADMIN — DIGOXIN 250 MCG: 0.25 INJECTION INTRAMUSCULAR; INTRAVENOUS at 19:49

## 2025-05-19 RX ADMIN — INSULIN LISPRO 2 UNITS: 100 INJECTION, SOLUTION INTRAVENOUS; SUBCUTANEOUS at 12:55

## 2025-05-19 RX ADMIN — INSULIN LISPRO 1 UNITS: 100 INJECTION, SOLUTION INTRAVENOUS; SUBCUTANEOUS at 08:02

## 2025-05-19 RX ADMIN — LACTULOSE 20 G: 10 SOLUTION ORAL at 14:44

## 2025-05-19 RX ADMIN — TAMSULOSIN HYDROCHLORIDE 0.4 MG: 0.4 CAPSULE ORAL at 08:02

## 2025-05-19 RX ADMIN — MEROPENEM 500 MG: 500 INJECTION, POWDER, FOR SOLUTION INTRAVENOUS at 23:45

## 2025-05-19 RX ADMIN — MEROPENEM 500 MG: 500 INJECTION, POWDER, FOR SOLUTION INTRAVENOUS at 02:23

## 2025-05-19 RX ADMIN — NOREPINEPHRINE BITARTRATE 0.12 MCG/KG/MIN: 8 INJECTION, SOLUTION INTRAVENOUS at 20:38

## 2025-05-19 SDOH — ECONOMIC STABILITY: FOOD INSECURITY: WITHIN THE PAST 12 MONTHS, THE FOOD YOU BOUGHT JUST DIDN'T LAST AND YOU DIDN'T HAVE MONEY TO GET MORE.: NEVER TRUE

## 2025-05-19 SDOH — HEALTH STABILITY: MENTAL HEALTH: HOW OFTEN DO YOU HAVE SIX OR MORE DRINKS ON ONE OCCASION?: NEVER

## 2025-05-19 SDOH — ECONOMIC STABILITY: HOUSING INSECURITY: IN THE PAST 12 MONTHS, HOW MANY TIMES HAVE YOU MOVED WHERE YOU WERE LIVING?: 0

## 2025-05-19 SDOH — SOCIAL STABILITY: SOCIAL INSECURITY: WITHIN THE LAST YEAR, HAVE YOU BEEN HUMILIATED OR EMOTIONALLY ABUSED IN OTHER WAYS BY YOUR PARTNER OR EX-PARTNER?: NO

## 2025-05-19 SDOH — HEALTH STABILITY: MENTAL HEALTH: HOW MANY DRINKS CONTAINING ALCOHOL DO YOU HAVE ON A TYPICAL DAY WHEN YOU ARE DRINKING?: 1 OR 2

## 2025-05-19 SDOH — SOCIAL STABILITY: SOCIAL NETWORK: HOW OFTEN DO YOU ATTEND CHURCH OR RELIGIOUS SERVICES?: NEVER

## 2025-05-19 SDOH — SOCIAL STABILITY: SOCIAL INSECURITY: WITHIN THE LAST YEAR, HAVE YOU BEEN AFRAID OF YOUR PARTNER OR EX-PARTNER?: NO

## 2025-05-19 SDOH — HEALTH STABILITY: MENTAL HEALTH: HOW OFTEN DO YOU HAVE A DRINK CONTAINING ALCOHOL?: 2-4 TIMES A MONTH

## 2025-05-19 SDOH — ECONOMIC STABILITY: FOOD INSECURITY: HOW HARD IS IT FOR YOU TO PAY FOR THE VERY BASICS LIKE FOOD, HOUSING, MEDICAL CARE, AND HEATING?: NOT VERY HARD

## 2025-05-19 SDOH — ECONOMIC STABILITY: INCOME INSECURITY: IN THE PAST 12 MONTHS HAS THE ELECTRIC, GAS, OIL, OR WATER COMPANY THREATENED TO SHUT OFF SERVICES IN YOUR HOME?: NO

## 2025-05-19 SDOH — SOCIAL STABILITY: SOCIAL NETWORK: IN A TYPICAL WEEK, HOW MANY TIMES DO YOU TALK ON THE PHONE WITH FAMILY, FRIENDS, OR NEIGHBORS?: TWICE A WEEK

## 2025-05-19 SDOH — HEALTH STABILITY: PHYSICAL HEALTH: ON AVERAGE, HOW MANY MINUTES DO YOU ENGAGE IN EXERCISE AT THIS LEVEL?: 0 MIN

## 2025-05-19 SDOH — ECONOMIC STABILITY: FOOD INSECURITY: WITHIN THE PAST 12 MONTHS, YOU WORRIED THAT YOUR FOOD WOULD RUN OUT BEFORE YOU GOT THE MONEY TO BUY MORE.: NEVER TRUE

## 2025-05-19 SDOH — SOCIAL STABILITY: SOCIAL INSECURITY: ARE YOU MARRIED, WIDOWED, DIVORCED, SEPARATED, NEVER MARRIED, OR LIVING WITH A PARTNER?: MARRIED

## 2025-05-19 SDOH — SOCIAL STABILITY: SOCIAL NETWORK
DO YOU BELONG TO ANY CLUBS OR ORGANIZATIONS SUCH AS CHURCH GROUPS, UNIONS, FRATERNAL OR ATHLETIC GROUPS, OR SCHOOL GROUPS?: NO

## 2025-05-19 SDOH — ECONOMIC STABILITY: HOUSING INSECURITY: IN THE LAST 12 MONTHS, WAS THERE A TIME WHEN YOU WERE NOT ABLE TO PAY THE MORTGAGE OR RENT ON TIME?: NO

## 2025-05-19 SDOH — ECONOMIC STABILITY: TRANSPORTATION INSECURITY: IN THE PAST 12 MONTHS, HAS LACK OF TRANSPORTATION KEPT YOU FROM MEDICAL APPOINTMENTS OR FROM GETTING MEDICATIONS?: NO

## 2025-05-19 SDOH — HEALTH STABILITY: PHYSICAL HEALTH
HOW OFTEN DO YOU NEED TO HAVE SOMEONE HELP YOU WHEN YOU READ INSTRUCTIONS, PAMPHLETS, OR OTHER WRITTEN MATERIAL FROM YOUR DOCTOR OR PHARMACY?: NEVER

## 2025-05-19 SDOH — ECONOMIC STABILITY: HOUSING INSECURITY: AT ANY TIME IN THE PAST 12 MONTHS, WERE YOU HOMELESS OR LIVING IN A SHELTER (INCLUDING NOW)?: NO

## 2025-05-19 SDOH — HEALTH STABILITY: MENTAL HEALTH
DO YOU FEEL STRESS - TENSE, RESTLESS, NERVOUS, OR ANXIOUS, OR UNABLE TO SLEEP AT NIGHT BECAUSE YOUR MIND IS TROUBLED ALL THE TIME - THESE DAYS?: ONLY A LITTLE

## 2025-05-19 SDOH — HEALTH STABILITY: PHYSICAL HEALTH: ON AVERAGE, HOW MANY DAYS PER WEEK DO YOU ENGAGE IN MODERATE TO STRENUOUS EXERCISE (LIKE A BRISK WALK)?: 0 DAYS

## 2025-05-19 SDOH — SOCIAL STABILITY: SOCIAL NETWORK: HOW OFTEN DO YOU GET TOGETHER WITH FRIENDS OR RELATIVES?: TWICE A WEEK

## 2025-05-19 SDOH — SOCIAL STABILITY: SOCIAL NETWORK: HOW OFTEN DO YOU ATTEND MEETINGS OF THE CLUBS OR ORGANIZATIONS YOU BELONG TO?: NEVER

## 2025-05-19 ASSESSMENT — PAIN SCALES - GENERAL
PAINLEVEL_OUTOF10: 0 - NO PAIN

## 2025-05-19 ASSESSMENT — PAIN - FUNCTIONAL ASSESSMENT
PAIN_FUNCTIONAL_ASSESSMENT: 0-10

## 2025-05-19 ASSESSMENT — ACTIVITIES OF DAILY LIVING (ADL)
LACK_OF_TRANSPORTATION: NO
LACK_OF_TRANSPORTATION: NO

## 2025-05-19 ASSESSMENT — LIFESTYLE VARIABLES
AUDIT-C TOTAL SCORE: 2
SKIP TO QUESTIONS 9-10: 1

## 2025-05-19 NOTE — CONSULTS
"Nutrition Initial Assessment:   Nutrition Assessment    Reason for Assessment: Admission nursing screening, Enteral assessment/recommendation (TF)    Patient is a 84 y.o. male presenting as a transfer from OSH for higher level of care in setting of undifferentiated shock with possible new cirrhosis vs new liver CA.  He is on levo for pressure support.  He was initially admitted to an OSH on 5/5 with abdominal pain, N/V/D.  Course c/b MARY and hyperkalemia requiring CVVH.      Past medical history includes DM, hyperlipidemia, HTN    In speaking with RN, does not sound as though plan is to place enteral access on patient at this time.     Nutrition History:  Food and Nutrient History: Met with patient and his wife and son.  Family provided most of the info.  Wife reports that he has always been a picky eater but that lately he has not had an appetite and been eating very little.  Wife reports that right before coming into the hospital, she made his favorite meal but he only had 6 pieces of rigatoni.  They deny N/V but endorse diarrhea.  They also endorse trouble chewing and swallowing.  Wife thinks it has to do with weight loss and his dentures not fitting well, especially on the bottomr.  They have tried to get him to drink Ensure, even milkshakes made with Ensure, but he dislikes them.  He is down in weight but they are unsure how much-- see below for details.  They are open to having him try supplements like Ensure Clear and Magic Cup while admitted.  Son asking about IV nutrition.  RDN discussed preference for enteral nutrition over IV nutrition.  Family does not think he would like a feeding tube.      Anthropometrics:  Height: 175.3 cm (5' 9\")   Weight: 73 kg (160 lb 15 oz)   BMI (Calculated): 23.76  IBW/kg (Dietitian Calculated): 72.7 kg  Percent of IBW: 100 %     Weight History:     Wt Readings from Last 6 Encounters:   05/18/25 73 kg (160 lb 15 oz)   05/14/25 70 kg (154 lb 5.2 oz)   09/26/24 74.8 kg (165 lb) " "  09/10/24 73.5 kg (162 lb)     Family thinks that pt normally weighs somewhere between 72.7kg-75kg.  They think he is closer to the 59kg range now.  Suspect current weight is not accurate based on NFPE.  He is possible down 18-21%.     Weight Change %:       Nutrition Focused Physical Exam Findings:    Subcutaneous Fat Loss:   Orbital Fat Pads: Severe (dark circles, hollowing and loose skin)  Buccal Fat Pads: Severe (hollow, sunken and narrow face)  Triceps: Severe (negligible fat tissue)  Muscle Wasting:  Temporalis: Severe (hollowed scooping depression)  Pectoralis (Clavicular Region): Severe (protruding prominent clavicle)  Deltoid/Trapezius: Severe (squared shoulders, acromion process prominent)  Quadriceps: Severe (depressions on inner and outer thigh)  Gastrocnemius: Severe (minimal muscle definition)  Edema:  Edema Location: non-pitting edema  Physical Findings:  Skin: Negative    Nutrition Significant Labs:  A1C:  Lab Results   Component Value Date    HGBA1C 5.4 05/17/2025   , BG POCT trend:   Results from last 7 days   Lab Units 05/19/25  0652 05/18/25  2039 05/18/25  1621 05/18/25  1205 05/18/25  0838   POCT GLUCOSE mg/dL 163* 146* 141* 151* 147*    , Liver Function Trend:   Results from last 7 days   Lab Units 05/19/25  0459 05/18/25  0543 05/17/25  2216   ALK PHOS U/L 331* 318* 351*   AST U/L 438* 448* 498*   ALT U/L 371* 453* 493*   BILIRUBIN TOTAL mg/dL 12.4* 9.9* 9.9*    , Renal Lab Trend:   Results from last 7 days   Lab Units 05/19/25  0459 05/18/25  0543 05/17/25  2216   POTASSIUM mmol/L 4.4 3.8 3.7   PHOSPHORUS mg/dL 5.7* 5.1* 4.8   SODIUM mmol/L 136 140 139   MAGNESIUM mg/dL 3.10* 2.96* 3.03*   EGFR mL/min/1.73m*2 6* 8* 8*   BUN mg/dL 80* 69* 67*   CREATININE mg/dL 8.31* 6.60* 6.33*    , Vit D: No results found for: \"VITD25\"     Nutrition Specific Medications:  Scheduled medications  [Held by provider] heparin (porcine), 5,000 Units, subcutaneous, q8h  insulin lispro, 0-5 Units, subcutaneous, " q4h  lactulose, 20 g, oral, q8h  meropenem, 500 mg, intravenous, Every Day  tamsulosin, 0.4 mg, oral, Daily      Continuous medications  norepinephrine, 0-1 mcg/kg/min, Last Rate: 0.08 mcg/kg/min (05/19/25 0700)      PRN medications  PRN Medications[1]     I/O:   Last BM Date: 05/19/25; Stool Appearance: Liquid, Mucous (05/19/25 0300)        Dietary Orders (From admission, onward)       Start     Ordered    05/18/25 1616  Adult diet Regular, Renal; Potassium Restricted 2 gm (50mEq); 2 - 3 grams Sodium  Diet effective now        Question Answer Comment   Diet type Regular    Diet type Renal    Potassium restriction: Potassium Restricted 2 gm (50mEq)    Sodium restriction: 2 - 3 grams Sodium        05/18/25 1616    05/17/25 2107  May Participate in Room Service  ( ROOM SERVICE MAY PARTICIPATE)  Once        Question:  .  Answer:  Yes    05/17/25 2106                     Estimated Needs:   Total Energy Estimated Needs in 24 hours (kCal):  (9580-9790)  Method for Estimating Needs: MSJ= 1417 using current anthropometrics  Total Protein Estimated Needs in 24 Hours (g): 110 g  Method for Estimating 24 Hour Protein Needs: 1.5 x 72.7kg            Nutrition Diagnosis   Malnutrition Diagnosis  Patient has Malnutrition Diagnosis: Yes  Diagnosis Status: New  Malnutrition Diagnosis: Severe malnutrition related to acute disease or injury  As Evidenced by: family reports poor appetite and intake for some time (unclear exactly how long) with noted severe fat and muscle loss on physical exam and a suspected weight loss of anywhere between 18-21%.          Nutrition Interventions/Recommendations         Nutrition Prescription:   Consider formal SLP eval with family concerns for difficulty chewing, ?swallowing.   RDN to liberalize diet to Regular.   If plan becomes Cortrak tube, reconsult this service for recommendations.   Check Vitamin D level.          Nutrition Interventions:    RDN to order Ensure Clear (240kcals, 8grams protein  each) and Magic Cup (290kcals, 9grams protein each)       Nutrition Education:   Not appropriate       Nutrition Monitoring and Evaluation   Food/Nutrient Related History Monitoring  Monitoring and Evaluation Plan: Estimated Energy Intake  Estimated Energy Intake: Energy intake 50 -75% of estimated energy needs           Time Spent (min): 60 minutes                 [1] PRN medications: albuterol, oxygen, vancomycin

## 2025-05-19 NOTE — CARE PLAN
The patient's goals for the shift include    Problem: Safety - Adult  Goal: Free from fall injury  Outcome: Progressing     Problem: Fall/Injury  Goal: Be free from injury by end of the shift  Outcome: Progressing     Problem: Skin  Goal: Decreased wound size/increased tissue granulation at next dressing change  Outcome: Progressing  Flowsheets (Taken 5/19/2025 1723)  Decreased wound size/increased tissue granulation at next dressing change:   Promote sleep for wound healing   Protective dressings over bony prominences  Goal: Participates in plan/prevention/treatment measures  Outcome: Progressing  Flowsheets (Taken 5/19/2025 1723)  Participates in plan/prevention/treatment measures:   Discuss with provider PT/OT consult   Elevate heels  Goal: Prevent/manage excess moisture  Outcome: Progressing  Flowsheets (Taken 5/19/2025 1723)  Prevent/manage excess moisture:   Cleanse incontinence/protect with barrier cream   Moisturize dry skin   Follow provider orders for dressing changes   Monitor for/manage infection if present  Goal: Prevent/minimize sheer/friction injuries  Outcome: Progressing  Flowsheets (Taken 5/19/2025 1723)  Prevent/minimize sheer/friction injuries:   Complete micro-shifts as needed if patient unable. Adjust patient position to relieve pressure points, not a full turn   HOB 30 degrees or less   Increase activity/out of bed for meals   Turn/reposition every 2 hours/use positioning/transfer devices   Use pull sheet  Goal: Promote/optimize nutrition  Outcome: Progressing  Flowsheets (Taken 5/19/2025 1723)  Promote/optimize nutrition:   Offer water/supplements/favorite foods   Consume > 50% meals/supplements   Monitor/record intake including meals  Goal: Promote skin healing  Outcome: Progressing  Flowsheets (Taken 5/19/2025 1723)  Promote skin healing:   Assess skin/pad under line(s)/device(s)   Ensure correct size (line/device) and apply per  instructions   Protective dressings over bony  prominences   Rotate device position/do not position patient on device   Turn/reposition every 2 hours/use positioning/transfer devices       The clinical goals for the shift include Pt will be weaned down on Levophed today.    Unable to wean down on the Levophed. Pt also needed a fluid bolus.

## 2025-05-19 NOTE — CARE PLAN
The patient's goals for the shift include      The clinical goals for the shift include patient will remain hemodynamically stable throughout shift      Problem: Pain - Adult  Goal: Verbalizes/displays adequate comfort level or baseline comfort level  Outcome: Progressing     Problem: Safety - Adult  Goal: Free from fall injury  Outcome: Progressing     Problem: Discharge Planning  Goal: Discharge to home or other facility with appropriate resources  Outcome: Progressing     Problem: Chronic Conditions and Co-morbidities  Goal: Patient's chronic conditions and co-morbidity symptoms are monitored and maintained or improved  Flowsheets (Taken 5/19/2025 8642)  Care Plan - Patient's Chronic Conditions and Co-Morbidity Symptoms are Monitored and Maintained or Improved: Collaborate with multidisciplinary team to address chronic and comorbid conditions and prevent exacerbation or deterioration     Problem: Nutrition  Goal: Nutrient intake appropriate for maintaining nutritional needs  Outcome: Progressing     Problem: Fall/Injury  Goal: Not fall by end of shift  Outcome: Progressing  Goal: Be free from injury by end of the shift  Outcome: Progressing  Goal: Verbalize understanding of personal risk factors for fall in the hospital  Outcome: Progressing     Problem: Pain  Goal: Takes deep breaths with improved pain control throughout the shift  Outcome: Progressing  Goal: Turns in bed with improved pain control throughout the shift  Outcome: Progressing     Problem: Fall/Injury  Goal: Not fall by end of shift  Outcome: Progressing  Goal: Be free from injury by end of the shift  Outcome: Progressing  Goal: Verbalize understanding of personal risk factors for fall in the hospital  Outcome: Progressing     Problem: Skin  Goal: Decreased wound size/increased tissue granulation at next dressing change  Outcome: Progressing  Flowsheets (Taken 5/19/2025 9589)  Decreased wound size/increased tissue granulation at next dressing  change: Protective dressings over bony prominences  Goal: Participates in plan/prevention/treatment measures  Outcome: Progressing  Flowsheets (Taken 5/19/2025 0418)  Participates in plan/prevention/treatment measures: Elevate heels  Goal: Prevent/manage excess moisture  Outcome: Progressing  Goal: Prevent/minimize sheer/friction injuries  Outcome: Progressing  Flowsheets (Taken 5/19/2025 0418)  Prevent/minimize sheer/friction injuries: Turn/reposition every 2 hours/use positioning/transfer devices  Goal: Promote/optimize nutrition  Outcome: Progressing  Goal: Promote skin healing  Outcome: Progressing     Problem: Pain  Goal: Takes deep breaths with improved pain control throughout the shift  Outcome: Progressing  Goal: Turns in bed with improved pain control throughout the shift  Outcome: Progressing

## 2025-05-19 NOTE — PROGRESS NOTES
Ramana Looney   84 angie    @WT@  N/Room: 20371545/28/28-A    Subjective:   No acute event overnight.  IVC collapsible on POCUS  In NSR now.  No nausea, vomiting.No SOB.  Renal function worsening       Objective:     Meds:   heparin (porcine), 5,000 Units, q8h  insulin lispro, 0-5 Units, q4h  lactulose, 20 g, q8h  meropenem, 500 mg, Every Day  tamsulosin, 0.4 mg, Daily      norepinephrine, Last Rate: 0.12 mcg/kg/min (05/19/25 1245)      albuterol, 2.5 mg, q4h PRN  oxygen, , Continuous PRN - O2/gases  vancomycin, , Daily PRN        Vitals:    05/19/25 1500   BP: (!) 94/43   Pulse: 76   Resp: 14   Temp:    SpO2: 96%          Intake/Output Summary (Last 24 hours) at 5/19/2025 1526  Last data filed at 5/19/2025 1500  Gross per 24 hour   Intake 427.62 ml   Output 350 ml   Net 77.62 ml       General appearance: no distress  Eyes: non-icteric  Skin: no apparent rash  Heart: [id number]  Lungs: CTA bilat mild crackles  Abdomen: soft, nt/nd  Extremities: trace edema bilat  Grant  Neuro: No FND, no asterixis  Access right Ij    Blood Labs:  Results for orders placed or performed during the hospital encounter of 05/17/25 (from the past 24 hours)   POCT GLUCOSE   Result Value Ref Range    POCT Glucose 141 (H) 74 - 99 mg/dL   Troponin I, High Sensitivity   Result Value Ref Range    Troponin I, High Sensitivity (CMC) 1,459 (HH) 0 - 53 ng/L   Blood Gas Venous Full Panel   Result Value Ref Range    POCT pH, Venous 7.33 7.33 - 7.43 pH    POCT pCO2, Venous 35 (L) 41 - 51 mm Hg    POCT pO2, Venous 54 (H) 35 - 45 mm Hg    POCT SO2, Venous 80 (H) 45 - 75 %    POCT Oxy Hemoglobin, Venous 78.0 (H) 45.0 - 75.0 %    POCT Hematocrit Calculated, Venous 25.0 (L) 41.0 - 52.0 %    POCT Sodium, Venous 135 (L) 136 - 145 mmol/L    POCT Potassium, Venous 4.4 3.5 - 5.3 mmol/L    POCT Chloride, Venous 103 98 - 107 mmol/L    POCT Ionized Calicum, Venous 1.12 1.10 - 1.33 mmol/L    POCT Glucose, Venous 150 (H) 74 - 99 mg/dL    POCT Lactate, Venous 2.6  (H) 0.4 - 2.0 mmol/L    POCT Base Excess, Venous -6.8 (L) -2.0 - 3.0 mmol/L    POCT HCO3 Calculated, Venous 18.5 (L) 22.0 - 26.0 mmol/L    POCT Hemoglobin, Venous 8.4 (L) 13.5 - 17.5 g/dL    POCT Anion Gap, Venous 18.0 10.0 - 25.0 mmol/L    Patient Temperature 37.0 degrees Celsius    FiO2 60 %   Vancomycin   Result Value Ref Range    Vancomycin 14.8 5.0 - 20.0 ug/mL   POCT GLUCOSE   Result Value Ref Range    POCT Glucose 146 (H) 74 - 99 mg/dL   Urine electrolytes   Result Value Ref Range    Sodium, Urine Random 25 mmol/L    Sodium/Creatinine Ratio 14 Not established. mmol/g Creat    Potassium, Urine Random 50 mmol/L    Potassium/Creatinine Ratio 29 Not established mmol/g Creat    Chloride, Urine Random 26 mmol/L    Chloride/Creatinine Ratio 15 (L) 23 - 275 mmol/g creat    Creatinine, Urine Random 175.2 20.0 - 370.0 mg/dL   Albumin-Creatinine Ratio, Urine Random   Result Value Ref Range    Albumin, Urine Random 536.3 Not established mg/L    Creatinine, Urine Random 175.2 20.0 - 370.0 mg/dL    Albumin/Creatinine Ratio 306.1 (H) <30.0 ug/mg Creat   Urinalysis with Reflex Culture and Microscopic   Result Value Ref Range    Color, Urine Dark-Yellow Light-Yellow, Yellow, Dark-Yellow    Appearance, Urine Turbid (N) Clear    Specific Gravity, Urine 1.037 (N) 1.005 - 1.035    pH, Urine 5.5 5.0, 5.5, 6.0, 6.5, 7.0, 7.5, 8.0    Protein, Urine 100 (2+) (A) NEGATIVE, 10 (TRACE), 20 (TRACE) mg/dL    Glucose, Urine 70 (1+) (A) Normal mg/dL    Blood, Urine 0.06 (1+) (A) NEGATIVE mg/dL    Ketones, Urine TRACE (A) NEGATIVE mg/dL    Bilirubin, Urine 1 (1+) (A) NEGATIVE mg/dL    Urobilinogen, Urine 2 (1+) (A) Normal mg/dL    Nitrite, Urine NEGATIVE NEGATIVE    Leukocyte Esterase, Urine 25 João/uL (A) NEGATIVE   Extra Urine Gray Tube   Result Value Ref Range    Extra Tube 293    Microscopic Only, Urine   Result Value Ref Range    WBC, Urine 1-5 1-5, NONE /HPF    RBC, Urine >20 (A) NONE, 1-2, 3-5 /HPF    Squamous Epithelial Cells, Urine  1-9 (SPARSE) Reference range not established. /HPF    Mucus, Urine 1+ Reference range not established. /LPF    Calcium Oxalate Crystals, Urine 2+ (A) NONE, 1+ /HPF   Hepatic function panel   Result Value Ref Range    Albumin 2.9 (L) 3.4 - 5.0 g/dL    Bilirubin, Total 12.4 (H) 0.0 - 1.2 mg/dL    Bilirubin, Direct 7.5 (H) 0.0 - 0.3 mg/dL    Alkaline Phosphatase 331 (H) 33 - 136 U/L     (H) 10 - 52 U/L     (H) 9 - 39 U/L    Total Protein 4.1 (L) 6.4 - 8.2 g/dL   CBC and Auto Differential   Result Value Ref Range    WBC 8.5 4.4 - 11.3 x10*3/uL    nRBC 1.3 (H) 0.0 - 0.0 /100 WBCs    RBC 2.80 (L) 4.50 - 5.90 x10*6/uL    Hemoglobin 8.0 (L) 13.5 - 17.5 g/dL    Hematocrit 23.4 (L) 41.0 - 52.0 %    MCV 84 80 - 100 fL    MCH 28.6 26.0 - 34.0 pg    MCHC 34.2 32.0 - 36.0 g/dL    RDW 24.3 (H) 11.5 - 14.5 %    Platelets 83 (L) 150 - 450 x10*3/uL    Neutrophils % 82.0 40.0 - 80.0 %    Immature Granulocytes %, Automated 1.4 (H) 0.0 - 0.9 %    Lymphocytes % 8.1 13.0 - 44.0 %    Monocytes % 8.4 2.0 - 10.0 %    Eosinophils % 0.0 0.0 - 6.0 %    Basophils % 0.1 0.0 - 2.0 %    Neutrophils Absolute 6.97 (H) 1.60 - 5.50 x10*3/uL    Immature Granulocytes Absolute, Automated 0.12 0.00 - 0.50 x10*3/uL    Lymphocytes Absolute 0.69 (L) 0.80 - 3.00 x10*3/uL    Monocytes Absolute 0.71 0.05 - 0.80 x10*3/uL    Eosinophils Absolute 0.00 0.00 - 0.40 x10*3/uL    Basophils Absolute 0.01 0.00 - 0.10 x10*3/uL   Magnesium   Result Value Ref Range    Magnesium 3.10 (H) 1.60 - 2.40 mg/dL   Coagulation Screen   Result Value Ref Range    Protime 15.4 (H) 9.8 - 12.4 seconds    INR 1.4 (H) 0.9 - 1.1    aPTT 29 26 - 36 seconds   Phosphorus   Result Value Ref Range    Phosphorus 5.7 (H) 2.5 - 4.9 mg/dL   Basic Metabolic Panel   Result Value Ref Range    Glucose 163 (H) 74 - 99 mg/dL    Sodium 136 136 - 145 mmol/L    Potassium 4.4 3.5 - 5.3 mmol/L    Chloride 98 98 - 107 mmol/L    Bicarbonate 22 21 - 32 mmol/L    Anion Gap 20 10 - 20 mmol/L    Urea  Nitrogen 80 (H) 6 - 23 mg/dL    Creatinine 8.31 (H) 0.50 - 1.30 mg/dL    eGFR 6 (L) >60 mL/min/1.73m*2    Calcium 8.2 (L) 8.6 - 10.6 mg/dL   Alpha-Fetoprotein   Result Value Ref Range    Alpha-Fetoprotein <4 0 - 9 ng/mL   POCT GLUCOSE   Result Value Ref Range    POCT Glucose 163 (H) 74 - 99 mg/dL   POCT GLUCOSE   Result Value Ref Range    POCT Glucose 203 (H) 74 - 99 mg/dL   Transthoracic Echo Complete   Result Value Ref Range    BSA 1.89 m2              ASSESSMENT:  #MARY on CKD 3- likely multifactorial- pre-renal in setting of diarrhea, ATN from recurrent hypotension OSH requiring HD, HRS is one of the differential   -acidbase are stable at the moment.  -Electrolytes are acceptable.  -Baseline cr 1.4-1.5  -Hemodynamically -on levo  -UOP nil   -FeNa 10.1  -UA RbCs, oxalate crystals, Bilirubin, 2+ proteins,   -US renal is pending     Recommendations:  -Will hold dialysis for now.Eval daily for RRT needs.  -Strict I/O.  -Dose all medication to GFR.  -Avoid Nephrotoxins, contrast and keep SBP>90mmHg  -Will follow.             Jenna Reyna MD  Nephrology Fellow   Daytime / Weekend Renal Pager [phone]  After 7 pm Emergencies [phone] Pager [phone]

## 2025-05-19 NOTE — PROGRESS NOTES
05/19/25 1515   Discharge Planning   Living Arrangements Spouse/significant other   Support Systems Spouse/significant other;Children   Assistance Needed None   Type of Residence Private residence   Number of Stairs to Enter Residence 5   Number of Stairs Within Residence 0   Do you have animals or pets at home? No   Who is requesting discharge planning? Provider   Home or Post Acute Services In home services   Type of Home Care Services Home PT;Home OT   Does the patient need discharge transport arranged? No   Financial Resource Strain   How hard is it for you to pay for the very basics like food, housing, medical care, and heating? Not very   Housing Stability   In the last 12 months, was there a time when you were not able to pay the mortgage or rent on time? N   In the past 12 months, how many times have you moved where you were living? 0   At any time in the past 12 months, were you homeless or living in a shelter (including now)? N   Transportation Needs   In the past 12 months, has lack of transportation kept you from medical appointments or from getting medications? no   In the past 12 months, has lack of transportation kept you from meetings, work, or from getting things needed for daily living? No   Patient Choice   Patient / Family choosing to utilize agency / facility established prior to hospitalization No   Stroke Family Assessment   Stroke Family Assessment Needed No   Intensity of Service   Intensity of Service 0-30 min     Ramana Looney is an 84-year-old male admitted to the MICU on May 17, 2025, for the management of undifferentiated shock requiring vasopressor support and acute hypoxemic respiratory failure necessitating high-flow nasal cannula (HFNC). According to transfer records from the outside hospital, the patient experienced fluctuating lactic acid levels during his hospitalization, with a peak level of 14, associated with a gap acidosis.    Upon arrival to the MICU, the patient was  awake, alert, following commands, and not in acute distress. The transport team reported brief episodes of tachyarrhythmia lasting approximately two minutes during transit, which resolved spontaneously. The patient denies fever, headache, nausea, vomiting, and shortness of breath, chest pain, palpitations, or abdominal pain.    This LSW met with the patient at bedside along with his wife, Shoshana ([phone]), and son, Fahad ([phone]), to complete a social work assessment. The family reported that the patient resides in a single-family, ranch-style home with five exterior steps leading to the entrance and no interior stairs. Prior to admission, Mr. Looney was independent in his activities of daily living (ADLs) and required minimal assistance with ambulation. He utilizes a walker, cane, crutches, high-rise toilet seat, and a shower bench for mobility and safety.    Approximately three years ago, Mr. Looney underwent knee surgery at [facility] in [place], [place], followed by outpatient and in-home occupational and physical therapy. The name of the home health provider could not be recalled by the wife.  The patient has a strong support system, including his wife, son, grandchildren, and extended family members. He is followed regularly by his primary care physician, Dr. Geraldine Ayoub, and obtains his medications from [facility]. The patient's ADOD and Dispo is TBD.

## 2025-05-19 NOTE — DOCUMENTATION CLARIFICATION NOTE
"    PATIENT:               JUAN GEIGER  ACCT #:                  [id number]  MRN:                       [id number]  :                       1940  ADMIT DATE:       2025 8:53 PM  DISCH DATE:  RESPONDING PROVIDER #:        [id number]          PROVIDER RESPONSE TEXT:    I agree with dietician diagnosis of Severe Malnutrition on 25    CDI QUERY TEXT:    Clarification      Instruction:    Based on your assessment of the patient and the clinical information, please provide the requested documentation by clicking on the appropriate radio button and enter any additional information if prompted.    Question: Please further clarify this patient nutritional status as    When answering this query, please exercise your independent professional judgment. The fact that a question is being asked, does not imply that any particular answer is desired or expected.    The patient's clinical indicators include:  Clinical Information: From DPN on 25- \"Pt is a  84 y.o. male with known diabetes, hyperlipidemia and hypertension who is a transfer from [facility] who initially presented with abdominal pain and diarrhea.Patient was then transferred to  for higher level of care for his undifferentiated shock.\"    Clinical Indicators:    From Nutrtion Consult on 25- \"Nutrition Focused Physical Exam Findings:    Subcutaneous Fat Loss:  Orbital Fat Pads: Severe (dark circles, hollowing and loose skin)  Buccal Fat Pads: Severe (hollow, sunken and narrow face)  Triceps: Severe (negligible fat tissue)  Muscle Wasting:  Temporalis: Severe (hollowed scooping depression)  Pectoralis (Clavicular Region): Severe (protruding prominent clavicle)  Deltoid/Trapezius: Severe (squared shoulders, acromion process prominent)  Quadriceps: Severe (depressions on inner and outer thigh)  Gastrocnemius: Severe (minimal muscle definition)    \"Malnutrition Diagnosis  Patient has Malnutrition Diagnosis: Yes  Diagnosis Status: " "New  Malnutrition Diagnosis: Severe malnutrition related to acute disease or injury  As Evidenced by: family reports poor appetite and intake for some time (unclear exactly how long) with noted severe fat and muscle loss on physical exam and a suspected weight loss of anywhere between 18-21 percent\".    Treatment: Nutrition Interventions- \"RDN to order Ensure Clear (240kcals, 8grams protein each) and Magic Cup (290kcals, 9grams protein each)\"    Risk Factors: Age, CKD, HTN, Liver Nodules  Options provided:  -- I agree with dietician diagnosis of Severe Malnutrition on 5/19/25  -- Other - I will add my own diagnosis  -- Refer to Clinical Documentation Reviewer    Query created by: Dotty Gale on 5/19/2025 2:30 PM      Electronically signed by:  HANG GARCIA MD 5/19/2025 6:55 PM          "

## 2025-05-19 NOTE — PROGRESS NOTES
05/19/25 1515   Discharge Planning   Living Arrangements Spouse/significant other   Support Systems Spouse/significant other;Children   Assistance Needed None   Type of Residence Private residence   Number of Stairs to Enter Residence 5   Number of Stairs Within Residence 0   Do you have animals or pets at home? No   Who is requesting discharge planning? Provider   Home or Post Acute Services In home services   Type of Home Care Services Home PT;Home OT   Does the patient need discharge transport arranged? No   Financial Resource Strain   How hard is it for you to pay for the very basics like food, housing, medical care, and heating? Not very   Housing Stability   In the last 12 months, was there a time when you were not able to pay the mortgage or rent on time? N   In the past 12 months, how many times have you moved where you were living? 0   At any time in the past 12 months, were you homeless or living in a shelter (including now)? N   Transportation Needs   In the past 12 months, has lack of transportation kept you from medical appointments or from getting medications? no   In the past 12 months, has lack of transportation kept you from meetings, work, or from getting things needed for daily living? No   Patient Choice   Patient / Family choosing to utilize agency / facility established prior to hospitalization No   Stroke Family Assessment   Stroke Family Assessment Needed No   Intensity of Service   Intensity of Service 0-30 min

## 2025-05-19 NOTE — PROGRESS NOTES
"Medical Intensive Care - Daily Progress Note   Subjective    Ramana Looney is a 84 y.o. year old male patient admitted on 5/17/2025 with following ICU needs: undifferentiated shock requiring pressors, acute hypoxemic respiratory failure requiring HFNC.    Interval History:  - One run of Atypical flutter ~ 5 sec, some smaller runs overnight, was started on levophed yesterday evening  - Examined this AM, Aox2, comfortable in bed. On levophed 0.08-->0.1. Denies CP or palpitations, overall feeling ok outside of mild SOB.    Meds    Scheduled medications  Scheduled Medications[1]  Continuous medications  Continuous Medications[2]  PRN medications  PRN Medications[3]     Objective    Blood pressure (!) 100/44, pulse 72, temperature 36.8 °C (98.2 °F), resp. rate 14, height 1.753 m (5' 9\"), weight 73 kg (160 lb 15 oz), SpO2 96%.     Physical Exam  Constitutional:       General: He is not in acute distress.  HENT:      Head: Normocephalic and atraumatic.   Cardiovascular:      Rate and Rhythm: Normal rate and regular rhythm.      Pulses: Normal pulses.      Heart sounds: Normal heart sounds.   Pulmonary:      Effort: Pulmonary effort is normal.      Comments: Diminished bilaterally R>L  Abdominal:      General: There is distension.      Palpations: Abdomen is soft.   Musculoskeletal:      Right lower leg: No edema.      Left lower leg: No edema.   Skin:     General: Skin is warm and dry.   Neurological:      Mental Status: He is alert. He is disoriented.      Comments: Aox2 (person, place)   Psychiatric:         Mood and Affect: Mood normal.         Behavior: Behavior normal.            Intake/Output Summary (Last 24 hours) at 5/19/2025 0652  Last data filed at 5/19/2025 0300  Gross per 24 hour   Intake 775.07 ml   Output 200 ml   Net 575.07 ml     Labs:   Results from last 72 hours   Lab Units 05/18/25  0543 05/17/25  2216   SODIUM mmol/L 140 139   POTASSIUM mmol/L 3.8 3.7   CHLORIDE mmol/L 101 98   CO2 mmol/L 21 21   BUN " mg/dL 69* 67*   CREATININE mg/dL 6.60* 6.33*   GLUCOSE mg/dL 154* 230*   CALCIUM mg/dL 8.1* 8.3*   ANION GAP mmol/L 22* 24*   EGFR mL/min/1.73m*2 8* 8*   PHOSPHORUS mg/dL 5.1* 4.8      Results from last 72 hours   Lab Units 05/19/25  0459 05/18/25  1059 05/18/25  0543   WBC AUTO x10*3/uL 8.5 8.0 8.4   HEMOGLOBIN g/dL 8.0* 7.5* 6.4*   HEMATOCRIT % 23.4* 23.9* 19.9*   PLATELETS AUTO x10*3/uL 83* 85* 88*   NEUTROS PCT AUTO % 82.0 83.1 82.0   LYMPHS PCT AUTO % 8.1 7.3 8.3   MONOS PCT AUTO % 8.4 7.7 7.8   EOS PCT AUTO % 0.0 0.2 0.1          Results from last 72 hours   Lab Units 05/18/25  1816 05/18/25  1059   POCT PH, VENOUS pH 7.33 7.35   POCT PCO2, VENOUS mm Hg 35* 36*   POCT PO2, VENOUS mm Hg 54* 51*        Micro/ID:     Lab Results   Component Value Date    BLOODCULT No growth at 1 day 05/17/2025    BLOODCULT No growth at 1 day 05/17/2025       Summary of key imaging results from the last 24 hours    CXR 5/17  IMPRESSION:  1. Right more than left bibasilar opacity which may be due to  atelectasis or infectious infiltrate.  2. Small right pleural effusion.  3. Recommend follow-up radiograph.  4. Medical devices as above.    Assessment and Plan     Assessment:   Ramana Looney is a 84 y.o. male with known diabetes, hyperlipidemia and hypertension who is a transfer from [facility] who initially presented with abdominal pain and diarrhea. His course is complicated by MARY with hyperkalemia needing emergency renal replacement therapy (did 4 times per intake note) and there was also noted multiple nodules with varying size concerning for liver cirrhosis vs hepatocellular CA needing biopsy. His hepatitis panel was negative and stool pathogen PCR was negative as well. He was transfer at OSH in the ICU due worsening renal and liver function and acidosis. He had episodes of hypotension requiring pressors. Per OSH notes, there is a concern for line infection that is why his antibiotics was broaden to  Meropenem. Sepsis work up was started as well at OSH.  Patient was then transferred to our institution for higher level of care for his undifferentiated shock.     Mechanical Ventilation: none  Sedation/Analgesia:  none  Restraints: no    Summary for 05/19/25:  Follow up hepatology recs  Sent AFP, ceruloplasmin, AMA, SHIV, ASMA  Follow up urine and blood cultures (NGTD)  Trend LFT's, coags  Follow up TTE today  Start heparin subcutaneous DVT ppx  Trend mental status, PRN lactate  C/w vanc and meropenem    Plan:  Neuro:  # Metabolic encephalopathy, improving  - most likely from possible liver cirrhosis vs uremia  - C/w Lactulose 20g q8h  - Other workup below     Cardiovascular:  # Undifferentiated Shock  :: etiology - septic (source: UTI vs PNA vs ?SBP) vs cardiogenic (tropinemia, arrhythmia)  :: last stress echo 9/2024 Normal global left ventricular systolic function.  :: lactate 4.8 on admission, peaked 14 at OSH, 2.6 5/18  :: Started on pressors (levophed) 05/18   - MAP goal > 60, wean levophed as tolerated  - Follow up TTE   - Follow up blood cultures 5/17 (NGTD), procalc, sputum cx  - Follow up cultures at OSH ([facility])  - C/w vanc meropenem, renally dose    #Troponinemia  #Type II MI  # Atypical afLutter  :: there was report that patient was started on Cardizem gtt due to runs of tachyarrhthmia at OSH.  :: There was no noted recurrence while on gtt but the gtt was stopped due to c/f undifferentiaed shock  :: Tropin peaked at 1.67, no longer trending  ::Repeated transient episodes of atypicla flutter on telemetry, patient asymptomatic  - Cardiology consulted, appreciate recs  - Follow up TTE today  - Hold ASA, AC given recent anemia requiring transfusion and thrombocytopenia  - Will need coronary angiography in the future to evaluate for CAD  - Hold trialling low-dose BB (metop) for tachyarrhythmia given patient asymptomatic, rates controled  - C/w telemetry monitoring     # HTN  - hold Amlodipine 5 mg once a  day, atenolol 25 once a day and valsartan 80 mg once a day in the setting of shock     # DLD  - HOLD simvastatin 40 mg tPO once a day and Gemfibrozil 600 mg BID in the setting of worsening LFTs        Respiratory:  # Acute hypoxic respiratory failure  # C/f PNA  :: Having increased O2 requirements, currently on HFNC 12 L  :: Likely due to PNA vs vol overload  - CXR showed atelectasis vs infiltrate  - Vanc chandrakant as above  - follow up blood culture here, follow up MRSA nares, procalc, sputum cx  - follow up cultures at OSH ([facility])  - can repeat CXR to monitor progression/resolution       GI:  # Multiple liver nodules c/f Cirrhosis to r/o HCC  # Transaminitis  :: Only known risk factor as of now is occasional alcohol use  :: Multiple nodules  0.5-3.5 cm mass lesions on MRI liver with noted portal HTN on RUQ with Doppler. No obstructive disease on MRCP.  :: Per OSH chart, hepatitis panel was negative. Ferritin 108.  :: EGD at OSH 5/12 showing no varices or active bleed, evidence of PHG, normal esophagus and duodenum. Bx taken, showed no H pylori, chronic mild gastritis.  - Will complete the liver stamp work up. SHIV, AMA, ASMA, Ceruloplasmin, AAT and Ferritin ordered  - Once more stable he may need liver biopsy to r/o HCC  - Consulted Hepatology, follow up recs  - Trend LFT's, coags  - Lactulose as above        Renal/:  # MARY, oliguric  # Concern for HRS  ::Not a candidate for terlipressin  ::Baseline creatinine per OSH 1.7  ::Started emergency RRT and had 4 sessions at OSH  ::Likely etiology is pre-renal vs ATN iso recent shock, BP changes, diarrhea, ?HRS  ::FENa 0.9% --> prerenal  - Consulted Nephrology appreciate recs  - RRT per nephrology  - Avoid nephrotoxins  - Strict I/O  - Renally dose meds       # Possible UTI  ::Started on Ceftriaxone at OSH for UTI but broaden to Meropenem iso of shock  - Antibiotics as above     # BPH  - c/w tamsulosin 0.4 mg qd      Heme/Onc:  # Anemia  ::baseline 10-11  ::05/18 Hgb to  6.4, s/p 1u RBC   ::no obvious sites of active bleeding, EGD 5/12 at OSH showed no active bleeding  ::T and S ordered; consent for blood secured on admission  ::TEG, fibrinogen ordered  - Hgb stable since transfusion  - CTM     Endo:  # DM type 2  - Holding Metformin 1g once a day and glyburide 5 mg BID  - SSI inpatient     ID:  - see above    ICU Check List       FEN  Fluids: PRN  Electrolytes: PRN  Nutrition: regular diet  Prophylaxis:  DVT ppx: hold iso anemia  GI ppx: PPI  Bowel care: vanc-chandrakant  Hardware:         CVC 05/17/25 Triple lumen Tunneled Left Internal jugular (Active)   Placement Date/Time: 05/17/25 1500   Lumen Type: Triple lumen  CVC Type: Tunneled  Orientation: Left  Location: Internal jugular   Number of days: 1       Hemodialysis Cath 05/15/25 Double lumen Right Subclavian (Active)   Placement Date/Time: 05/15/25 1400   Hemodialysis Catheter Type: Double lumen  Orientation: Right  Access Location: Subclavian   Number of days: 3       Social:  Code: DNR and No Intubation    HPOA: Shoshana Looney (spouse) [phone]   [facility]             Basim Dc MD   05/19/25 at 6:52 AM     Disclaimer: Documentation completed with the information available at the time of input. The times in the chart may not be reflective of actual patient care times, interventions, or procedures. Documentation occurs after the physical care of the patient.        [1] [Held by provider] heparin (porcine), 5,000 Units, subcutaneous, q8h  insulin lispro, 0-5 Units, subcutaneous, q4h  lactulose, 20 g, oral, q8h  meropenem, 500 mg, intravenous, Every Day  tamsulosin, 0.4 mg, oral, Daily     [2] norepinephrine, 0-1 mcg/kg/min, Last Rate: 0.08 mcg/kg/min (05/19/25 0251)     [3] PRN medications: albuterol, oxygen, vancomycin

## 2025-05-19 NOTE — PROGRESS NOTES
"[facility]  [facility] [place]  CONSULT FOLLOW-UP     Reason For Consult  concerns of new cirrhosis, multiple hepatic lesions on MRI concerning for possible HCC, and question of HRS as etiology for HRS    History Of Present Illness  Ramana Looney is a 84 y.o. male with PMH HTN, HLD, DM II, BPH who originally presented to [facility] on 5/5 for one week of abdominal pain and diarrhea, ultimately found to have complicated UTI. Course further complicated by shock (multifactorial due to sepsis, hypovolemia, vasoplegia) c/b ischemic hepatitis and oliguric MARY thought 2/2 HTN requiring emergent RRT x4 at OSH, atrial flutter requiring beta-blockade, and acute hypoxemic respiratory failure requiring HFNC (requiring 10L HFNC). He was transferred to [facility] MICU on 5/17/2025 for higher level of care. Hepatology is consulted for concerns of new cirrhosis, multiple hepatic lesions on MRI concerning for possible HCC, and question of HRS as etiology for HRS      SUBJECTIVE     NAEO   On 12L HFNC today  Remains on 0.1-0.12 levophed  Reports feeling weak.    EXAM     Last Recorded Vitals  Blood pressure (!) 99/45, pulse 74, temperature 36.2 °C (97.2 °F), temperature source Temporal, resp. rate 15, height 1.753 m (5' 9\"), weight 73 kg (160 lb 15 oz), SpO2 96%.      Intake/Output Summary (Last 24 hours) at 5/19/2025 1804  Last data filed at 5/19/2025 1700  Gross per 24 hour   Intake 682.92 ml   Output 350 ml   Net 332.92 ml          Physical Exam  General: thin/cachectic,  HEENT: PERRLA, EOM intact, no scleral icterus, dry MM, wearing HFNC, SOB with talking  Respiratory: SOB with talking  Cardiovascular: RRR, no murmurs/rubs/gallops  Abdomen: Soft, nontender, nondistended, bowel sounds present, no masses palpated, no organomeagly  Extremities: no edema, no asterixis  Neuro: alert and oriented, CNII-XII grossly intact,, generalized weakness      OBJECTIVE                                              "                                 Medications           Current Medications[1]                                                                            Labs     Results for orders placed or performed during the hospital encounter of 05/17/25 (from the past 24 hours)   Blood Gas Venous Full Panel   Result Value Ref Range    POCT pH, Venous 7.33 7.33 - 7.43 pH    POCT pCO2, Venous 35 (L) 41 - 51 mm Hg    POCT pO2, Venous 54 (H) 35 - 45 mm Hg    POCT SO2, Venous 80 (H) 45 - 75 %    POCT Oxy Hemoglobin, Venous 78.0 (H) 45.0 - 75.0 %    POCT Hematocrit Calculated, Venous 25.0 (L) 41.0 - 52.0 %    POCT Sodium, Venous 135 (L) 136 - 145 mmol/L    POCT Potassium, Venous 4.4 3.5 - 5.3 mmol/L    POCT Chloride, Venous 103 98 - 107 mmol/L    POCT Ionized Calicum, Venous 1.12 1.10 - 1.33 mmol/L    POCT Glucose, Venous 150 (H) 74 - 99 mg/dL    POCT Lactate, Venous 2.6 (H) 0.4 - 2.0 mmol/L    POCT Base Excess, Venous -6.8 (L) -2.0 - 3.0 mmol/L    POCT HCO3 Calculated, Venous 18.5 (L) 22.0 - 26.0 mmol/L    POCT Hemoglobin, Venous 8.4 (L) 13.5 - 17.5 g/dL    POCT Anion Gap, Venous 18.0 10.0 - 25.0 mmol/L    Patient Temperature 37.0 degrees Celsius    FiO2 60 %   Vancomycin   Result Value Ref Range    Vancomycin 14.8 5.0 - 20.0 ug/mL   POCT GLUCOSE   Result Value Ref Range    POCT Glucose 146 (H) 74 - 99 mg/dL   Urine electrolytes   Result Value Ref Range    Sodium, Urine Random 25 mmol/L    Sodium/Creatinine Ratio 14 Not established. mmol/g Creat    Potassium, Urine Random 50 mmol/L    Potassium/Creatinine Ratio 29 Not established mmol/g Creat    Chloride, Urine Random 26 mmol/L    Chloride/Creatinine Ratio 15 (L) 23 - 275 mmol/g creat    Creatinine, Urine Random 175.2 20.0 - 370.0 mg/dL   Albumin-Creatinine Ratio, Urine Random   Result Value Ref Range    Albumin, Urine Random 536.3 Not established mg/L    Creatinine, Urine Random 175.2 20.0 - 370.0 mg/dL    Albumin/Creatinine Ratio 306.1 (H) <30.0 ug/mg Creat   Urinalysis with  Reflex Culture and Microscopic   Result Value Ref Range    Color, Urine Dark-Yellow Light-Yellow, Yellow, Dark-Yellow    Appearance, Urine Turbid (N) Clear    Specific Gravity, Urine 1.037 (N) 1.005 - 1.035    pH, Urine 5.5 5.0, 5.5, 6.0, 6.5, 7.0, 7.5, 8.0    Protein, Urine 100 (2+) (A) NEGATIVE, 10 (TRACE), 20 (TRACE) mg/dL    Glucose, Urine 70 (1+) (A) Normal mg/dL    Blood, Urine 0.06 (1+) (A) NEGATIVE mg/dL    Ketones, Urine TRACE (A) NEGATIVE mg/dL    Bilirubin, Urine 1 (1+) (A) NEGATIVE mg/dL    Urobilinogen, Urine 2 (1+) (A) Normal mg/dL    Nitrite, Urine NEGATIVE NEGATIVE    Leukocyte Esterase, Urine 25 João/uL (A) NEGATIVE   Extra Urine Gray Tube   Result Value Ref Range    Extra Tube 293    Microscopic Only, Urine   Result Value Ref Range    WBC, Urine 1-5 1-5, NONE /HPF    RBC, Urine >20 (A) NONE, 1-2, 3-5 /HPF    Squamous Epithelial Cells, Urine 1-9 (SPARSE) Reference range not established. /HPF    Mucus, Urine 1+ Reference range not established. /LPF    Calcium Oxalate Crystals, Urine 2+ (A) NONE, 1+ /HPF   Hepatic function panel   Result Value Ref Range    Albumin 2.9 (L) 3.4 - 5.0 g/dL    Bilirubin, Total 12.4 (H) 0.0 - 1.2 mg/dL    Bilirubin, Direct 7.5 (H) 0.0 - 0.3 mg/dL    Alkaline Phosphatase 331 (H) 33 - 136 U/L     (H) 10 - 52 U/L     (H) 9 - 39 U/L    Total Protein 4.1 (L) 6.4 - 8.2 g/dL   CBC and Auto Differential   Result Value Ref Range    WBC 8.5 4.4 - 11.3 x10*3/uL    nRBC 1.3 (H) 0.0 - 0.0 /100 WBCs    RBC 2.80 (L) 4.50 - 5.90 x10*6/uL    Hemoglobin 8.0 (L) 13.5 - 17.5 g/dL    Hematocrit 23.4 (L) 41.0 - 52.0 %    MCV 84 80 - 100 fL    MCH 28.6 26.0 - 34.0 pg    MCHC 34.2 32.0 - 36.0 g/dL    RDW 24.3 (H) 11.5 - 14.5 %    Platelets 83 (L) 150 - 450 x10*3/uL    Neutrophils % 82.0 40.0 - 80.0 %    Immature Granulocytes %, Automated 1.4 (H) 0.0 - 0.9 %    Lymphocytes % 8.1 13.0 - 44.0 %    Monocytes % 8.4 2.0 - 10.0 %    Eosinophils % 0.0 0.0 - 6.0 %    Basophils % 0.1 0.0 - 2.0  %    Neutrophils Absolute 6.97 (H) 1.60 - 5.50 x10*3/uL    Immature Granulocytes Absolute, Automated 0.12 0.00 - 0.50 x10*3/uL    Lymphocytes Absolute 0.69 (L) 0.80 - 3.00 x10*3/uL    Monocytes Absolute 0.71 0.05 - 0.80 x10*3/uL    Eosinophils Absolute 0.00 0.00 - 0.40 x10*3/uL    Basophils Absolute 0.01 0.00 - 0.10 x10*3/uL   Magnesium   Result Value Ref Range    Magnesium 3.10 (H) 1.60 - 2.40 mg/dL   Coagulation Screen   Result Value Ref Range    Protime 15.4 (H) 9.8 - 12.4 seconds    INR 1.4 (H) 0.9 - 1.1    aPTT 29 26 - 36 seconds   Phosphorus   Result Value Ref Range    Phosphorus 5.7 (H) 2.5 - 4.9 mg/dL   Basic Metabolic Panel   Result Value Ref Range    Glucose 163 (H) 74 - 99 mg/dL    Sodium 136 136 - 145 mmol/L    Potassium 4.4 3.5 - 5.3 mmol/L    Chloride 98 98 - 107 mmol/L    Bicarbonate 22 21 - 32 mmol/L    Anion Gap 20 10 - 20 mmol/L    Urea Nitrogen 80 (H) 6 - 23 mg/dL    Creatinine 8.31 (H) 0.50 - 1.30 mg/dL    eGFR 6 (L) >60 mL/min/1.73m*2    Calcium 8.2 (L) 8.6 - 10.6 mg/dL   Alpha-Fetoprotein   Result Value Ref Range    Alpha-Fetoprotein <4 0 - 9 ng/mL   POCT GLUCOSE   Result Value Ref Range    POCT Glucose 163 (H) 74 - 99 mg/dL   POCT GLUCOSE   Result Value Ref Range    POCT Glucose 203 (H) 74 - 99 mg/dL   Transthoracic Echo Complete   Result Value Ref Range    BSA 1.89 m2   POCT GLUCOSE   Result Value Ref Range    POCT Glucose 177 (H) 74 - 99 mg/dL   Blood Gas Venous Full Panel   Result Value Ref Range    POCT pH, Venous 7.31 (L) 7.33 - 7.43 pH    POCT pCO2, Venous 35 (L) 41 - 51 mm Hg    POCT pO2, Venous 52 (H) 35 - 45 mm Hg    POCT SO2, Venous 80 (H) 45 - 75 %    POCT Oxy Hemoglobin, Venous 77.8 (H) 45.0 - 75.0 %    POCT Hematocrit Calculated, Venous 26.0 (L) 41.0 - 52.0 %    POCT Sodium, Venous 134 (L) 136 - 145 mmol/L    POCT Potassium, Venous 4.4 3.5 - 5.3 mmol/L    POCT Chloride, Venous 102 98 - 107 mmol/L    POCT Ionized Calicum, Venous 1.13 1.10 - 1.33 mmol/L    POCT Glucose, Venous 182  (H) 74 - 99 mg/dL    POCT Lactate, Venous 2.7 (H) 0.4 - 2.0 mmol/L    POCT Base Excess, Venous -7.9 (L) -2.0 - 3.0 mmol/L    POCT HCO3 Calculated, Venous 17.6 (L) 22.0 - 26.0 mmol/L    POCT Hemoglobin, Venous 8.7 (L) 13.5 - 17.5 g/dL    POCT Anion Gap, Venous 19.0 10.0 - 25.0 mmol/L    Patient Temperature 37.0 degrees Celsius    FiO2 60 %                                                                              Imaging:     MRI shows multiple, diffuse hepatic lesions varying 0.5 to 3.5 cm in size and focal, nodular regenerative hyperplasia.                                                                          GI Procedures     EGD conducted 5/12/25 showed normal esophagus, PHG, gastritis (negative h pylori), and unremarkable duodenum.       ASSESSMENT / PLAN     ASSESSMENT/PLAN:  Ramana Looney is a 84 y.o. male with PMH HTN, HLD, DM II, BPH who originally presented to [facility] on 5/5 for one week of abdominal pain and diarrhea, ultimately found to have complicated UTI. Course further complicated by shock (multifactorial due to sepsis, hypovolemia, vasoplegia) c/b ischemic hepatitis and oliguric MARY thought 2/2 HTN requiring emergent RRT x4 at OSH, atrial flutter requiring beta-blockade, and acute hypoxemic respiratory failure requiring HFNC (requiring 10L HFNC). He was transferred to [facility] MICU on 5/17/2025 for higher level of care. 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 He has signs/symptoms of CSPH such as slightly elevated INR, thrombocytopenia and PHG seen on recent 5/12 EGD, but he does not appear overtly decompensated at this time. The hepatic masses could be due to HCC, though without a known diagnosis of cirrhosis, this would require a biopsy to confirm. His  deteriorating renal function is likely due to ATN from hypotension from a combination of sepsis, cardiac arrhthymias, and fluid shifting from dialysis. This is supported by significant lactic acidosis and moderate hepatocellular liver injury (improving) with hyperbilirubinemia due to ischemic hepatitis (ALT:LDH < 1.5). Low concern for HRS at this time (5/18 urine sodium ws 25). The patient's altered mentation is more likely related to hyperactive delirium rather than hepatic encephalopathy at this time.    #Hepatic Masses  #Moderate, Hepatocellular Liver Injury - improving  #Hyperbilirubinemia - stable     Recommendations:  -Consider checking CK (ordered to be drawn with AM labs)  -Obtain US liver doppler  -Please have OSH MRI uploaded to PACS  -Noted AFP this admission is <4  -Low suspicion for HRS at this time, and even if so,  patient is not a terlipressin candidate at this time due to AHRF as well as ACLF (3).  -Please check acute hepatitis panel plus HCV PCR, HBV core total Ab, HBV core IgM and HAV IgG  -Liver stamp 5/17: SHIV, AMA, ASMA, ceruloplasmin all unrevealing  -Ongoing supportive care per primary team  -Continue to trend MELD labs daily: CMP, INR     Patient was seen and discussed with Dr. Newton    Thank you for the consultation. Hepatology will continue to the follow.  - During weekday hours of 7am- 5pm, please do not hesitate to contact me on This Week In Chat or page [phone] if there are any further questions   - After hours, on weekends, and on holidays, please page [place] [phone]. Thank you.     Olena Michele MD  PGY5 Gastroenterology Fellow  [facility]         [1]   Current Facility-Administered Medications:     albuterol 2.5 mg /3 mL (0.083 %) nebulizer solution 2.5 mg, 2.5 mg, nebulization, q4h PRN, Pawel Salas MD    heparin (porcine) injection 5,000 Units, 5,000 Units, subcutaneous, q8h, Mich Rey MD, 5,000 Units at 05/19/25 1256    insulin lispro injection 0-5  Units, 0-5 Units, subcutaneous, q4h, Pawel Salas MD, 1 Units at 05/19/25 1616    lactated Ringer's bolus 500 mL, 500 mL, intravenous, Once, Basim Dc MD, Last Rate: 250 mL/hr at 05/19/25 1709, 500 mL at 05/19/25 1709    lactulose 20 gram/30 mL oral solution 20 g, 20 g, oral, q8h, Pawel Salas MD, 20 g at 05/19/25 1444    meropenem (Merrem) 500 mg in sodium chloride 0.9%  mL, 500 mg, intravenous, Every Day, Pawel Salas MD, Stopped at 05/19/25 0251    norepinephrine (Levophed) 8 mg in dextrose 5% 250 mL (0.032 mg/mL) infusion (premix), 0-1 mcg/kg/min, intravenous, Continuous, Pawel Salas MD, Last Rate: 16.43 mL/hr at 05/19/25 1245, 0.12 mcg/kg/min at 05/19/25 1245    oxygen (O2) therapy, , inhalation, Continuous PRN - O2/gases, Sen Jacobo MD, 12 L/min at 05/18/25 0427    tamsulosin (Flomax) 24 hr capsule 0.4 mg, 0.4 mg, oral, Daily, Pawel Salas MD, 0.4 mg at 05/19/25 0802    vancomycin (Vancocin) pharmacy to dose - pharmacy monitoring, , miscellaneous, Daily PRN, Pawel Salas MD

## 2025-05-19 NOTE — PROCEDURES
Reporting  Critical Care Fellow's Point of Care Echo      Date and Time of the study: 5/19/2025 12:02 PM  Indication: Shock and Volume status assessment  Patient is spontaneously breathing  Images quality: Limited by body habitus or patient position  Views acquired: PSAX, PLAX, and IVC    Echo Findings    Pericardial effusion    Size: No effusion identified  Signs of tamponade: Absent    Left ventricle:   Cavity size: Normal  LVH: Unable to assess  LV function: Normal  Regional Wall Motion abnormalities: Couldn't be assessed      Left atrium:   Size: Couldn't be evaluated    Valves:  Wasn't assessed    Right ventricle:  Size: Couldn't be evaluated  Function: Normal  Signs of overload: Couldn't be assessed      IVC:  Diameter: 18 mm  IVC collapsibility/dispensability index: More than 50%        Impression:  Preserved Biventricular function  IVC Collapsible      This study and impression was done by a Critical Care fellow. The report is preliminary unless verified by an attending or a radiologist.

## 2025-05-19 NOTE — PROGRESS NOTES
"Ramana Looney is a 84 y.o. male on day 2 of admission presenting with Shock (Multi).    Subjective   NAEO. Patient now in NSR  On 0.1 of levo       Objective     Physical Exam  Gen: frail appearing  Neuro: AAOx3, CN 2-12 intact, no FND  HEENT: PEERL, EOMI, sclera anicteric, no conjunctival injection, MMM, no oropharyngeal lesions  Neck: no elevated JVD  Resp: CTAB, normal breath sounds, no wheezing/crackles/ rales  CV: RRR, normal S1/S2, no murmurs/ rubs/gallops  GI: non-tender, non-distended, normal BSs in all 4 quadrants  MSK: warm and well perfused, no edema  Skin: warm and dry, no lesions, no rashes     Last Recorded Vitals  Blood pressure 110/61, pulse 96, temperature 36.4 °C (97.5 °F), temperature source Temporal, resp. rate 17, height 1.753 m (5' 9\"), weight 73 kg (160 lb 15 oz), SpO2 95%.  Intake/Output last 3 Shifts:  I/O last 3 completed shifts:  In: 1887.1 (25.9 mL/kg) [I.V.:66.3 (0.9 mL/kg); Blood:350; IV Piggyback:1470.8]  Out: 350 (4.8 mL/kg) [Stool:350]  Weight: 73 kg     Relevant Results  LABS:  CMP:  Results from last 7 days   Lab Units 05/19/25  0459 05/18/25  0543 05/17/25  2216   SODIUM mmol/L 136 140 139   POTASSIUM mmol/L 4.4 3.8 3.7   CHLORIDE mmol/L 98 101 98   CO2 mmol/L 22 21 21   ANION GAP mmol/L 20 22* 24*   BUN mg/dL 80* 69* 67*   CREATININE mg/dL 8.31* 6.60* 6.33*   EGFR mL/min/1.73m*2 6* 8* 8*   MAGNESIUM mg/dL 3.10* 2.96* 3.03*   ALBUMIN g/dL 2.9* 3.0* 3.3*   ALT U/L 371* 453* 493*   AST U/L 438* 448* 498*   BILIRUBIN TOTAL mg/dL 12.4* 9.9* 9.9*     CBC:  Results from last 7 days   Lab Units 05/19/25  0459 05/18/25  1059 05/18/25  0543 05/17/25  2216   WBC AUTO x10*3/uL 8.5 8.0 8.4 8.0   HEMOGLOBIN g/dL 8.0* 7.5* 6.4* 6.9*   HEMATOCRIT % 23.4* 23.9* 19.9* 22.2*   PLATELETS AUTO x10*3/uL 83* 85* 88* 95*   MCV fL 84 89 88 93     COAG:   Results from last 7 days   Lab Units 05/19/25  0459 05/18/25  1059 05/18/25  0543 05/17/25  2216   INR  1.4* 1.4* 1.5* 1.4*     ABO:   ABO TYPE   Date " Value Ref Range Status   05/18/2025 A  Final     HEME/ENDO:  Results from last 7 days   Lab Units 05/17/25  2228   FERRITIN ng/mL 155   IRON SATURATION % 11*   HEMOGLOBIN A1C % 5.4      CARDIAC:   Results from last 7 days   Lab Units 05/18/25  1632 05/18/25  1059 05/18/25  0704 05/18/25  0543 05/18/25  0124 05/17/25  2216   [facility] ng/L 1,459* 1,671* 1,665* 1,670* 1,391* 1,337*     Recent Labs     05/17/25  2228   CHOL 246*   LDLCALC 201*   HDL 5.6   TRIG 196*       Assessment & Plan  Shock (Multi)    Ramana Looney is a 84 y.o. male with known diabetes, hyperlipidemia and hypertension. Cardiology consulted for troponin elevation and WCT.      Patient is a transfer from [facility] initially presented with abdominal pain and diarrhea. Clinical course complicated by MARY with hyperkalemia needing emergency RRT and there was also noted multiple nodules with varying size concerning for liver cirrhosis vs hepatocellular CA. He was transferred to the ICU at OSH due to worsening renal/liver function and acidosis and there was a concern for sepsis due to line infection. He is being treated with broad spectrum antibiotics. He was transferred to [facility] for higher level of care for his undifferentiated shock.      Pt remains asymptomatic from cardiac perspective.   ECG reviewed and is consistent with 2:1 atypical flutter. Pt Trop 1300>1600.   TTE on 5/19 with preserved, hyperdynamic EF      #Troponin elevation  -Likely type II demand iso severe illness (severe anemia, severe MARY and liver failure, possibly sepsis).   -continue troponin trend till peak  -EF is hyperdynamic on TTE - patient will benefit from fluid resuscitation     #Atypical aflutter  -Intermittent, asymptomatic.  -Recommend anticoagulation once stable/deemed safe by primary team. (CHADs-VASc 3)  -If he has recurrent episode can trial low dose metoprolol tartrate 12.5.mg if BP allows, otherwise digoxin can be used.   -Will need EP follow up upon  discharge.      I spent 45 minutes in the professional and overall care of this patient.      Lacey Hassan MD

## 2025-05-20 LAB
ALBUMIN SERPL BCP-MCNC: 2.9 G/DL (ref 3.4–5)
ALP SERPL-CCNC: 357 U/L (ref 33–136)
ALT SERPL W P-5'-P-CCNC: 275 U/L (ref 10–52)
ANA SER QL HEP2 SUBST: NEGATIVE
ANION GAP BLDV CALCULATED.4IONS-SCNC: 22 MMOL/L (ref 10–25)
ANION GAP SERPL CALC-SCNC: 22 MMOL/L (ref 10–20)
AORTIC VALVE MEAN GRADIENT: 9 MMHG
AORTIC VALVE PEAK VELOCITY: 2.25 M/S
APTT PPP: 29 SECONDS (ref 26–36)
AST SERPL W P-5'-P-CCNC: 338 U/L (ref 9–39)
AV PEAK GRADIENT: 20 MMHG
AVA (PEAK VEL): 2.16 CM2
AVA (VTI): 2.28 CM2
BACTERIA UR CULT: NO GROWTH
BASE EXCESS BLDV CALC-SCNC: -10.6 MMOL/L (ref -2–3)
BASOPHILS # BLD AUTO: 0 X10*3/UL (ref 0–0.1)
BASOPHILS NFR BLD AUTO: 0 %
BILIRUB DIRECT SERPL-MCNC: 8.6 MG/DL (ref 0–0.3)
BILIRUB SERPL-MCNC: 14.3 MG/DL (ref 0–1.2)
BODY SURFACE AREA: 1.89 M2
BODY TEMPERATURE: 37 DEGREES CELSIUS
BUN SERPL-MCNC: 96 MG/DL (ref 6–23)
CA-I BLDV-SCNC: 1.09 MMOL/L (ref 1.1–1.33)
CALCIUM SERPL-MCNC: 8.4 MG/DL (ref 8.6–10.6)
CERULOPLASMIN SERPL-MCNC: 48.8 MG/DL (ref 20–60)
CHLORIDE BLDV-SCNC: 101 MMOL/L (ref 98–107)
CHLORIDE SERPL-SCNC: 99 MMOL/L (ref 98–107)
CK SERPL-CCNC: 224 U/L (ref 0–325)
CO2 SERPL-SCNC: 21 MMOL/L (ref 21–32)
CREAT SERPL-MCNC: 9.15 MG/DL (ref 0.5–1.3)
EGFRCR SERPLBLD CKD-EPI 2021: 5 ML/MIN/1.73M*2
EJECTION FRACTION APICAL 4 CHAMBER: 76.6
EJECTION FRACTION: 73 %
EOSINOPHIL # BLD AUTO: 0 X10*3/UL (ref 0–0.4)
EOSINOPHIL NFR BLD AUTO: 0 %
ERYTHROCYTE [DISTWIDTH] IN BLOOD BY AUTOMATED COUNT: 24.3 % (ref 11.5–14.5)
GLUCOSE BLD MANUAL STRIP-MCNC: 149 MG/DL (ref 74–99)
GLUCOSE BLD MANUAL STRIP-MCNC: 163 MG/DL (ref 74–99)
GLUCOSE BLD MANUAL STRIP-MCNC: 216 MG/DL (ref 74–99)
GLUCOSE BLDV-MCNC: 187 MG/DL (ref 74–99)
GLUCOSE SERPL-MCNC: 152 MG/DL (ref 74–99)
HCO3 BLDV-SCNC: 14.9 MMOL/L (ref 22–26)
HCT VFR BLD AUTO: 23.5 % (ref 41–52)
HCT VFR BLD EST: 27 % (ref 41–52)
HCV RNA SERPL NAA+PROBE-ACNC: NOT DETECTED K[IU]/ML
HCV RNA SERPL NAA+PROBE-LOG IU: NORMAL {LOG_IU}/ML
HGB BLD-MCNC: 8.1 G/DL (ref 13.5–17.5)
HGB BLDV-MCNC: 8.9 G/DL (ref 13.5–17.5)
IMM GRANULOCYTES # BLD AUTO: 0.11 X10*3/UL (ref 0–0.5)
IMM GRANULOCYTES NFR BLD AUTO: 1.4 % (ref 0–0.9)
INHALED O2 CONCENTRATION: 60 %
INR PPP: 1.5 (ref 0.9–1.1)
LACTATE BLDV-SCNC: 3.7 MMOL/L (ref 0.4–2)
LEFT ATRIUM VOLUME AREA LENGTH INDEX BSA: 39.2 ML/M2
LEFT VENTRICLE INTERNAL DIMENSION DIASTOLE: 4.96 CM (ref 3.5–6)
LEFT VENTRICULAR OUTFLOW TRACT DIAMETER: 2.02 CM
LYMPHOCYTES # BLD AUTO: 0.47 X10*3/UL (ref 0.8–3)
LYMPHOCYTES NFR BLD AUTO: 6 %
MAGNESIUM SERPL-MCNC: 3.06 MG/DL (ref 1.6–2.4)
MCH RBC QN AUTO: 28.7 PG (ref 26–34)
MCHC RBC AUTO-ENTMCNC: 34.5 G/DL (ref 32–36)
MCV RBC AUTO: 83 FL (ref 80–100)
MITOCHONDRIA AB SER QL IF: NEGATIVE
MITRAL VALVE E/A RATIO: 1.18
MONOCYTES # BLD AUTO: 0.63 X10*3/UL (ref 0.05–0.8)
MONOCYTES NFR BLD AUTO: 8.1 %
NEUTROPHILS # BLD AUTO: 6.56 X10*3/UL (ref 1.6–5.5)
NEUTROPHILS NFR BLD AUTO: 84.5 %
NRBC BLD-RTO: 1.2 /100 WBCS (ref 0–0)
OXYHGB MFR BLDV: 77.1 % (ref 45–75)
PCO2 BLDV: 31 MM HG (ref 41–51)
PH BLDV: 7.29 PH (ref 7.33–7.43)
PHOSPHATE SERPL-MCNC: 5.5 MG/DL (ref 2.5–4.9)
PLATELET # BLD AUTO: 70 X10*3/UL (ref 150–450)
PO2 BLDV: 54 MM HG (ref 35–45)
POTASSIUM BLDV-SCNC: 4.8 MMOL/L (ref 3.5–5.3)
POTASSIUM SERPL-SCNC: 4.3 MMOL/L (ref 3.5–5.3)
PROT SERPL-MCNC: 4.3 G/DL (ref 6.4–8.2)
PROTHROMBIN TIME: 16.3 SECONDS (ref 9.8–12.4)
RBC # BLD AUTO: 2.82 X10*6/UL (ref 4.5–5.9)
RIGHT VENTRICLE FREE WALL PEAK S': 22 CM/S
RIGHT VENTRICLE PEAK SYSTOLIC PRESSURE: 31.6 MMHG
SAO2 % BLDV: 79 % (ref 45–75)
SMOOTH MUSCLE AB SER QL IF: NEGATIVE
SODIUM BLDV-SCNC: 133 MMOL/L (ref 136–145)
SODIUM SERPL-SCNC: 138 MMOL/L (ref 136–145)
TRICUSPID ANNULAR PLANE SYSTOLIC EXCURSION: 1.9 CM
WBC # BLD AUTO: 7.8 X10*3/UL (ref 4.4–11.3)

## 2025-05-20 PROCEDURE — 37799 UNLISTED PX VASCULAR SURGERY: CPT

## 2025-05-20 PROCEDURE — 85025 COMPLETE CBC W/AUTO DIFF WBC: CPT

## 2025-05-20 PROCEDURE — 2500000002 HC RX 250 W HCPCS SELF ADMINISTERED DRUGS (ALT 637 FOR MEDICARE OP, ALT 636 FOR OP/ED)

## 2025-05-20 PROCEDURE — 2500000004 HC RX 250 GENERAL PHARMACY W/ HCPCS (ALT 636 FOR OP/ED)

## 2025-05-20 PROCEDURE — 2500000005 HC RX 250 GENERAL PHARMACY W/O HCPCS: Performed by: INTERNAL MEDICINE

## 2025-05-20 PROCEDURE — 84295 ASSAY OF SERUM SODIUM: CPT

## 2025-05-20 PROCEDURE — 84132 ASSAY OF SERUM POTASSIUM: CPT

## 2025-05-20 PROCEDURE — 82550 ASSAY OF CK (CPK): CPT | Performed by: STUDENT IN AN ORGANIZED HEALTH CARE EDUCATION/TRAINING PROGRAM

## 2025-05-20 PROCEDURE — 82947 ASSAY GLUCOSE BLOOD QUANT: CPT

## 2025-05-20 PROCEDURE — 84075 ASSAY ALKALINE PHOSPHATASE: CPT

## 2025-05-20 PROCEDURE — 87522 HEPATITIS C REVRS TRNSCRPJ: CPT

## 2025-05-20 PROCEDURE — 80048 BASIC METABOLIC PNL TOTAL CA: CPT

## 2025-05-20 PROCEDURE — 84100 ASSAY OF PHOSPHORUS: CPT

## 2025-05-20 PROCEDURE — 2500000004 HC RX 250 GENERAL PHARMACY W/ HCPCS (ALT 636 FOR OP/ED): Mod: JZ

## 2025-05-20 PROCEDURE — 99232 SBSQ HOSP IP/OBS MODERATE 35: CPT | Performed by: INTERNAL MEDICINE

## 2025-05-20 PROCEDURE — 5A1D90Z PERFORMANCE OF URINARY FILTRATION, CONTINUOUS, GREATER THAN 18 HOURS PER DAY: ICD-10-PCS

## 2025-05-20 PROCEDURE — 99291 CRITICAL CARE FIRST HOUR: CPT

## 2025-05-20 PROCEDURE — 2500000005 HC RX 250 GENERAL PHARMACY W/O HCPCS

## 2025-05-20 PROCEDURE — 71045 X-RAY EXAM CHEST 1 VIEW: CPT | Performed by: RADIOLOGY

## 2025-05-20 PROCEDURE — 85610 PROTHROMBIN TIME: CPT

## 2025-05-20 PROCEDURE — 2020000001 HC ICU ROOM DAILY

## 2025-05-20 PROCEDURE — 83735 ASSAY OF MAGNESIUM: CPT

## 2025-05-20 PROCEDURE — 2500000001 HC RX 250 WO HCPCS SELF ADMINISTERED DRUGS (ALT 637 FOR MEDICARE OP)

## 2025-05-20 PROCEDURE — 2500000004 HC RX 250 GENERAL PHARMACY W/ HCPCS (ALT 636 FOR OP/ED): Mod: JW,TB

## 2025-05-20 RX ORDER — LORAZEPAM 0.5 MG/1
0.5 TABLET ORAL EVERY 4 HOURS PRN
Status: DISCONTINUED | OUTPATIENT
Start: 2025-05-20 | End: 2025-05-20

## 2025-05-20 RX ORDER — LORAZEPAM 2 MG/ML
INJECTION INTRAMUSCULAR
Status: COMPLETED
Start: 2025-05-20 | End: 2025-05-20

## 2025-05-20 RX ORDER — LORAZEPAM 2 MG/ML
0.5 INJECTION INTRAMUSCULAR EVERY 4 HOURS PRN
Status: DISCONTINUED | OUTPATIENT
Start: 2025-05-20 | End: 2025-05-21 | Stop reason: HOSPADM

## 2025-05-20 RX ORDER — HYDROMORPHONE HYDROCHLORIDE 1 MG/ML
0.4 INJECTION, SOLUTION INTRAMUSCULAR; INTRAVENOUS; SUBCUTANEOUS
Status: DISCONTINUED | OUTPATIENT
Start: 2025-05-20 | End: 2025-05-21 | Stop reason: HOSPADM

## 2025-05-20 RX ORDER — MICAFUNGIN SODIUM 100 MG/5ML
100 INJECTION, POWDER, LYOPHILIZED, FOR SOLUTION INTRAVENOUS EVERY 24 HOURS
Status: DISCONTINUED | OUTPATIENT
Start: 2025-05-20 | End: 2025-05-20

## 2025-05-20 RX ORDER — GLYCOPYRROLATE 0.2 MG/ML
0.2 INJECTION INTRAMUSCULAR; INTRAVENOUS EVERY 4 HOURS PRN
Status: DISCONTINUED | OUTPATIENT
Start: 2025-05-20 | End: 2025-05-21 | Stop reason: HOSPADM

## 2025-05-20 RX ORDER — OXYCODONE HYDROCHLORIDE 5 MG/1
10 TABLET ORAL EVERY 6 HOURS PRN
Refills: 0 | Status: DISCONTINUED | OUTPATIENT
Start: 2025-05-20 | End: 2025-05-20

## 2025-05-20 RX ORDER — OXYCODONE HYDROCHLORIDE 5 MG/1
5 TABLET ORAL EVERY 6 HOURS PRN
Refills: 0 | Status: DISCONTINUED | OUTPATIENT
Start: 2025-05-20 | End: 2025-05-20

## 2025-05-20 RX ADMIN — DIGOXIN 125 MCG: 0.25 INJECTION INTRAMUSCULAR; INTRAVENOUS at 01:43

## 2025-05-20 RX ADMIN — HEPARIN SODIUM 5000 UNITS: 5000 INJECTION, SOLUTION INTRAVENOUS; SUBCUTANEOUS at 04:37

## 2025-05-20 RX ADMIN — INSULIN LISPRO 2 UNITS: 100 INJECTION, SOLUTION INTRAVENOUS; SUBCUTANEOUS at 12:51

## 2025-05-20 RX ADMIN — SODIUM CHLORIDE, SODIUM LACTATE, POTASSIUM CHLORIDE, AND CALCIUM CHLORIDE 500 ML: 600; 310; 30; 20 INJECTION, SOLUTION INTRAVENOUS at 11:06

## 2025-05-20 RX ADMIN — HEPARIN SODIUM 5000 UNITS: 5000 INJECTION, SOLUTION INTRAVENOUS; SUBCUTANEOUS at 12:52

## 2025-05-20 RX ADMIN — OXYCODONE 5 MG: 5 TABLET ORAL at 14:18

## 2025-05-20 RX ADMIN — NOREPINEPHRINE BITARTRATE 0.22 MCG/KG/MIN: 8 INJECTION, SOLUTION INTRAVENOUS at 11:06

## 2025-05-20 RX ADMIN — DIGOXIN 125 MCG: 0.25 INJECTION INTRAMUSCULAR; INTRAVENOUS at 14:18

## 2025-05-20 RX ADMIN — VASOPRESSIN 0.03 UNITS/MIN: 0.2 INJECTION INTRAVENOUS at 10:22

## 2025-05-20 RX ADMIN — MICAFUNGIN SODIUM 100 MG: 100 INJECTION, POWDER, LYOPHILIZED, FOR SOLUTION INTRAVENOUS at 11:06

## 2025-05-20 RX ADMIN — DIGOXIN 125 MCG: 0.25 INJECTION INTRAMUSCULAR; INTRAVENOUS at 08:16

## 2025-05-20 RX ADMIN — TAMSULOSIN HYDROCHLORIDE 0.4 MG: 0.4 CAPSULE ORAL at 08:16

## 2025-05-20 RX ADMIN — HYDROCORTISONE SODIUM SUCCINATE 50 MG: 100 INJECTION, POWDER, FOR SOLUTION INTRAMUSCULAR; INTRAVENOUS at 10:40

## 2025-05-20 RX ADMIN — HYDROMORPHONE HYDROCHLORIDE 0.4 MG: 1 INJECTION, SOLUTION INTRAMUSCULAR; INTRAVENOUS; SUBCUTANEOUS at 16:22

## 2025-05-20 RX ADMIN — Medication 12 L/MIN: at 08:00

## 2025-05-20 RX ADMIN — LORAZEPAM 0.5 MG: 2 INJECTION INTRAMUSCULAR at 16:08

## 2025-05-20 RX ADMIN — HYDROMORPHONE HYDROCHLORIDE 0.4 MG: 1 INJECTION, SOLUTION INTRAMUSCULAR; INTRAVENOUS; SUBCUTANEOUS at 16:07

## 2025-05-20 RX ADMIN — LORAZEPAM 0.5 MG: 2 INJECTION, SOLUTION INTRAMUSCULAR; INTRAVENOUS at 16:08

## 2025-05-20 RX ADMIN — Medication 60 L/MIN: at 12:00

## 2025-05-20 RX ADMIN — INSULIN LISPRO 1 UNITS: 100 INJECTION, SOLUTION INTRAVENOUS; SUBCUTANEOUS at 09:15

## 2025-05-20 ASSESSMENT — RESPIRATORY DISTRESS OBSERVATION SCALE (RDOS)
PARADOXICAL BREATHING PATTERN: 0 - NONE
INVOLUNTARY NASAL FLARING: 0 - NONE
HEART RATE PER MINUTE: 0 - <90 BEATS
RDOS TOTAL SCORE: 3
GRUNTING AT END OF EXPIRATION: 0 - NONE
LOOK OF FEAR: 0 - NONE
RESPIRATORY RATE PER MINUTE: 1 - 19-30 BREATHS
RESPIRATORY RATE PER MINUTE: 0 - <19 BREATHS
GRUNTING AT END OF EXPIRATION: 0 - NONE
RESPIRATORY RATE PER MINUTE: 0 - <19 BREATHS
INVOLUNTARY NASAL FLARING: 0 - NONE
ACCESSORY MUSCLE RISE IN CLAVICLE DURING INSPIRATION: 0 - NONE
HEART RATE PER MINUTE: 1 - 90-109 BEATS
HEART RATE PER MINUTE: 1 - 90-109 BEATS
RESTLESS NONPURPOSEFUL MOVEMENTS: 0 - NONE
RESTLESS NONPURPOSEFUL MOVEMENTS: 0 - NONE
LOOK OF FEAR: 0 - NONE
INVOLUNTARY NASAL FLARING: 0 - NONE
PARADOXICAL BREATHING PATTERN: 0 - NONE
ACCESSORY MUSCLE RISE IN CLAVICLE DURING INSPIRATION: 1 - SLIGHT RISE
RDOS TOTAL SCORE: 1
RESTLESS NONPURPOSEFUL MOVEMENTS: 0 - NONE
LOOK OF FEAR: 0 - NONE
ACCESSORY MUSCLE RISE IN CLAVICLE DURING INSPIRATION: 0 - NONE
RDOS TOTAL SCORE: 0
PARADOXICAL BREATHING PATTERN: 0 - NONE
GRUNTING AT END OF EXPIRATION: 0 - NONE

## 2025-05-20 ASSESSMENT — PAIN SCALES - GENERAL
PAINLEVEL_OUTOF10: 0 - NO PAIN
PAINLEVEL_OUTOF10: 0 - NO PAIN
PAINLEVEL_OUTOF10: 4
PAINLEVEL_OUTOF10: 0 - NO PAIN
PAINLEVEL_OUTOF10: 0 - NO PAIN

## 2025-05-20 ASSESSMENT — PAIN - FUNCTIONAL ASSESSMENT
PAIN_FUNCTIONAL_ASSESSMENT: 0-10

## 2025-05-20 ASSESSMENT — PAIN DESCRIPTION - LOCATION: LOCATION: ABDOMEN

## 2025-05-20 NOTE — PROGRESS NOTES
Occupational Therapy                 Therapy Communication Note    Patient Name: Ramana Looney  MRN: [id number]  Department: [facility]  Room: 28/28-A  Today's Date: 5/20/2025     Discipline: Occupational Therapy    Missed Visit: OT Missed Visit: Yes     Missed Visit Reason: Missed Visit Reason:  (Communicated with RN; Pt currently with MAP of 57. RN plans to increase Levo. Not appropriate for OT at this time.)    Missed Time: Attempt 0938    Comment:

## 2025-05-20 NOTE — CARE PLAN
Problem: Pain - Adult  Goal: Verbalizes/displays adequate comfort level or baseline comfort level  Outcome: Progressing     Problem: Safety - Adult  Goal: Free from fall injury  Outcome: Progressing     Problem: Discharge Planning  Goal: Discharge to home or other facility with appropriate resources  Outcome: Progressing     Problem: Chronic Conditions and Co-morbidities  Goal: Patient's chronic conditions and co-morbidity symptoms are monitored and maintained or improved  Outcome: Progressing     Problem: Nutrition  Goal: Nutrient intake appropriate for maintaining nutritional needs  Outcome: Progressing     Problem: Fall/Injury  Goal: Not fall by end of shift  Outcome: Progressing  Goal: Be free from injury by end of the shift  Outcome: Progressing  Goal: Verbalize understanding of personal risk factors for fall in the hospital  Outcome: Progressing  Goal: Verbalize understanding of risk factor reduction measures to prevent injury from fall in the home  Outcome: Progressing  Goal: Use assistive devices by end of the shift  Outcome: Progressing  Goal: Pace activities to prevent fatigue by end of the shift  Outcome: Progressing     Problem: Pain  Goal: Takes deep breaths with improved pain control throughout the shift  Outcome: Progressing  Goal: Turns in bed with improved pain control throughout the shift  Outcome: Progressing  Goal: Walks with improved pain control throughout the shift  Outcome: Progressing  Goal: Performs ADL's with improved pain control throughout shift  Outcome: Progressing  Goal: Participates in PT with improved pain control throughout the shift  Outcome: Progressing  Goal: Free from opioid side effects throughout the shift  Outcome: Progressing  Goal: Free from acute confusion related to pain meds throughout the shift  Outcome: Progressing     Problem: Skin  Goal: Decreased wound size/increased tissue granulation at next dressing change  Outcome: Progressing  Flowsheets (Taken 5/20/2025  0813)  Decreased wound size/increased tissue granulation at next dressing change: Protective dressings over bony prominences  Goal: Participates in plan/prevention/treatment measures  Outcome: Progressing  Flowsheets (Taken 5/20/2025 0813)  Participates in plan/prevention/treatment measures: Elevate heels  Goal: Prevent/manage excess moisture  Outcome: Progressing  Flowsheets (Taken 5/20/2025 0813)  Prevent/manage excess moisture:   Follow provider orders for dressing changes   Monitor for/manage infection if present  Goal: Prevent/minimize sheer/friction injuries  Outcome: Progressing  Flowsheets (Taken 5/20/2025 0813)  Prevent/minimize sheer/friction injuries:   Turn/reposition every 2 hours/use positioning/transfer devices   HOB 30 degrees or less  Goal: Promote/optimize nutrition  Outcome: Progressing  Flowsheets (Taken 5/20/2025 0813)  Promote/optimize nutrition:   Offer water/supplements/favorite foods   Monitor/record intake including meals  Goal: Promote skin healing  Outcome: Progressing  Flowsheets (Taken 5/20/2025 0813)  Promote skin healing:   Rotate device position/do not position patient on device   Turn/reposition every 2 hours/use positioning/transfer devices   Protective dressings over bony prominences

## 2025-05-20 NOTE — PROGRESS NOTES
Vancomycin Dosing by Pharmacy- Cessation of Therapy    Consult to pharmacy for vancomycin dosing has been discontinued by the prescriber, pharmacy will sign off at this time.    Please call pharmacy if there are further questions or re-enter a consult if vancomycin is resumed.     Kelly Mandujano, PharmD

## 2025-05-20 NOTE — PROGRESS NOTES
Ramana Looney   84 angie    @@  [facility]/Room: 86535152/28/28-A    Subjective:   No acute event overnight.  Mild SOB  No nausea, vomiting.  Renal function worsening   Hemodynamics are unstable on vaso and levo.  On HFNC for oxygen requirements    Objective:     Meds:             albuterol, 2.5 mg, q4h PRN  HYDROmorphone, 0.4 mg, q3h PRN  oxyCODONE, 10 mg, q6h PRN  oxyCODONE, 5 mg, q6h PRN  oxygen, , Continuous PRN - O2/gases        Vitals:    05/20/25 1500   BP: 123/55   Pulse: 90   Resp: 18   Temp:    SpO2: 93%          Intake/Output Summary (Last 24 hours) at 5/20/2025 1556  Last data filed at 5/20/2025 1500  Gross per 24 hour   Intake 2027.66 ml   Output --   Net 2027.66 ml       General appearance: no distress  Eyes: non-icteric  Skin: no apparent rash  Heart: [id number]  Lungs: CTA bilat mild crackles  Abdomen: soft, nt/nd  Extremities: trace edema bilat  Grant  Neuro: No FND, no asterixis  Access right Ij    Blood Labs:  Results for orders placed or performed during the hospital encounter of 05/17/25 (from the past 24 hours)   POCT GLUCOSE   Result Value Ref Range    POCT Glucose 177 (H) 74 - 99 mg/dL   Blood Gas Venous Full Panel   Result Value Ref Range    POCT pH, Venous 7.31 (L) 7.33 - 7.43 pH    POCT pCO2, Venous 35 (L) 41 - 51 mm Hg    POCT pO2, Venous 52 (H) 35 - 45 mm Hg    POCT SO2, Venous 80 (H) 45 - 75 %    POCT Oxy Hemoglobin, Venous 77.8 (H) 45.0 - 75.0 %    POCT Hematocrit Calculated, Venous 26.0 (L) 41.0 - 52.0 %    POCT Sodium, Venous 134 (L) 136 - 145 mmol/L    POCT Potassium, Venous 4.4 3.5 - 5.3 mmol/L    POCT Chloride, Venous 102 98 - 107 mmol/L    POCT Ionized Calicum, Venous 1.13 1.10 - 1.33 mmol/L    POCT Glucose, Venous 182 (H) 74 - 99 mg/dL    POCT Lactate, Venous 2.7 (H) 0.4 - 2.0 mmol/L    POCT Base Excess, Venous -7.9 (L) -2.0 - 3.0 mmol/L    POCT HCO3 Calculated, Venous 17.6 (L) 22.0 - 26.0 mmol/L    POCT Hemoglobin, Venous 8.7 (L) 13.5 - 17.5 g/dL    POCT Anion Gap, Venous 19.0 10.0  - 25.0 mmol/L    Patient Temperature 37.0 degrees Celsius    FiO2 60 %   Vancomycin, Trough   Result Value Ref Range    Vancomycin, Trough 17.1 5.0 - 20.0 ug/mL   Renal Function Panel   Result Value Ref Range    Glucose 206 (H) 74 - 99 mg/dL    Sodium 135 (L) 136 - 145 mmol/L    Potassium 4.2 3.5 - 5.3 mmol/L    Chloride 98 98 - 107 mmol/L    Bicarbonate 22 21 - 32 mmol/L    Anion Gap 19 10 - 20 mmol/L    Urea Nitrogen 92 (HH) 6 - 23 mg/dL    Creatinine 8.96 (H) 0.50 - 1.30 mg/dL    eGFR 5 (L) >60 mL/min/1.73m*2    Calcium 8.4 (L) 8.6 - 10.6 mg/dL    Phosphorus 5.5 (H) 2.5 - 4.9 mg/dL    Albumin 3.0 (L) 3.4 - 5.0 g/dL   Magnesium   Result Value Ref Range    Magnesium 3.09 (H) 1.60 - 2.40 mg/dL   Troponin I, High Sensitivity   Result Value Ref Range    Troponin I, High Sensitivity (CMC) 1,066 (HH) 0 - 53 ng/L   Lactate   Result Value Ref Range    Lactate 2.4 (H) 0.4 - 2.0 mmol/L   POCT GLUCOSE   Result Value Ref Range    POCT Glucose 198 (H) 74 - 99 mg/dL   POCT GLUCOSE   Result Value Ref Range    POCT Glucose 197 (H) 74 - 99 mg/dL   POCT GLUCOSE   Result Value Ref Range    POCT Glucose 149 (H) 74 - 99 mg/dL   CBC and Auto Differential   Result Value Ref Range    WBC 7.8 4.4 - 11.3 x10*3/uL    nRBC 1.2 (H) 0.0 - 0.0 /100 WBCs    RBC 2.82 (L) 4.50 - 5.90 x10*6/uL    Hemoglobin 8.1 (L) 13.5 - 17.5 g/dL    Hematocrit 23.5 (L) 41.0 - 52.0 %    MCV 83 80 - 100 fL    MCH 28.7 26.0 - 34.0 pg    MCHC 34.5 32.0 - 36.0 g/dL    RDW 24.3 (H) 11.5 - 14.5 %    Platelets 70 (L) 150 - 450 x10*3/uL    Neutrophils % 84.5 40.0 - 80.0 %    Immature Granulocytes %, Automated 1.4 (H) 0.0 - 0.9 %    Lymphocytes % 6.0 13.0 - 44.0 %    Monocytes % 8.1 2.0 - 10.0 %    Eosinophils % 0.0 0.0 - 6.0 %    Basophils % 0.0 0.0 - 2.0 %    Neutrophils Absolute 6.56 (H) 1.60 - 5.50 x10*3/uL    Immature Granulocytes Absolute, Automated 0.11 0.00 - 0.50 x10*3/uL    Lymphocytes Absolute 0.47 (L) 0.80 - 3.00 x10*3/uL    Monocytes Absolute 0.63 0.05 - 0.80  x10*3/uL    Eosinophils Absolute 0.00 0.00 - 0.40 x10*3/uL    Basophils Absolute 0.00 0.00 - 0.10 x10*3/uL   Magnesium   Result Value Ref Range    Magnesium 3.06 (H) 1.60 - 2.40 mg/dL   Coagulation Screen   Result Value Ref Range    Protime 16.3 (H) 9.8 - 12.4 seconds    INR 1.5 (H) 0.9 - 1.1    aPTT 29 26 - 36 seconds   Hepatic function panel   Result Value Ref Range    Albumin 2.9 (L) 3.4 - 5.0 g/dL    Bilirubin, Total 14.3 (H) 0.0 - 1.2 mg/dL    Bilirubin, Direct 8.6 (H) 0.0 - 0.3 mg/dL    Alkaline Phosphatase 357 (H) 33 - 136 U/L     (H) 10 - 52 U/L     (H) 9 - 39 U/L    Total Protein 4.3 (L) 6.4 - 8.2 g/dL   Creatine Kinase   Result Value Ref Range    Creatine Kinase 224 0 - 325 U/L   Hepatitis C RNA, Quantitative, PCR   Result Value Ref Range    Hepatitis C RNA PCR Log      HCV RNA Result Not Detected Not detected   Phosphorus   Result Value Ref Range    Phosphorus 5.5 (H) 2.5 - 4.9 mg/dL   Basic Metabolic Panel   Result Value Ref Range    Glucose 152 (H) 74 - 99 mg/dL    Sodium 138 136 - 145 mmol/L    Potassium 4.3 3.5 - 5.3 mmol/L    Chloride 99 98 - 107 mmol/L    Bicarbonate 21 21 - 32 mmol/L    Anion Gap 22 (H) 10 - 20 mmol/L    Urea Nitrogen 96 (HH) 6 - 23 mg/dL    Creatinine 9.15 (H) 0.50 - 1.30 mg/dL    eGFR 5 (L) >60 mL/min/1.73m*2    Calcium 8.4 (L) 8.6 - 10.6 mg/dL   POCT GLUCOSE   Result Value Ref Range    POCT Glucose 163 (H) 74 - 99 mg/dL   Blood Gas Venous Full Panel   Result Value Ref Range    POCT pH, Venous 7.29 (L) 7.33 - 7.43 pH    POCT pCO2, Venous 31 (L) 41 - 51 mm Hg    POCT pO2, Venous 54 (H) 35 - 45 mm Hg    POCT SO2, Venous 79 (H) 45 - 75 %    POCT Oxy Hemoglobin, Venous 77.1 (H) 45.0 - 75.0 %    POCT Hematocrit Calculated, Venous 27.0 (L) 41.0 - 52.0 %    POCT Sodium, Venous 133 (L) 136 - 145 mmol/L    POCT Potassium, Venous 4.8 3.5 - 5.3 mmol/L    POCT Chloride, Venous 101 98 - 107 mmol/L    POCT Ionized Calicum, Venous 1.09 (L) 1.10 - 1.33 mmol/L    POCT Glucose,  Venous 187 (H) 74 - 99 mg/dL    POCT Lactate, Venous 3.7 (H) 0.4 - 2.0 mmol/L    POCT Base Excess, Venous -10.6 (L) -2.0 - 3.0 mmol/L    POCT HCO3 Calculated, Venous 14.9 (L) 22.0 - 26.0 mmol/L    POCT Hemoglobin, Venous 8.9 (L) 13.5 - 17.5 g/dL    POCT Anion Gap, Venous 22.0 10.0 - 25.0 mmol/L    Patient Temperature 37.0 degrees Celsius    FiO2 60 %   POCT GLUCOSE   Result Value Ref Range    POCT Glucose 216 (H) 74 - 99 mg/dL              ASSESSMENT:  #MARY on CKD 3- likely multifactorial- pre-renal in setting of diarrhea, ATN from recurrent hypotension OSH requiring HD, HRS is one of the differential   -acidbase are stable at the moment.  -Electrolytes are acceptable.  -Baseline cr 1.4-1.5  -Hemodynamically -on levo and vaso  -UOP nil   -FeNa 10.1  -UA RbCs, oxalate crystals, Bilirubin, 2+ proteins,   -US renal is pending     Recommendations:  -Will hold dialysis for now.Family does not want dialysis so will continue to follow labs and will offer RRT if family changes the decision.  -Strict I/O.  -Dose all medication to GFR.  -Avoid Nephrotoxins, contrast and keep SBP>90mmHg  -Will follow.             Jenna Reyna MD  Nephrology Fellow   Daytime / Weekend Renal Pager [phone]  After 7 pm Emergencies [phone] Pager [phone]

## 2025-05-20 NOTE — PROGRESS NOTES
"Medical Intensive Care - Daily Progress Note   Subjective    Ramana Looney is a 84 y.o. year old male patient admitted on 5/17/2025 with following ICU needs: undifferentiated shock requiring pressors, acute hypoxemic respiratory failure requiring HFNC.    Interval History:  - Extended run of A flutter with rates up to 140's yesterday evening, started on digoxin  - Patient remained asymptomatic  - Intial exam this AM, Aox2, comfortable in bed. On levophed 0.12 and 12L NC. Denies CP or palpitations, does have some abdominal distension. Patient required increased O2 and pressor requirements over course of the morning without any change in his subjective symptoms. Ultimately up to 0.2 levophed, added vasopressin, up to HFNC 60L 60% FiO2    Meds    Scheduled medications  Scheduled Medications[1]  Continuous medications  Continuous Medications[2]  PRN medications  PRN Medications[3]     Objective    Blood pressure (!) 102/45, pulse 94, temperature 36.3 °C (97.3 °F), temperature source Temporal, resp. rate 21, height 1.753 m (5' 9\"), weight 73 kg (160 lb 15 oz), SpO2 92%.     Physical Exam  Constitutional:       General: He is not in acute distress.  HENT:      Head: Normocephalic and atraumatic.   Eyes:      General: Scleral icterus present.   Cardiovascular:      Rate and Rhythm: Normal rate and regular rhythm.      Pulses: Normal pulses.      Heart sounds: Normal heart sounds.   Pulmonary:      Effort: Pulmonary effort is normal.      Comments: Diminished bilaterally R>L  Abdominal:      General: There is distension.      Palpations: Abdomen is soft.   Musculoskeletal:      Right lower leg: No edema.      Left lower leg: No edema.   Skin:     General: Skin is warm and dry.      Coloration: Skin is jaundiced.   Neurological:      Mental Status: He is lethargic and disoriented.      Comments: Aox2 (person, place)   Psychiatric:         Mood and Affect: Mood normal.         Behavior: Behavior normal.      "       Intake/Output Summary (Last 24 hours) at 5/20/2025 0711  Last data filed at 5/20/2025 0651  Gross per 24 hour   Intake 1563.03 ml   Output --   Net 1563.03 ml     Labs:   Results from last 72 hours   Lab Units 05/20/25  0434 05/19/25  1947 05/19/25  0459   SODIUM mmol/L 138 135* 136   POTASSIUM mmol/L 4.3 4.2 4.4   CHLORIDE mmol/L 99 98 98   CO2 mmol/L 21 22 22   BUN mg/dL 96* 92* 80*   CREATININE mg/dL 9.15* 8.96* 8.31*   GLUCOSE mg/dL 152* 206* 163*   CALCIUM mg/dL 8.4* 8.4* 8.2*   ANION GAP mmol/L 22* 19 20   EGFR mL/min/1.73m*2 5* 5* 6*   PHOSPHORUS mg/dL 5.5* 5.5* 5.7*      Results from last 72 hours   Lab Units 05/20/25  0434 05/19/25  0459 05/18/25  1059   WBC AUTO x10*3/uL 7.8 8.5 8.0   HEMOGLOBIN g/dL 8.1* 8.0* 7.5*   HEMATOCRIT % 23.5* 23.4* 23.9*   PLATELETS AUTO x10*3/uL 70* 83* 85*   NEUTROS PCT AUTO % 84.5 82.0 83.1   LYMPHS PCT AUTO % 6.0 8.1 7.3   MONOS PCT AUTO % 8.1 8.4 7.7   EOS PCT AUTO % 0.0 0.0 0.2          Results from last 72 hours   Lab Units 05/19/25  1650 05/18/25  1816 05/18/25  1059   POCT PH, VENOUS pH 7.31* 7.33 7.35   POCT PCO2, VENOUS mm Hg 35* 35* 36*   POCT PO2, VENOUS mm Hg 52* 54* 51*        Micro/ID:     Lab Results   Component Value Date    BLOODCULT No growth at 2 days 05/17/2025    BLOODCULT No growth at 2 days 05/17/2025       Summary of key imaging results from the last 24 hours    CXR 5/17  IMPRESSION:  1. Right more than left bibasilar opacity which may be due to  atelectasis or infectious infiltrate.  2. Small right pleural effusion.  3. Recommend follow-up radiograph.  4. Medical devices as above.    Assessment and Plan     Assessment:   Ramana Looney is a 84 y.o. male with known diabetes, hyperlipidemia and hypertension who is a transfer from [facility] who initially presented with abdominal pain and diarrhea. His course is complicated by MARY with hyperkalemia needing emergency renal replacement therapy (did 4 times per intake note) and there  was also noted multiple nodules with varying size concerning for liver cirrhosis vs hepatocellular CA needing biopsy. His hepatitis panel was negative and stool pathogen PCR was negative as well. He was transfer at OSH in the ICU due worsening renal and liver function and acidosis. He had episodes of hypotension requiring pressors. Per OSH notes, there is a concern for line infection that is why his antibiotics was broaden to Meropenem. Sepsis work up was started as well at OSH.  Patient was then transferred to our institution for higher level of care for his undifferentiated shock.     Discussions ongoing with family about possible transition to comfort care. Family does not want to pursue CRRT at this time.    Mechanical Ventilation: none  Sedation/Analgesia:  none  Restraints: no    Summary for 05/20/25:  Stat CXR did not show significant increase in pulmonary edema, IVC collapsible, will give another 500mL IVF bolus   VBG to trend lactate  Start micafungin  Ongoing discussions regarding GOC  Stress dose steroids  Levophed, start vasopressin, will hold off on A line until decision made regarding GOC  Follow up hepatology recs  Acute hepatitis panel, SHIV, AMA, ASMA negative  Follow up urine and blood cultures (NGTD)  Trend LFT's, coags  C/w vanc and meropenem    Plan:  Neuro:  # Metabolic encephalopathy, improving  - most likely from possible liver cirrhosis vs uremia  - C/w Lactulose 20g q8h  - Other workup below     Cardiovascular:  # Undifferentiated Shock  :: etiology - septic (source: UTI vs PNA vs ?SBP) vs cardiogenic (tropinemia, arrhythmia)  :: last stress echo 9/2024 Normal global left ventricular systolic function.  :: lactate 4.8 on admission, peaked 14 at OSH, 2.6 5/18  :: Started on pressors (levophed) 05/18   - MAP goal > 60, wean pressors as tolerated  - Stat CXR did not show significant increase in pulmonary edema, IVC collapsible, will give another 500mL IVF bolus   - VBG to trend lactate  - Start  micafungin  - Ongoing discussions regarding GOC  - Levophed, start vasopressin, will hold off on A line until decision made regarding GOC  - Stress dose steroids  - Follow up blood cultures 5/17 (NGTD), procalc, sputum cx  - Follow up cultures at OSH ([facility])  - C/w vanc meropenem, renally dose    #Troponinemia  #Type II MI  # Atypical afLutter  :: there was report that patient was started on Cardizem gtt due to runs of tachyarrhthmia at OSH.  :: There was no noted recurrence while on gtt but the gtt was stopped due to c/f undifferentiaed shock  :: Tropin peaked at 1.67, no longer trending  ::Repeated transient episodes of atypicla flutter on telemetry, patient asymptomatic  ::TTE 05/19 hyperdynamic EF (70-75%)  - Cardiology consulted, appreciate recs  - Hold ASA, AC given recent anemia requiring transfusion and thrombocytopenia  - Will need coronary angiography in the future to evaluate for CAD  - C/w digoxin 125 q6 x4 doses  - C/w telemetry monitoring     # HTN  - hold Amlodipine 5 mg once a day, atenolol 25 once a day and valsartan 80 mg once a day in the setting of shock     # DLD  - HOLD simvastatin 40 mg tPO once a day and Gemfibrozil 600 mg BID in the setting of worsening LFTs        Respiratory:  # Acute hypoxic respiratory failure  # C/f PNA  :: Having increased O2 requirements, currently on HFNC 12 L  :: Likely due to PNA vs vol overload  - No significant change in CXR 5/20  - HFNC as needed  - Vanc chandrakant as above  - follow up blood culture here, follow up MRSA nares, procalc, sputum cx  - follow up cultures at OSH ([facility])  - can repeat CXR to monitor progression/resolution       GI:  # Multiple liver nodules c/f Cirrhosis to r/o HCC  # Transaminitis/ Hepatocellular Liver Injury  # Hyperbilirubinemia    MELD 3.0: 34 at 5/20/2025  4:34 AM  MELD-Na: 34 at 5/20/2025  4:34 AM  Calculated from:  Serum Creatinine: 9.15 mg/dL (Using max of 3 mg/dL) at 5/20/2025  4:34 AM  Serum Sodium: 138 mmol/L (Using max  of 137 mmol/L) at 5/20/2025  4:34 AM  Total Bilirubin: 14.3 mg/dL at 5/20/2025  4:34 AM  Serum Albumin: 2.9 g/dL at 5/20/2025  4:34 AM  INR(ratio): 1.5 at 5/20/2025  4:34 AM  Age at listing (hypothetical): 84 years  Sex: Male at 5/20/2025  4:34 AM    :: Only known risk factor as of now is possible MASLD  :: Multiple nodules  0.5-3.5 cm mass lesions on MRI liver with noted portal HTN on RUQ with Doppler. No obstructive disease on MRCP.  :: Per OSH chart, hepatitis panel was negative. Ferritin 108.  :: EGD at OSH 5/12 showing no varices or active bleed, evidence of PHG, normal esophagus and duodenum. Bx taken, showed no H pylori, chronic mild gastritis.  :: No concern for HRS at this time  :: Acute hepatitis panel, SHIV, AMA, Anti smooth muscle antibody, Ceruloplasmin all negative, AAT pending  - Hepatology consulted, appreciate recs  - Acute hepatitis panel, SHIV, AMA, ASMA negative  - US liver with doppler ordered  - Trend MELD labs  - Lactulose as above        Renal/:  # MARY on CKD 3, anuric  # Concern for HRS  ::Not a candidate for terlipressin  ::Baseline creatinine per OSH 1.7  ::Started emergency RRT and had 4 sessions at OSH  ::Likely etiology is pre-renal vs ATN iso recent shock, BP changes, diarrhea; unlikely HRS at this time  - Consulted Nephrology appreciate recs  - Holding off on CRRT today pending [facility]  - RRT per nephrology  - Avoid nephrotoxins  - Strict I/O  - Renally dose meds       # Possible UTI  ::Started on Ceftriaxone at OSH for UTI but broaden to Meropenem iso of shock  - Antibiotics as above     # BPH  - c/w tamsulosin 0.4 mg qd      Heme/Onc:  # Anemia  ::baseline 10-11  ::05/18 Hgb to 6.4, s/p 1u RBC   ::no obvious sites of active bleeding, EGD 5/12 at OSH showed no active bleeding  ::T and S ordered; consent for blood secured on admission  ::TEG, fibrinogen ordered  - Hgb stable since transfusion  - CTM     Endo:  # DM type 2  - Holding Metformin 1g once a day and glyburide 5 mg BID  - SSI  inpatient     ID:  - see above    ICU Check List       FEN  Fluids: 500 mL bolus  Electrolytes: PRN  Nutrition: regular diet  Prophylaxis:  DVT ppx: heparin subcutaneous  GI ppx: PPI  Bowel care: vanc-chandrakant  Hardware:         CVC 05/17/25 Triple lumen Tunneled Left Internal jugular (Active)   Placement Date/Time: 05/17/25 1500   Lumen Type: Triple lumen  CVC Type: Tunneled  Orientation: Left  Location: Internal jugular   Number of days: 1       Hemodialysis Cath 05/15/25 Double lumen Right Subclavian (Active)   Placement Date/Time: 05/15/25 1400   Hemodialysis Catheter Type: Double lumen  Orientation: Right  Access Location: Subclavian   Number of days: 3       Social:  Code: DNR and No Intubation    HPOA: Shoshana Looney (spouse) [phone]   [facility]            Basim Dc MD   05/20/25 at 7:11 AM     Disclaimer: Documentation completed with the information available at the time of input. The times in the chart may not be reflective of actual patient care times, interventions, or procedures. Documentation occurs after the physical care of the patient.          [1] digoxin, 125 mcg, intravenous, q6h  heparin (porcine), 5,000 Units, subcutaneous, q8h  insulin lispro, 0-5 Units, subcutaneous, q4h  lactulose, 20 g, oral, q8h  meropenem, 500 mg, intravenous, Every Day  tamsulosin, 0.4 mg, oral, Daily     [2] norepinephrine, 0-1 mcg/kg/min, Last Rate: 0.12 mcg/kg/min (05/20/25 0651)     [3] PRN medications: albuterol, oxygen, vancomycin

## 2025-05-20 NOTE — ACP (ADVANCE CARE PLANNING)
Confirming Previous Code Status:   Advance Care Planning Note     Discussion Date: 05/20/25   Discussion Participants: patient, spouse, and son    The patient wishes to discuss Advance Care Planning today and the following is a brief summary of our discussion.     Patient has capacity to make their own medical decisions: Yes  Health Care Agent/Surrogate Decision Maker documented in chart: Yes, wife    Documents on file and valid:  Advance Directive/Living Will: No   Health Care Power of : No  Other: NA    Communication of Medical Status/Prognosis:   Discussion had with patient and family at bedside. Communicated to patient that his clinical status was declining in the context of acute renal failure, liver failure, possible infection, worsening shock requiring multiple pressors, and acute respiratory failure with increased oxygen requirement.    Communication of Treatment Goals/Options:   Patient and family both expressed desire to leave the hospital in favor of the comfort of his own home. Relayed to patient that in order to try to go home, we would need to transition to comfort measures only, which entailed stopping all treatments except pain/comfort medications and supplemental oxygen, and that in doing so he may pass before leaving the hospital. Patient and family agreed that their desires aligned with transitioning to comfort care as opposed to pursuing continued medical interventions.     Treatment Decisions  Goals of Care: comfort is paramount; other goals are not relevant or achievable     Team Members  Basim Dc MD    Time Statement: Total face to face time spent on advance care planning was 30 minutes with 20 minutes spent in counseling, including the explanation.    Basim Dc MD  5/20/2025 4:12 PM

## 2025-05-20 NOTE — PROGRESS NOTES
Spiritual Care Visit  Spiritual Care Request    Reason for Visit:  Routine Visit: Introduction     Request Received From:  Referral From: Verbal    Focus of Care:  Visited With: Patient and family together       Refer to :  Referral To:        Spiritual Care Annotation    Annotation:  Spiritual Care visit declined.  will remain available for support.     Signed: Stanford Parra, Clinical Care

## 2025-05-20 NOTE — PROGRESS NOTES
SOCIAL WORK NOTE   -ICU Treatment Plan: SW met with team for interdisciplinary rounds.  plan for comfort care, no hospice needs at this time   -Support System: wife  -Planned Disposition: GOC   -Additional information:  Social work to follow.  LOBO Simental, LISW-S ([id number])

## 2025-05-20 NOTE — PROGRESS NOTES
"[facility]  [facility] [place]  CONSULT FOLLOW-UP     Reason For Consult  concerns of new cirrhosis, multiple hepatic lesions on MRI concerning for possible HCC, and question of HRS as etiology for HRS    History Of Present Illness  Ramana Looney is a 84 y.o. male with PMH HTN, HLD, DM II, BPH who originally presented to [facility] on 5/5 for one week of abdominal pain and diarrhea, ultimately found to have complicated UTI. Course further complicated by shock (multifactorial due to sepsis, hypovolemia, vasoplegia) c/b ischemic hepatitis and oliguric MARY thought 2/2 HTN requiring emergent RRT x4 at OSH, atrial flutter requiring beta-blockade, and acute hypoxemic respiratory failure requiring HFNC (requiring 10L HFNC). He was transferred to [facility] MICU on 5/17/2025 for higher level of care. Hepatology is consulted for concerns of new cirrhosis, multiple hepatic lesions on MRI concerning for possible HCC, and question of HRS as etiology for HRS      SUBJECTIVE     NAEO   Changed to airvo this morning  Increasing pressor requirements, now on 0.2 levophed and 0.03 vasopressin  Interesting reports his breathing feels ok    EXAM     Last Recorded Vitals  Blood pressure (!) 135/47, pulse 96, temperature 36.6 °C (97.9 °F), resp. rate 18, height 1.753 m (5' 9\"), weight 73 kg (160 lb 15 oz), SpO2 94%.      Intake/Output Summary (Last 24 hours) at 5/20/2025 1335  Last data filed at 5/20/2025 1308  Gross per 24 hour   Intake 1980.22 ml   Output --   Net 1980.22 ml          Physical Exam  General: thin/cachectic,  HEENT: PERRLA, EOM intact, no scleral icterus, dry MM, wearing HFNC, SOB with talking  Respiratory: SOB with talking  Cardiovascular: RRR, no murmurs/rubs/gallops  Abdomen: Soft, nontender, nondistended, bowel sounds present, no masses palpated, no organomeagly  Extremities: no edema, no asterixis  Neuro: alert and oriented, CNII-XII grossly intact,, generalized " weakness      OBJECTIVE                                                                              Medications           Current Medications[1]                                                                            Labs     Results for orders placed or performed during the hospital encounter of 05/17/25 (from the past 24 hours)   POCT GLUCOSE   Result Value Ref Range    POCT Glucose 177 (H) 74 - 99 mg/dL   Blood Gas Venous Full Panel   Result Value Ref Range    POCT pH, Venous 7.31 (L) 7.33 - 7.43 pH    POCT pCO2, Venous 35 (L) 41 - 51 mm Hg    POCT pO2, Venous 52 (H) 35 - 45 mm Hg    POCT SO2, Venous 80 (H) 45 - 75 %    POCT Oxy Hemoglobin, Venous 77.8 (H) 45.0 - 75.0 %    POCT Hematocrit Calculated, Venous 26.0 (L) 41.0 - 52.0 %    POCT Sodium, Venous 134 (L) 136 - 145 mmol/L    POCT Potassium, Venous 4.4 3.5 - 5.3 mmol/L    POCT Chloride, Venous 102 98 - 107 mmol/L    POCT Ionized Calicum, Venous 1.13 1.10 - 1.33 mmol/L    POCT Glucose, Venous 182 (H) 74 - 99 mg/dL    POCT Lactate, Venous 2.7 (H) 0.4 - 2.0 mmol/L    POCT Base Excess, Venous -7.9 (L) -2.0 - 3.0 mmol/L    POCT HCO3 Calculated, Venous 17.6 (L) 22.0 - 26.0 mmol/L    POCT Hemoglobin, Venous 8.7 (L) 13.5 - 17.5 g/dL    POCT Anion Gap, Venous 19.0 10.0 - 25.0 mmol/L    Patient Temperature 37.0 degrees Celsius    FiO2 60 %   Vancomycin, Trough   Result Value Ref Range    Vancomycin, Trough 17.1 5.0 - 20.0 ug/mL   Renal Function Panel   Result Value Ref Range    Glucose 206 (H) 74 - 99 mg/dL    Sodium 135 (L) 136 - 145 mmol/L    Potassium 4.2 3.5 - 5.3 mmol/L    Chloride 98 98 - 107 mmol/L    Bicarbonate 22 21 - 32 mmol/L    Anion Gap 19 10 - 20 mmol/L    Urea Nitrogen 92 (HH) 6 - 23 mg/dL    Creatinine 8.96 (H) 0.50 - 1.30 mg/dL    eGFR 5 (L) >60 mL/min/1.73m*2    Calcium 8.4 (L) 8.6 - 10.6 mg/dL    Phosphorus 5.5 (H) 2.5 - 4.9 mg/dL    Albumin 3.0 (L) 3.4 - 5.0 g/dL   Magnesium   Result Value Ref Range    Magnesium 3.09 (H) 1.60 - 2.40 mg/dL    Troponin I, High Sensitivity   Result Value Ref Range    Troponin I, High Sensitivity (CMC) 1,066 (HH) 0 - 53 ng/L   Lactate   Result Value Ref Range    Lactate 2.4 (H) 0.4 - 2.0 mmol/L   POCT GLUCOSE   Result Value Ref Range    POCT Glucose 198 (H) 74 - 99 mg/dL   POCT GLUCOSE   Result Value Ref Range    POCT Glucose 197 (H) 74 - 99 mg/dL   POCT GLUCOSE   Result Value Ref Range    POCT Glucose 149 (H) 74 - 99 mg/dL   CBC and Auto Differential   Result Value Ref Range    WBC 7.8 4.4 - 11.3 x10*3/uL    nRBC 1.2 (H) 0.0 - 0.0 /100 WBCs    RBC 2.82 (L) 4.50 - 5.90 x10*6/uL    Hemoglobin 8.1 (L) 13.5 - 17.5 g/dL    Hematocrit 23.5 (L) 41.0 - 52.0 %    MCV 83 80 - 100 fL    MCH 28.7 26.0 - 34.0 pg    MCHC 34.5 32.0 - 36.0 g/dL    RDW 24.3 (H) 11.5 - 14.5 %    Platelets 70 (L) 150 - 450 x10*3/uL    Neutrophils % 84.5 40.0 - 80.0 %    Immature Granulocytes %, Automated 1.4 (H) 0.0 - 0.9 %    Lymphocytes % 6.0 13.0 - 44.0 %    Monocytes % 8.1 2.0 - 10.0 %    Eosinophils % 0.0 0.0 - 6.0 %    Basophils % 0.0 0.0 - 2.0 %    Neutrophils Absolute 6.56 (H) 1.60 - 5.50 x10*3/uL    Immature Granulocytes Absolute, Automated 0.11 0.00 - 0.50 x10*3/uL    Lymphocytes Absolute 0.47 (L) 0.80 - 3.00 x10*3/uL    Monocytes Absolute 0.63 0.05 - 0.80 x10*3/uL    Eosinophils Absolute 0.00 0.00 - 0.40 x10*3/uL    Basophils Absolute 0.00 0.00 - 0.10 x10*3/uL   Magnesium   Result Value Ref Range    Magnesium 3.06 (H) 1.60 - 2.40 mg/dL   Coagulation Screen   Result Value Ref Range    Protime 16.3 (H) 9.8 - 12.4 seconds    INR 1.5 (H) 0.9 - 1.1    aPTT 29 26 - 36 seconds   Hepatic function panel   Result Value Ref Range    Albumin 2.9 (L) 3.4 - 5.0 g/dL    Bilirubin, Total 14.3 (H) 0.0 - 1.2 mg/dL    Bilirubin, Direct 8.6 (H) 0.0 - 0.3 mg/dL    Alkaline Phosphatase 357 (H) 33 - 136 U/L     (H) 10 - 52 U/L     (H) 9 - 39 U/L    Total Protein 4.3 (L) 6.4 - 8.2 g/dL   Creatine Kinase   Result Value Ref Range    Creatine Kinase 224 0 -  325 U/L   Hepatitis C RNA, Quantitative, PCR   Result Value Ref Range    Hepatitis C RNA PCR Log      HCV RNA Result Not Detected Not detected   Phosphorus   Result Value Ref Range    Phosphorus 5.5 (H) 2.5 - 4.9 mg/dL   Basic Metabolic Panel   Result Value Ref Range    Glucose 152 (H) 74 - 99 mg/dL    Sodium 138 136 - 145 mmol/L    Potassium 4.3 3.5 - 5.3 mmol/L    Chloride 99 98 - 107 mmol/L    Bicarbonate 21 21 - 32 mmol/L    Anion Gap 22 (H) 10 - 20 mmol/L    Urea Nitrogen 96 (HH) 6 - 23 mg/dL    Creatinine 9.15 (H) 0.50 - 1.30 mg/dL    eGFR 5 (L) >60 mL/min/1.73m*2    Calcium 8.4 (L) 8.6 - 10.6 mg/dL   POCT GLUCOSE   Result Value Ref Range    POCT Glucose 163 (H) 74 - 99 mg/dL   Blood Gas Venous Full Panel   Result Value Ref Range    POCT pH, Venous 7.29 (L) 7.33 - 7.43 pH    POCT pCO2, Venous 31 (L) 41 - 51 mm Hg    POCT pO2, Venous 54 (H) 35 - 45 mm Hg    POCT SO2, Venous 79 (H) 45 - 75 %    POCT Oxy Hemoglobin, Venous 77.1 (H) 45.0 - 75.0 %    POCT Hematocrit Calculated, Venous 27.0 (L) 41.0 - 52.0 %    POCT Sodium, Venous 133 (L) 136 - 145 mmol/L    POCT Potassium, Venous 4.8 3.5 - 5.3 mmol/L    POCT Chloride, Venous 101 98 - 107 mmol/L    POCT Ionized Calicum, Venous 1.09 (L) 1.10 - 1.33 mmol/L    POCT Glucose, Venous 187 (H) 74 - 99 mg/dL    POCT Lactate, Venous 3.7 (H) 0.4 - 2.0 mmol/L    POCT Base Excess, Venous -10.6 (L) -2.0 - 3.0 mmol/L    POCT HCO3 Calculated, Venous 14.9 (L) 22.0 - 26.0 mmol/L    POCT Hemoglobin, Venous 8.9 (L) 13.5 - 17.5 g/dL    POCT Anion Gap, Venous 22.0 10.0 - 25.0 mmol/L    Patient Temperature 37.0 degrees Celsius    FiO2 60 %   POCT GLUCOSE   Result Value Ref Range    POCT Glucose 216 (H) 74 - 99 mg/dL                                                                              Imaging:     MRI shows multiple, diffuse hepatic lesions varying 0.5 to 3.5 cm in size and focal, nodular regenerative hyperplasia.                                                                           GI Procedures     EGD conducted 5/12/25 showed normal esophagus, PHG, gastritis (negative h pylori), and unremarkable duodenum.       ASSESSMENT / PLAN     ASSESSMENT/PLAN:  Ramana Looney is a 84 y.o. male with PMH HTN, HLD, DM II, BPH who originally presented to [facility] on 5/5 for one week of abdominal pain and diarrhea, ultimately found to have complicated UTI. Course further complicated by shock (multifactorial due to sepsis, hypovolemia, vasoplegia) c/b ischemic hepatitis and oliguric MARY thought 2/2 HTN requiring emergent RRT x4 at OSH, atrial flutter requiring beta-blockade, and acute hypoxemic respiratory failure requiring HFNC (requiring 10L HFNC). He was transferred to [facility] MICU on 5/17/2025 for higher level of care. 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 He has signs/symptoms of CSPH such as slightly elevated INR, thrombocytopenia and PHG seen on recent 5/12 EGD, but he does not appear overtly decompensated at this time. The hepatic masses could be due to HCC, though without a known diagnosis of cirrhosis, this would require a biopsy to confirm. His deteriorating renal function is likely due to ATN from hypotension from a combination of sepsis, cardiac arrhthymias, and fluid shifting from dialysis. This is supported by significant lactic acidosis and moderate hepatocellular liver injury (improving) with hyperbilirubinemia due to ischemic hepatitis (ALT:LDH < 1.5). Low concern for HRS at this time (5/18 urine sodium ws 25). The patient's altered mentation is more likely related to hyperactive delirium rather than hepatic encephalopathy at this time.    #Hepatic Masses  #Moderate, Hepatocellular Liver Injury - improving  #Hyperbilirubinemia - stable      Recommendations:  -Ongoing management of shock and hypoxemic respiratory failure per ICU team  -Obtain US liver doppler  -Noted AFP this admission is <4  -Low suspicion for HRS at this time, and even if so,  patient is not a terlipressin candidate at this time due to AHRF as well as ACLF (3).  -Please check acute hepatitis panel plus HCV PCR, HBV core total Ab, HBV core IgM and HAV IgG  -Liver stamp 5/17: SHIV, AMA, ASMA, ceruloplasmin all unrevealing  -Continue to trend MELD labs daily: CMP, INR     Patient was seen and discussed with Dr. Newton    Thank you for the consultation. Hepatology will continue to the follow.  - During weekday hours of 7am- 5pm, please do not hesitate to contact me on [employer] Smart[employer] RFID Chat or page [phone] if there are any further questions   - After hours, on weekends, and on holidays, please [place] [phone]. Thank you.     Olena Michele MD  PGY5 Gastroenterology Fellow  [facility]         [1]   Current Facility-Administered Medications:     albuterol 2.5 mg /3 mL (0.083 %) nebulizer solution 2.5 mg, 2.5 mg, nebulization, q4h PRN, Pawel Salas MD    digoxin (Lanoxin) injection 125 mcg, 125 mcg, intravenous, q6h, Pawel Salas MD, 125 mcg at 05/20/25 0816    heparin (porcine) injection 5,000 Units, 5,000 Units, subcutaneous, q8h, Mich Rey MD, 5,000 Units at 05/20/25 1252    hydrocortisone sodium succinate (PF) (Solu-CORTEF) injection 50 mg, 50 mg, intravenous, q6h, Nazia WILFRIDO Heart MD, 50 mg at 05/20/25 1040    insulin lispro injection 0-5 Units, 0-5 Units, subcutaneous, q4h, Pawel Salas MD, 2 Units at 05/20/25 1251    lactulose 20 gram/30 mL oral solution 20 g, 20 g, oral, q8h, Pawel Salas MD, 20 g at 05/19/25 1444    meropenem (Merrem) 500 mg in sodium chloride 0.9%  mL, 500 mg, intravenous, Every Day, Pawel Salas MD, Stopped at 05/20/25 0025    micafungin (Mycamine) 100 mg in dextrose 5%  mL, 100 mg, intravenous, q24h,  Nazia Heart MD, Stopped at 05/20/25 1207    norepinephrine (Levophed) 8 mg in dextrose 5% 250 mL (0.032 mg/mL) infusion (premix), 0-1 mcg/kg/min, intravenous, Continuous, Pawel Salas MD, Last Rate: 30.1 mL/hr at 05/20/25 1200, 0.22 mcg/kg/min at 05/20/25 1200    oxygen (O2) therapy, , inhalation, Continuous PRN - O2/gases, Vamshi Cleaning MD, 60 L/min at 05/20/25 1200    tamsulosin (Flomax) 24 hr capsule 0.4 mg, 0.4 mg, oral, Daily, Pawel Salas MD, 0.4 mg at 05/20/25 0816    vancomycin (Vancocin) pharmacy to dose - pharmacy monitoring, , miscellaneous, Daily PRN, Pawel Salas MD    vasopressin (Vasostrict) 0.2 unit/mL in 5% dextrose 100 mL IV, 0.03 Units/min, intravenous, Continuous, Nazia Heart MD, Last Rate: 9 mL/hr at 05/20/25 1200, 0.03 Units/min at 05/20/25 1200

## 2025-05-20 NOTE — PROGRESS NOTES
Communication Note    Patient Name: Ramana Looney  MRN: [id number]  Today's Date: 5/20/2025   Room: 28/28-A    Discipline: Physical Therapy      PT Missed Visit: Yes  Missed Visit Reason:  (PT evaluation reviewed, patient pending further GOC discussion.  Will monitor and follow up as appropriate.)      05/20/25 at 2:49 PM   Adonis Treadwell, PT   Rehab Office: [phone]

## 2025-05-21 VITALS
BODY MASS INDEX: 23.84 KG/M2 | OXYGEN SATURATION: 42 % | DIASTOLIC BLOOD PRESSURE: 54 MMHG | SYSTOLIC BLOOD PRESSURE: 128 MMHG | TEMPERATURE: 97.9 F | HEIGHT: 69 IN | WEIGHT: 160.94 LBS

## 2025-05-21 NOTE — SIGNIFICANT EVENT
I was called to patient’s bedside to pronounce that patient has . No spontaneous movements were present. There was not response to verbal or tactile stimuli. Pupils were mid-dilated and fixed. No breath sounds were appreciated over either lung field. No carotid pulses were palpable. No heart sounds were auscultator over entire precordium. Patient pronounced dead at  of 2025  Family were at bedside at the time of death.  The family declines autopsy. Miriam and postmortem services offered.. Time of death was 11:19 PM confirmed and witnessed by Hermann Villalobos RN.

## 2025-05-21 NOTE — DISCHARGE SUMMARY
Discharge Diagnosis  Shock (Multi)    Issues Requiring Follow-Up  none    Test Results Pending At Discharge  Pending Labs       Order Current Status    Blood Culture Preliminary result    Blood Culture Preliminary result            Hospital Course  Ramana Looney is a 84 y.o. male with known diabetes, hyperlipidemia and hypertension who is a transfer from [facility] who initially presented with abdominal pain and diarrhea. His course is complicated by MARY with hyperkalemia needing emergency renal replacement therapy (did 4 times per intake note) and there was also noted to have hepatomegaly with multiple liver nodules with varying size concerning for liver cirrhosis vs hepatocellular CA needing biopsy (biopsy deferred 2/2 anemia). He was transferred to the ICU at OSH due to worsening renal and liver function and acidosis. He had episodes of hypotension requiring pressors 2/2 undifferentiated shock. Per OSH notes, there is a concern for line infection that is why his antibiotics was broaden to Meropenem (initially on CTX for UTI). Sepsis work up was started as well at OSH, line removed and placed at different location. Patient was then transferred to our institution for higher level of care for his undifferentiated shock.     Upon admission to [facility] MICU, pt found to have worsening oliguric MARY nephrology engaged. Lactate >4, although not on pressors and mentating well. Found to have tropinemia peaking to 1.67k, runs of atypical atrial flutter, although asymptomatic with no chest pain/SOB, cardiology consulted. Hepatology also consulted for further workup of liver pathologies and c/f cirrhosis +/- HCC. Hgb found to be 6.4, 1u RBC given, no obvious source of bleeding; EGD 5/12 at OSH showed no bleeding, no varices. In afternoon of 5/18, started on low-dose pressors for hypotension. Broadened abx to vanc-chandrakant, infx workup sent.     Acute hepatitis panel and liver stamp labs sent but unrevealing.  Hepatology reviewed case and recommended continue supportive care and check liver US. Patient continued to have runs of atrial flutter with rates up to the 140's and was ultimately started on digoxin for rhythm control. On morning of 05/20 patient was initially Aox2, resting comfortable in bed, on levophed 0.12 and 12L NC. However, over the course of the morning patient with increased O2 and pressor requirements without any change in his subjective symptoms. Ultimately up to 0.2 levophed, added vasopressin, up to HFNC 60L 60% FiO2. Added fungal coverage, started stress dose steroids, and gave bolus IVF for volume resuscitation. Planned for RRT but given deterioration in clinical status discussion had with patient, wife, and son at bedside. Decided against RRT and after further discussion both the patient and family decided they wished to purse comfort care measures rather than further medical intervention...    Patient time of death was 11:19 PM of 05/20/2025.    Pertinent Physical Exam At Time of Discharge  Physical Exam  No spontaneous movements were present. There was not response to verbal or tactile stimuli. Pupils were mid-dilated and fixed. No breath sounds were appreciated over either lung field. No carotid pulses were palpable. No heart sounds were auscultator over entire precordium.     Home Medications     Medication List      ASK your doctor about these medications     amLODIPine 5 mg tablet; Commonly known as: Norvasc   ascorbic acid 500 mg tablet; Commonly known as: Vitamin C   aspirin 81 mg EC tablet   atenolol 25 mg tablet; Commonly known as: Tenormin   CENTRUM SILVER ORAL   fluticasone 50 mcg/actuation nasal spray; Commonly known as: Flonase   gemfibrozil 600 mg tablet; Commonly known as: Lopid   glyBURIDE 5 mg tablet; Commonly known as: Diabeta   magnesium gluconate 27.5 mg magne- sium (500 mg) tablet; Commonly known   as: Magonate   metFORMIN 1,000 mg tablet; Commonly known as: Glucophage    PROBIOTIC ORAL   simvastatin 40 mg tablet; Commonly known as: Zocor   tamsulosin 0.4 mg 24 hr capsule; Commonly known as: Flomax   valsartan 80 mg tablet; Commonly known as: Diovan       Outpatient Follow-Up  Future Appointments   Date Time Provider Department Center   9/26/2025 10:45 AM Fernando Marques MD [id number] [facility]       Pawel Salas MD

## 2025-05-22 LAB
BACTERIA BLD CULT: NORMAL
BACTERIA BLD CULT: NORMAL

## 2025-05-28 LAB
ATRIAL RATE: 153 BPM
ATRIAL RATE: 84 BPM
P AXIS: 58 DEGREES
P OFFSET: 172 MS
P ONSET: 130 MS
PR INTERVAL: 194 MS
Q ONSET: 226 MS
Q ONSET: 227 MS
QRS COUNT: 14 BEATS
QRS COUNT: 23 BEATS
QRS DURATION: 138 MS
QRS DURATION: 82 MS
QT INTERVAL: 358 MS
QT INTERVAL: 384 MS
QTC CALCULATION(BAZETT): 453 MS
QTC CALCULATION(BAZETT): 540 MS
QTC FREDERICIA: 429 MS
QTC FREDERICIA: 471 MS
R AXIS: -5 DEGREES
R AXIS: 11 DEGREES
T AXIS: -12 DEGREES
T AXIS: -42 DEGREES
T OFFSET: 405 MS
T OFFSET: 419 MS
VENTRICULAR RATE: 137 BPM
VENTRICULAR RATE: 84 BPM

## 2025-06-05 PROBLEM — E86.0 DEHYDRATION: Status: RESOLVED | Noted: 2025-05-06 | Resolved: 2025-06-05

## 2025-09-26 ENCOUNTER — APPOINTMENT (OUTPATIENT)
Dept: CARDIOLOGY | Facility: HOSPITAL | Age: 85
End: 2025-09-26
Payer: MEDICARE